# Patient Record
Sex: MALE | Race: WHITE | NOT HISPANIC OR LATINO | Employment: OTHER | URBAN - METROPOLITAN AREA
[De-identification: names, ages, dates, MRNs, and addresses within clinical notes are randomized per-mention and may not be internally consistent; named-entity substitution may affect disease eponyms.]

---

## 2017-01-06 ENCOUNTER — GENERIC CONVERSION - ENCOUNTER (OUTPATIENT)
Dept: OTHER | Facility: OTHER | Age: 72
End: 2017-01-06

## 2017-01-06 ENCOUNTER — APPOINTMENT (OUTPATIENT)
Dept: LAB | Facility: CLINIC | Age: 72
End: 2017-01-06
Payer: MEDICARE

## 2017-01-06 DIAGNOSIS — E11.9 TYPE 2 DIABETES MELLITUS WITHOUT COMPLICATIONS (HCC): ICD-10-CM

## 2017-01-06 DIAGNOSIS — E53.8 DEFICIENCY OF OTHER SPECIFIED B GROUP VITAMINS: ICD-10-CM

## 2017-01-06 DIAGNOSIS — I10 ESSENTIAL (PRIMARY) HYPERTENSION: ICD-10-CM

## 2017-01-06 LAB
ALBUMIN SERPL BCP-MCNC: 3.8 G/DL (ref 3.5–5)
ALP SERPL-CCNC: 78 U/L (ref 46–116)
ALT SERPL W P-5'-P-CCNC: 22 U/L (ref 12–78)
ANION GAP SERPL CALCULATED.3IONS-SCNC: 6 MMOL/L (ref 4–13)
AST SERPL W P-5'-P-CCNC: 13 U/L (ref 5–45)
BILIRUB SERPL-MCNC: 0.49 MG/DL (ref 0.2–1)
BUN SERPL-MCNC: 15 MG/DL (ref 5–25)
CALCIUM SERPL-MCNC: 9.2 MG/DL (ref 8.3–10.1)
CHLORIDE SERPL-SCNC: 100 MMOL/L (ref 100–108)
CHOLEST SERPL-MCNC: 152 MG/DL (ref 50–200)
CO2 SERPL-SCNC: 29 MMOL/L (ref 21–32)
CREAT SERPL-MCNC: 0.81 MG/DL (ref 0.6–1.3)
EST. AVERAGE GLUCOSE BLD GHB EST-MCNC: 266 MG/DL
GFR SERPL CREATININE-BSD FRML MDRD: >60 ML/MIN/1.73SQ M
GLUCOSE SERPL-MCNC: 305 MG/DL (ref 65–140)
HBA1C MFR BLD: 10.9 % (ref 4.2–6.3)
HDLC SERPL-MCNC: 47 MG/DL (ref 40–60)
LDLC SERPL CALC-MCNC: 88 MG/DL (ref 0–100)
POTASSIUM SERPL-SCNC: 4.3 MMOL/L (ref 3.5–5.3)
PROT SERPL-MCNC: 8.1 G/DL (ref 6.4–8.2)
SODIUM SERPL-SCNC: 135 MMOL/L (ref 136–145)
TRIGL SERPL-MCNC: 83 MG/DL
VIT B12 SERPL-MCNC: 289 PG/ML (ref 100–900)

## 2017-01-06 PROCEDURE — 80061 LIPID PANEL: CPT

## 2017-01-06 PROCEDURE — 83036 HEMOGLOBIN GLYCOSYLATED A1C: CPT

## 2017-01-06 PROCEDURE — 36415 COLL VENOUS BLD VENIPUNCTURE: CPT

## 2017-01-06 PROCEDURE — 80053 COMPREHEN METABOLIC PANEL: CPT

## 2017-01-06 PROCEDURE — 82607 VITAMIN B-12: CPT

## 2017-01-11 ENCOUNTER — ALLSCRIPTS OFFICE VISIT (OUTPATIENT)
Dept: OTHER | Facility: OTHER | Age: 72
End: 2017-01-11

## 2017-01-12 ENCOUNTER — GENERIC CONVERSION - ENCOUNTER (OUTPATIENT)
Dept: OTHER | Facility: OTHER | Age: 72
End: 2017-01-12

## 2017-04-12 DIAGNOSIS — E11.9 TYPE 2 DIABETES MELLITUS WITHOUT COMPLICATIONS (HCC): ICD-10-CM

## 2017-05-12 ENCOUNTER — APPOINTMENT (OUTPATIENT)
Dept: LAB | Facility: CLINIC | Age: 72
End: 2017-05-12
Payer: MEDICARE

## 2017-05-12 ENCOUNTER — TRANSCRIBE ORDERS (OUTPATIENT)
Dept: LAB | Facility: CLINIC | Age: 72
End: 2017-05-12

## 2017-05-12 DIAGNOSIS — E11.9 TYPE 2 DIABETES MELLITUS WITHOUT COMPLICATIONS (HCC): ICD-10-CM

## 2017-05-12 LAB
ALBUMIN SERPL BCP-MCNC: 3.7 G/DL (ref 3.5–5)
ALP SERPL-CCNC: 80 U/L (ref 46–116)
ALT SERPL W P-5'-P-CCNC: 22 U/L (ref 12–78)
ANION GAP SERPL CALCULATED.3IONS-SCNC: 7 MMOL/L (ref 4–13)
AST SERPL W P-5'-P-CCNC: 14 U/L (ref 5–45)
BILIRUB SERPL-MCNC: 0.37 MG/DL (ref 0.2–1)
BUN SERPL-MCNC: 14 MG/DL (ref 5–25)
CALCIUM SERPL-MCNC: 9.1 MG/DL (ref 8.3–10.1)
CHLORIDE SERPL-SCNC: 98 MMOL/L (ref 100–108)
CO2 SERPL-SCNC: 31 MMOL/L (ref 21–32)
CREAT SERPL-MCNC: 0.76 MG/DL (ref 0.6–1.3)
CREAT UR-MCNC: 39.2 MG/DL
EST. AVERAGE GLUCOSE BLD GHB EST-MCNC: 226 MG/DL
GFR SERPL CREATININE-BSD FRML MDRD: >60 ML/MIN/1.73SQ M
GLUCOSE P FAST SERPL-MCNC: 264 MG/DL (ref 65–99)
HBA1C MFR BLD: 9.5 % (ref 4.2–6.3)
MICROALBUMIN UR-MCNC: 32.4 MG/L (ref 0–20)
MICROALBUMIN/CREAT 24H UR: 83 MG/G CREATININE (ref 0–30)
POTASSIUM SERPL-SCNC: 3.9 MMOL/L (ref 3.5–5.3)
PROT SERPL-MCNC: 7.8 G/DL (ref 6.4–8.2)
SODIUM SERPL-SCNC: 136 MMOL/L (ref 136–145)

## 2017-05-12 PROCEDURE — 36415 COLL VENOUS BLD VENIPUNCTURE: CPT

## 2017-05-12 PROCEDURE — 80053 COMPREHEN METABOLIC PANEL: CPT

## 2017-05-12 PROCEDURE — 82043 UR ALBUMIN QUANTITATIVE: CPT

## 2017-05-12 PROCEDURE — 83036 HEMOGLOBIN GLYCOSYLATED A1C: CPT

## 2017-05-12 PROCEDURE — 82570 ASSAY OF URINE CREATININE: CPT

## 2017-05-15 ENCOUNTER — GENERIC CONVERSION - ENCOUNTER (OUTPATIENT)
Dept: OTHER | Facility: OTHER | Age: 72
End: 2017-05-15

## 2017-05-17 ENCOUNTER — ALLSCRIPTS OFFICE VISIT (OUTPATIENT)
Dept: OTHER | Facility: OTHER | Age: 72
End: 2017-05-17

## 2017-07-13 ENCOUNTER — ALLSCRIPTS OFFICE VISIT (OUTPATIENT)
Dept: OTHER | Facility: OTHER | Age: 72
End: 2017-07-13

## 2017-08-14 DIAGNOSIS — E11.9 TYPE 2 DIABETES MELLITUS WITHOUT COMPLICATIONS (HCC): ICD-10-CM

## 2017-10-12 ENCOUNTER — GENERIC CONVERSION - ENCOUNTER (OUTPATIENT)
Dept: OTHER | Facility: OTHER | Age: 72
End: 2017-10-12

## 2017-10-23 ENCOUNTER — GENERIC CONVERSION - ENCOUNTER (OUTPATIENT)
Dept: OTHER | Facility: OTHER | Age: 72
End: 2017-10-23

## 2017-11-02 ENCOUNTER — TRANSCRIBE ORDERS (OUTPATIENT)
Dept: LAB | Facility: CLINIC | Age: 72
End: 2017-11-02

## 2017-11-02 ENCOUNTER — GENERIC CONVERSION - ENCOUNTER (OUTPATIENT)
Dept: OTHER | Facility: OTHER | Age: 72
End: 2017-11-02

## 2017-11-02 ENCOUNTER — APPOINTMENT (OUTPATIENT)
Dept: LAB | Facility: CLINIC | Age: 72
End: 2017-11-02
Payer: MEDICARE

## 2017-11-02 DIAGNOSIS — E11.9 TYPE 2 DIABETES MELLITUS WITHOUT COMPLICATIONS (HCC): ICD-10-CM

## 2017-11-02 LAB
ALBUMIN SERPL BCP-MCNC: 3.6 G/DL (ref 3.5–5)
ALP SERPL-CCNC: 64 U/L (ref 46–116)
ALT SERPL W P-5'-P-CCNC: 19 U/L (ref 12–78)
ANION GAP SERPL CALCULATED.3IONS-SCNC: 8 MMOL/L (ref 4–13)
AST SERPL W P-5'-P-CCNC: 12 U/L (ref 5–45)
BILIRUB SERPL-MCNC: 0.32 MG/DL (ref 0.2–1)
BUN SERPL-MCNC: 16 MG/DL (ref 5–25)
CALCIUM SERPL-MCNC: 9.1 MG/DL (ref 8.3–10.1)
CHLORIDE SERPL-SCNC: 102 MMOL/L (ref 100–108)
CO2 SERPL-SCNC: 26 MMOL/L (ref 21–32)
CREAT SERPL-MCNC: 0.79 MG/DL (ref 0.6–1.3)
EST. AVERAGE GLUCOSE BLD GHB EST-MCNC: 255 MG/DL
GFR SERPL CREATININE-BSD FRML MDRD: 92 ML/MIN/1.73SQ M
GLUCOSE P FAST SERPL-MCNC: 282 MG/DL (ref 65–99)
HBA1C MFR BLD: 10.5 % (ref 4.2–6.3)
POTASSIUM SERPL-SCNC: 4 MMOL/L (ref 3.5–5.3)
PROT SERPL-MCNC: 7.4 G/DL (ref 6.4–8.2)
SODIUM SERPL-SCNC: 136 MMOL/L (ref 136–145)

## 2017-11-02 PROCEDURE — 80053 COMPREHEN METABOLIC PANEL: CPT

## 2017-11-02 PROCEDURE — 83036 HEMOGLOBIN GLYCOSYLATED A1C: CPT

## 2017-11-02 PROCEDURE — 36415 COLL VENOUS BLD VENIPUNCTURE: CPT

## 2017-11-06 ENCOUNTER — ALLSCRIPTS OFFICE VISIT (OUTPATIENT)
Dept: OTHER | Facility: OTHER | Age: 72
End: 2017-11-06

## 2017-11-06 DIAGNOSIS — R00.0 TACHYCARDIA: ICD-10-CM

## 2017-11-06 DIAGNOSIS — R00.2 PALPITATIONS: ICD-10-CM

## 2017-11-07 ENCOUNTER — APPOINTMENT (OUTPATIENT)
Dept: LAB | Facility: CLINIC | Age: 72
End: 2017-11-07
Payer: MEDICARE

## 2017-11-07 ENCOUNTER — GENERIC CONVERSION - ENCOUNTER (OUTPATIENT)
Dept: OTHER | Facility: OTHER | Age: 72
End: 2017-11-07

## 2017-11-07 DIAGNOSIS — R00.0 TACHYCARDIA: ICD-10-CM

## 2017-11-07 LAB
BASOPHILS # BLD AUTO: 0.03 THOUSANDS/ΜL (ref 0–0.1)
BASOPHILS NFR BLD AUTO: 0 % (ref 0–1)
EOSINOPHIL # BLD AUTO: 0.28 THOUSAND/ΜL (ref 0–0.61)
EOSINOPHIL NFR BLD AUTO: 3 % (ref 0–6)
ERYTHROCYTE [DISTWIDTH] IN BLOOD BY AUTOMATED COUNT: 14.5 % (ref 11.6–15.1)
HCT VFR BLD AUTO: 44.8 % (ref 36.5–49.3)
HGB BLD-MCNC: 14.9 G/DL (ref 12–17)
LYMPHOCYTES # BLD AUTO: 2.62 THOUSANDS/ΜL (ref 0.6–4.47)
LYMPHOCYTES NFR BLD AUTO: 26 % (ref 14–44)
MCH RBC QN AUTO: 30.1 PG (ref 26.8–34.3)
MCHC RBC AUTO-ENTMCNC: 33.3 G/DL (ref 31.4–37.4)
MCV RBC AUTO: 91 FL (ref 82–98)
MONOCYTES # BLD AUTO: 0.79 THOUSAND/ΜL (ref 0.17–1.22)
MONOCYTES NFR BLD AUTO: 8 % (ref 4–12)
NEUTROPHILS # BLD AUTO: 6.48 THOUSANDS/ΜL (ref 1.85–7.62)
NEUTS SEG NFR BLD AUTO: 63 % (ref 43–75)
NRBC BLD AUTO-RTO: 0 /100 WBCS
PLATELET # BLD AUTO: 271 THOUSANDS/UL (ref 149–390)
PMV BLD AUTO: 10.7 FL (ref 8.9–12.7)
RBC # BLD AUTO: 4.95 MILLION/UL (ref 3.88–5.62)
TSH SERPL DL<=0.05 MIU/L-ACNC: 0.49 UIU/ML (ref 0.36–3.74)
WBC # BLD AUTO: 10.26 THOUSAND/UL (ref 4.31–10.16)

## 2017-11-07 PROCEDURE — 84443 ASSAY THYROID STIM HORMONE: CPT

## 2017-11-07 PROCEDURE — 36415 COLL VENOUS BLD VENIPUNCTURE: CPT

## 2017-11-07 PROCEDURE — 85025 COMPLETE CBC W/AUTO DIFF WBC: CPT

## 2017-11-07 NOTE — PROGRESS NOTES
Assessment  1  Tachycardia (785 0) (R00 0)   2  Diabetes mellitus, type 2 (250 00) (E11 9)   3  Hypertension (401 9) (I10)   4  Depression (311) (F32 9)   5  Diabetic retinopathy (250 50,362 01) (E11 319)   ·   [address] [facility] [address] [place]  Diabetes mellitus, type 2, Diabetic retinopathy    · (1) HEMOGLOBIN A1C; Status:Active; Requested for:61Nkx4893; Flu vaccine need    · Stop: Fluzone High-Dose 0 5 ML Intramuscular Suspension Prefilled  Syringe  Hypertension    · (1) COMPREHENSIVE METABOLIC PANEL; Status:Active; Requested for:46Now7585; Tachycardia    · Metoprolol Succinate ER 50 MG Oral Tablet Extended Release 24 Hour; TAKE 1  TABLET DAILY   · (1) CBC/PLT/DIFF; Status:Active; Requested WYJ:09JGO5986;    · (1) TSH WITH FT4 REFLEX; Status:Active; Requested BEZ:57BLF8171;    · EKG/ECG- POC; Status:Complete - Retrospective By Protocol Authorization;   Done:  76DFF6170 02:05PM   · 1 - Dipti Posada MD, Angie Lemus  (Cardiology) Co-Management  *  Status: Active  Requested for:  63DJT6185  Care Summary provided  : Yes    Discussion/Summary    Patient presents today for follow-up for his chronic health issues  He was noted to be tachycardic with rates ranging from 120 up to about 150  He is asymptomatic  EKG did reveal tachycardia with no signs of atrial fibrillation or a flutter  I spoke to Cardiology and asked them to look at the EKG and they agreed  I am going to check a CBC as well as a TSH  He has no obvious reason for this tachycardia and I am going to expedite an evaluation with Cardiology in addition to adding metoprolol 50 mg daily to help with rate control currently  regards to his diabetes, he continues to refuse insulin  I spoke to him very frankly about the concerns I have regarding him developing ongoing complications with this persistently elevated A1c  He already has some diabetic retinopathy which has been stable, but can certainly progress in light of the high A1c   He is planning on being compliant with the Victoza as well as metformin changing his diet  He does agree to get blood work done within 3 months time and I will see him after that  Contact me immediately if he is having any shortness of breath, lightheadedness or chest discomfort  If he starts to have chest pain he should present to the emergency room  Chief Complaint  8M Follow up - HTN, DM, DepressionFlu shot      Advance Directives  Advance Directive [place]:   YES - Patient has an advance health care directive  The patient has a living will located  in patient's home--   Pt Instructed to bring in copy of living will at next appt  History of Present Illness  Patient presents today for follow-up for his chronic health issues  He was noted incidentally to have a heart rate in the 130s  He has noted that his heart was racing over the past 2 days  He has had no chest pain or shortness of breath  He notes he has maintained his normal activity level without any limitations or lightheadedness  He currently is not having any chest pain or palpitations  He has had no changes to his medical regimen  diabetic control is poor currently  He continues to refuse insulin  He notes he has been relatively noncompliant with Victoza but has been taking his metformin  He has noted some increased voiding but not increased thirst has been stable  Anxiety is under control  He continues on paroxetine  has improved with the Primidone from Dr Nathalie Mckay  Review of Systems    Constitutional: no fever,-- not feeling poorly,-- no recent weight gain,-- no chills,-- not feeling tired-- and-- no recent weight loss  ENT: no sore throat-- and-- no hoarseness  Cardiovascular: the heart rate was not slow-- and-- no chest pain  Respiratory: no shortness of breath,-- no cough,-- no wheezing-- and-- no shortness of breath during exertion  Gastrointestinal: no abdominal pain,-- no nausea,-- no constipation-- and-- no diarrhea  Genitourinary: no urinary hesitancy-- and-- no nocturia  Musculoskeletal: no arthralgias-- and-- no myalgias  Integumentary: itching,-- dry skin,-- skin wound-- and-- psoriasis, but-- no rashes  Neurological: no headache-- and-- no limb weakness  Psychiatric: no anxiety-- and-- no depression  Endocrine: no muscle weakness  Hematologic/Lymphatic: no tendency for easy bleeding  Active Problems  1  Benign familial tremor (333 1) (G25 0)   2  Depression (311) (F32 9)   3  Diabetes mellitus, type 2 (250 00) (E11 9)   4  Diabetic retinopathy (250 50,362 01) (E11 319)   5  Eczema (692 9) (L30 9)   6  Encounter for screening colonoscopy (V76 51) (Z12 11)   7  Exercise counseling (V65 41) (Z71 82)   8  Hypertension (401 9) (I10)   9  Male erectile dysfunction (607 84) (N52 9)   10  Medicare annual wellness visit, subsequent (V70 0) (Z00 00)   11  Need for vaccination with 13-polyvalent pneumococcal conjugate vaccine (V03 82) (Z23)   12  Psoriasis (696 1) (L40 9)   13  Sleep apnea (780 57) (G47 30)   14  Tinea cruris (110 3) (B35 6)   15  Vitamin B12 deficiency (266 2) (E53 8)   16  Vitamin D deficiency (268 9) (E55 9)    Past Medical History  1  History of Cellulitis (682 9) (L03 90)   2  History of hypercholesterolemia (V12 29) (Z86 39)   3  History of intermittent claudication (V12 50) (Z86 79)   4  History of Memory loss (780 93) (R41 3)   5  History of Mild cognitive impairment (331 83) (G31 84)   6  History of Vitamin D deficiency (268 9) (E55 9)    The active problems and past medical history were reviewed and updated today  Family History  Father    1  Family history of Father  At Age 79   2  Family history of Malignant Melanoma Of The Skin (V16 8)    The family history was reviewed and updated today  Social History   · Being A Social Drinker   · Former smoker (C32 24) (A19 106)    Current Meds   1  AmLODIPine Besylate 5 MG Oral Tablet; TAKE 1 TABLET DAILY;    Therapy: 71SCA8589 to (Evaluate:2017)  Requested for: 2016; Last Rx:19Apr2016 Ordered   2  Benazepril HCl - 20 MG Oral Tablet; TAKE 1 TABLET DAILY AS DIRECTED; Therapy: 97DXG1823 to (Evaluate:14Apr2017)  Requested for: 19Apr2016; Last   Rx:19Apr2016 Ordered   3  Calcipotriene 0 005 % External Ointment; APPLY AND GENTLY MASSAGE INTO   AFFECTED AREA(S) TWICE DAILY; Therapy: 24LXE3987 to Recorded   4  Clobetasol Propionate 0 05 % External Cream; APPLY SPARINGLY TO AFFECTED   AREA(S) TWICE DAILY; Therapy: 91ROD7401 to Recorded   5  Fish Oil 1200 MG Oral Capsule; take 1 capsule daily; Therapy: (Recorded:15Apr2016) to Recorded   6  MetFORMIN HCl - 1000 MG Oral Tablet; TAKE 1 TABLET TWICE DAILY; Therapy: 51SOA6242 to (Evaluate:12May2018)  Requested for: 69YMJ2033; Last   Rx:48Cwb2423 Ordered   7  Mometasone Furoate 0 1 % External Cream; APPLY SPARINGLY TO AFFECTED AREAS   TWICE DAILY  (AM AND PM); Therapy: 44WFG3699 to Recorded   8  Multi-Day Vitamins TABS; TAKE 1 TABLET DAILY; Therapy: (Recorded:15Apr2016) to Recorded   9  NovoFine 32G X 6 MM Miscellaneous; USE AS DIRECTED; Therapy: 38WZX2794 to (Evaluate:12May2018)  Requested for: 42YZN1837; Last   Rx:39Hoi0530 Ordered   10  PARoxetine HCl - 20 MG Oral Tablet; take 1 tablet by mouth once daily; Therapy: 94HTF8202 to (Evaluate:12Jun2016)  Requested for: 35PHG0200; Last    Rx:77Klj7509 Ordered   11  Primidone 50 MG Oral Tablet; Take 3 tabs in the am and 4 tabs in pm x 1 week, if    no improvement can increase to 4 tabs BID; Therapy: 01ADP5737 to (Evaluate:08Apr2017)  Requested for: 92JCS1971; Last    Rx:32Kac2244 Ordered   12  Victoza 18 MG/3ML Subcutaneous Solution Pen-injector; inject 1 8 milligram    subcutaneously daily; Therapy: 79QBC8667 to (DIQMGWRP:30GEY6937)  Requested for: 63MLY8493; Last    HW:29NUA2978 Ordered   13  Vitamin B12 TABS; TAKE 1 TABLET DAILY AS DIRECTED; Therapy: (Recorded:67Wuo3118) to Recorded   14  Vitamin D (Ergocalciferol) 85981 UNIT Oral Capsule;     Therapy: 30YIG6375 to (Last Rx:76Khd3525)  Requested for: 40MTU6416 Ordered    Allergies  1  Actos TABS   2  Sulfa Drugs    Vitals  Vital Signs    Recorded: 11EOL2159 01:56PM   Heart Rate 136   Respiration 16   Systolic 716   Diastolic 76   Height 5 ft 8 in   Weight 227 lb    BMI Calculated 34 52   BSA Calculated 2 16   O2 Saturation 96, RA     Physical Exam    Constitutional   General appearance: Abnormal  -- obese  Eyes   Conjunctiva and lids: No swelling, erythema, or discharge  Ears, Nose, Mouth, and Throat   Nasal mucosa, septum, and turbinates: Normal without edema or erythema  Oropharynx: Normal with no erythema, edema, exudate or lesions  Pulmonary   Respiratory effort: No increased work of breathing or signs of respiratory distress  Auscultation of lungs: Clear to auscultation, equal breath sounds bilaterally, no wheezes, no rales, no rhonci  Cardiovascular   Auscultation of heart: Normal rate and rhythm, normal S1 and S2, without murmurs  Examination of extremities for edema and/or varicosities: Normal     Carotid pulses: Normal     Abdomen   Abdomen: Abnormal  -- obese  Lymphatic   Palpation of lymph nodes in neck: No lymphadenopathy  Musculoskeletal   Gait and station: Normal     Skin   Examination of the skin for lesions: Abnormal     Neurologic   Cranial nerves: Cranial nerves 2-12 intact  Psychiatric   Orientation to person, place and time: Normal     Mood and affect: Normal          Results/Data  EKG/ECG- POC 42DWR7295 02:05PM Aleksey Hawley     Test Name Result Flag Reference   EKG/ECG 11/6/2017       PHQ-9 Adult Depression Screening 24EZX6913 02:02PM Ita Oliver     Test Name Result Flag Reference   PHQ-9 Adult Depression Score 0     Over the last two weeks, how often have you been bothered by any of the following problems?   Little interest or pleasure in doing things: Not at all - 0  Feeling down, depressed, or hopeless: Not at all - 0  Trouble falling or staying asleep, or sleeping too much: Not at all - 0  Feeling tired or having little energy: Not at all - 0  Poor appetite or over eating: Not at all - 0  Feeling bad about yourself - or that you are a failure or have let yourself or your family down: Not at all - 0  Trouble concentrating on things, such as reading the newspaper or watching television: Not at all - 0  Moving or speaking so slowly that other people could have noticed  Or the opposite -  being so fidgety or restless that you have been moving around a lot more than usual: Not at all - 0  Thoughts that you would be better off dead, or of hurting yourself in some way: Not at all - 0   PHQ-9 Adult Depression Screening Negative     PHQ-9 Difficulty Level Not difficult at all     PHQ-9 Severity No Depression         Health Management  Diabetes mellitus, type 2   *VB - Eye Exam; every 1 year; Last 93IFF6953; Next Due: 03HJK2567; Active  *VB - Foot Exam; every 1 year; Last 43OKS5643; Next Due: 14WBH0893;  Active    Future Appointments    Date/Time Provider Specialty Site   07/19/2018 12:30 PM Nehemiah Wade MD Neurology [facility]     Signatures   Electronically signed by : TAISHA Lama ; Nov 6 2017  2:39PM EST                       (Author)

## 2017-11-08 ENCOUNTER — ALLSCRIPTS OFFICE VISIT (OUTPATIENT)
Dept: OTHER | Facility: OTHER | Age: 72
End: 2017-11-08

## 2017-11-10 NOTE — CONSULTS
Assessment  1  Tachycardia (785 0) (R00 0)   2  Palpitations (785 1) (R00 2)   3  Hypertension (401 9) (I10)    Plan  Hypertension    · EKG/ECG- POC; Status:Complete;   Done: 28WZU8291 02:55PM   Perform: In Office; Last Updated Jacobo Quevedo; 11/8/2017 2:55:37 PM;Ordered;Ordered By:Sarkis Page;  Palpitations, Tachycardia    · ECHO COMPLETE WITH CONTRAST IF INDICATED; Status:Hold For - Scheduling;Requested for:08Nov2017;    Perform:[facility]; LFB:97JCJ6770; Ordered; Tachycardia; Ordered By:Sarkis Page;   · HOLTER MONITOR - 48 HOUR; Status:Hold For - Scheduling; Requestedfor:08Nov2017;    Perform:[facility]; EVK:64GBZ0339; Ordered; Tachycardia; Ordered By:Sarkis Page;    Discussion/Summary    It is my impression that the patient had a narrow complex tachycardia when he saw his primary care physician 2 days ago  His heart rate was about 130 beats per minute  He did have active palpitations at that time  Somewhere in the next 24 hours he appeared to have disappearance of the symptoms  Today he appears to be clearly in sinus rhythm  I reviewed his strips with my electrophysiology associate and it is possible this may represent paroxysmal atrial tachycardia  We will continue modest dose beta-blockers going forward  I will bring him back for a 48 hour Holter monitor in a few weeks time  We will do an echocardiogram to rule out structural heart disease  It is unlikely there is any structural heart disease based on his exam  His TSH and CBC were normal and there appears to be no provocation for sinus tachycardia  He will continue his usual antihypertensive regimen which includes amlodipine, benazepril and now metoprolol  His blood pressure was quite good today on this regimen  I will follow up with him by phone regarding the results of his echocardiogram and Holter monitor  Chief Complaint  Patient here for NP evaluation for palpitations and tachycardia  Sonal Vegas  also has HTN/DM      History of Present Illness  Cardiology HPI Free Text Note Form Marton Halsted: The patient presented to his family physician for a routine visit on 11/06/2017  He had noticed that his heart had been racing for 1 day  He was noticed to be in a narrow complex tachycardia on his EKG at 130 beats per minute  He felt that the palpitations have gone away within 24 hours  He checked his blood pressure and heart rate at a station at his pharmacy and his heart rate was 89 beats per minute the next day  He was put on metoprolol and started taking this today  He denies chest pain, shortness of breath, edema or lightheadedness  Review of Systems     Cardiac: witnessed apnea episodes, but-- No complaints of chest pain, no palpitations, no fainiting ,-- no syncope/fainting-- and-- no AM fatigue  Skin: No complaints of nonhealing sores or skin rash  Genitourinary: No complaints of recurrent urinary tract infections, frequent urination at night, difficult urination, blood in urine, kidney stones, loss of bladder control, no kidney or prostate problems, no erectile dysfunction  Psychological: No complaints of feeling depressed, anxiety, panic attacks, or difficulty concentrating  General: No complaints of trouble sleeping, lack of energy, fatigue, appetite changes, weight changes, fever, frequent infections, or night sweats  Respiratory: No complaints of shortness of breath, cough with sputum, or wheezing  HEENT: No complaints of serious problems, hearing problems, nose problems, throat problems, or snoring  Gastrointestinal: No complaints of liver problems, nausea, vomiting, heartburn, constipation, bloody stools, diarrhea, problems swallowing, adbominal pain, or rectal bleeding  Hematologic: No complaints of bleeding disorders, anemia, blood clots, or excessive brusing    Neurological: no numbness,-- no tingling,-- no weakness,-- no seizures,-- no headaches,-- no dizziness,-- no diplopia-- and-- no daytime sleepiness  Musculoskeletal: No complaints of arthritis, back pain, or painfull swelling  ROS reviewed  Active Problems  1  Benign familial tremor (333 1) (G25 0)   2  Depression (311) (F32 9)   3  Diabetes mellitus, type 2 (250 00) (E11 9)   4  Diabetic retinopathy (250 50,362 01) (E11 319)   5  Eczema (692 9) (L30 9)   6  Encounter for screening colonoscopy (V76 51) (Z12 11)   7  Exercise counseling (V65 41) (Z71 82)   8  Flu vaccine need (V04 81) (Z23)   9  Hypertension (401 9) (I10)   10  Male erectile dysfunction (607 84) (N52 9)   11  Medicare annual wellness visit, subsequent (V70 0) (Z00 00)   12  Need for vaccination with 13-polyvalent pneumococcal conjugate vaccine (V03 82) (Z23)   13  Psoriasis (696 1) (L40 9)   14  Sleep apnea (780 57) (G47 30)   15  Tachycardia (785 0) (R00 0)   16  Tinea cruris (110 3) (B35 6)   17  Vitamin B12 deficiency (266 2) (E53 8)   18  Vitamin D deficiency (268 9) (E55 9)    Past Medical History   · History of Cellulitis (682 9) (L03 90)   · History of hypercholesterolemia (V12 29) (Z86 39)   · History of intermittent claudication (V12 50) (Z86 79)   · History of Memory loss (780 93) (R41 3)   · History of Mild cognitive impairment (331 83) (G31 84)   · History of Vitamin D deficiency (268 9) (E55 9)    The active problems and past medical history were reviewed and updated today  Surgical History   · History of Tonsillectomy With Adenoidectomy    The surgical history was reviewed and updated today  Family History  Father    · Family history of Father  At Age 79   · Family history of Malignant Melanoma Of The Skin (V16 8)  Family History Reviewed: The family history was reviewed and updated today  Social History     · Being A Social Drinker   · RARE   · Former smoker (Q77 97) (B71 102)   · QUIT   The social history was reviewed and updated today  The social history was reviewed and is unchanged  Current Meds   1   AmLODIPine Besylate 5 MG Oral Tablet; TAKE 1 TABLET DAILY; Therapy: 89FYQ2483 to (Evaluate:14Apr2017)  Requested for: 19Apr2016; Last Rx:19Apr2016 Ordered   2  Benazepril HCl - 20 MG Oral Tablet; TAKE 1 TABLET DAILY AS DIRECTED; Therapy: 12LER3373 to (Evaluate:14Apr2017)  Requested for: 19Apr2016; Last Rx:19Apr2016 Ordered   3  Calcipotriene 0 005 % External Ointment; APPLY AND GENTLY MASSAGE INTO AFFECTED AREA(S) TWICE DAILY; Therapy: 42NCP6302 to Recorded   4  Clobetasol Propionate 0 05 % External Cream; APPLY SPARINGLY TO AFFECTED AREA(S) TWICE DAILY; Therapy: 50IJA9154 to Recorded   5  Fish Oil 1200 MG Oral Capsule; take 1 capsule daily; Therapy: (Recorded:15Apr2016) to Recorded   6  MetFORMIN HCl - 1000 MG Oral Tablet; TAKE 1 TABLET TWICE DAILY; Therapy: 62PCO9069 to (Evaluate:12May2018)  Requested for: 16OEN3400; Last Rx:17May2017 Ordered   7  Metoprolol Succinate ER 50 MG Oral Tablet Extended Release 24 Hour; TAKE 1 TABLET DAILY; Therapy: 69URC4229 to (Lee Howell)  Requested for: 31GNH5045; Last Rx:06Nov2017 Ordered   8  Mometasone Furoate 0 1 % External Cream; APPLY SPARINGLY TO AFFECTED AREAS TWICE DAILY  (AM AND PM); Therapy: 58TXR6731 to Recorded   9  Multi-Day Vitamins TABS; TAKE 1 TABLET DAILY; Therapy: (Recorded:15Apr2016) to Recorded   10  NovoFine 32G X 6 MM Miscellaneous; USE AS DIRECTED; Therapy: 13XTZ7300 to (Evaluate:12May2018)  Requested for: 94UQA8964; Last  Rx:66Ijv9014 Ordered   11  PARoxetine HCl - 20 MG Oral Tablet; take 1 tablet by mouth once daily; Therapy: 28CIL0590 to (Evaluate:12Jun2016)  Requested for: 79WFZ0990; Last  Rx:52Peu9711 Ordered   12  Primidone 50 MG Oral Tablet; Take 3 tabs in the am and 4 tabs in pm x 1 week, if  no improvement can increase to 4 tabs BID; Therapy: 68BOL5019 to (Evaluate:08Apr2017)  Requested for: 06JSG6298; Last  Rx:16Mxi1752 Ordered   13  Victoza 18 MG/3ML Subcutaneous Solution Pen-injector; inject 1 8 milligram  subcutaneously daily;   Therapy: 06ZWU7877 to (Evaluate:21Jun2018)  Requested for: 68ZWT2359; Last  WP:42HUS0139 Ordered   14  Vitamin B12 TABS; TAKE 1 TABLET DAILY AS DIRECTED; Therapy: (Recorded:93Vzf8841) to Recorded   15  Vitamin D (Ergocalciferol) 14444 UNIT Oral Capsule; Therapy: 45RQJ7825 to (Last Rx:30Nov2011)  Requested for: 62USJ6587 Ordered    The medication list was reviewed and updated today  Allergies  1  Actos TABS   2  Sulfa Drugs    Vitals  Signs     Heart Rate: 86  Pulse Quality: Normal, L Radial  Respiration Quality: Normal  Respiration: 16  Systolic: 634  Diastolic: 68  Height: 5 ft 8 in  Weight: 229 lb   BMI Calculated: 34 82  BSA Calculated: 2 16    Physical Exam   Constitutional  General appearance: Abnormal  -- obese elderly WM in NAD  Eyes  Conjunctiva and Sclera examination: Conjunctiva pink, sclera anicteric  Ears, Nose, Mouth, and Throat - Oropharynx: Clear, nares are clear, mucous membranes are moist   Neck  Neck and thyroid: Normal, supple, trachea midline, no thyromegaly  Pulmonary  Respiratory effort: No increased work of breathing or signs of respiratory distress  Auscultation of lungs: Clear to auscultation, no rales, no rhonchi, no wheezing, good air movement  Cardiovascular  Auscultation of heart: Normal rate and rhythm, normal S1 and S2, no murmurs  Carotid pulses: Normal, 2+ bilaterally  Pedal pulses: Normal, 2+ bilaterally  Examination of extremities for edema and/or varicosities: Abnormal  -- 1+ LE edema bilaterally  Abdomen  Abdomen: Non-tender and no distention  Liver and spleen: No hepatomegaly or splenomegaly  Future Appointments    Date/Time Provider Specialty Site   07/19/2018 12:30 PM Charito Hager MD Neurology [facility]  [place]   02/23/2018 10:30 TAISHA Dougherty  [address] [facility]   05/16/2018 01:00 PM TAISHA Frost  Family Medicine [place] [address]     End of Encounter Meds    1  Primidone 50 MG Oral Tablet;  Take 3 tabs in the am and 4 tabs in pm x 1 week, if no improvement can increase to 4 tabs BID; Therapy: 71EKY8602 to (Evaluate:08Apr2017)  Requested for: 64DQW1631; Last Rx:09Nlx9130 Ordered    2  PARoxetine HCl - 20 MG Oral Tablet; take 1 tablet by mouth once daily; Therapy: 03SSO1910 to (Evaluate:12Jun2016)  Requested for: 75FBT4127; Last Rx:36Syo1626 Ordered    3  MetFORMIN HCl - 1000 MG Oral Tablet; TAKE 1 TABLET TWICE DAILY; Therapy: 60EBU0189 to (Evaluate:12May2018)  Requested for: 53VNO1114; Last Rx:17May2017 Ordered   4  NovoFine 32G X 6 MM Miscellaneous; USE AS DIRECTED; Therapy: 37HQP4675 to (Evaluate:12May2018)  Requested for: 59JLV0341; Last Rx:17May2017 Ordered   5  Victoza 18 MG/3ML Subcutaneous Solution Pen-injector; inject 1 8 milligram subcutaneously daily; Therapy: 54XXZ4512 to (MIDZDIEJ:88XVG4490)  Requested for: 47OIY3934; Last PD:10KGP6817 Ordered    6  AmLODIPine Besylate 5 MG Oral Tablet; TAKE 1 TABLET DAILY; Therapy: 34WNO9764 to (Evaluate:14Apr2017)  Requested for: 19Apr2016; Last Rx:19Apr2016 Ordered   7  Benazepril HCl - 20 MG Oral Tablet; TAKE 1 TABLET DAILY AS DIRECTED; Therapy: 91GAY3995 to (Evaluate:14Apr2017)  Requested for: 19Apr2016; Last Rx:19Apr2016 Ordered    8  Calcipotriene 0 005 % External Ointment; APPLY AND GENTLY MASSAGE INTO AFFECTED AREA(S) TWICE DAILY; Therapy: 73NQA1888 to Recorded   9  Clobetasol Propionate 0 05 % External Cream; APPLY SPARINGLY TO AFFECTED AREA(S) TWICE DAILY; Therapy: 90BPD7717 to Recorded   10  Mometasone Furoate 0 1 % External Cream; APPLY SPARINGLY TO AFFECTED AREAS  TWICE DAILY  (AM AND PM); Therapy: 50URB2330 to Recorded    11  Metoprolol Succinate ER 50 MG Oral Tablet Extended Release 24 Hour; TAKE 1 TABLET  DAILY; Therapy: 63LJE8803 to (Natalia Hicks)  Requested for: 69FVZ7630; Last  Rx:06Nov2017 Ordered    12  Fish Oil 1200 MG Oral Capsule; take 1 capsule daily; Therapy: (Recorded:15Apr2016) to Recorded   13  Multi-Day Vitamins TABS; TAKE 1 TABLET DAILY;   Therapy: (Recorded:15Apr2016) to Recorded   14  Vitamin B12 TABS; TAKE 1 TABLET DAILY AS DIRECTED; Therapy: (Recorded:15Apr2016) to Recorded   15  Vitamin D (Ergocalciferol) 41352 UNIT Oral Capsule;   Therapy: 38QRH9005 to (Last Rx:30Nov2011)  Requested for: 93MHV3616 Ordered    Signatures   Electronically signed by : TAISHA Red ; Nov 8 2017  3:54PM EST                       (Author)

## 2017-11-30 ENCOUNTER — GENERIC CONVERSION - ENCOUNTER (OUTPATIENT)
Dept: OTHER | Facility: OTHER | Age: 72
End: 2017-11-30

## 2017-11-30 ENCOUNTER — HOSPITAL ENCOUNTER (OUTPATIENT)
Dept: NON INVASIVE DIAGNOSTICS | Facility: CLINIC | Age: 72
Discharge: HOME | End: 2017-11-30
Payer: MEDICARE

## 2017-11-30 DIAGNOSIS — R00.0 TACHYCARDIA: ICD-10-CM

## 2017-11-30 DIAGNOSIS — R00.2 PALPITATIONS: ICD-10-CM

## 2017-11-30 PROCEDURE — 93226 XTRNL ECG REC<48 HR SCAN A/R: CPT

## 2017-11-30 PROCEDURE — 93225 XTRNL ECG REC<48 HRS REC: CPT

## 2017-11-30 PROCEDURE — 93306 TTE W/DOPPLER COMPLETE: CPT

## 2017-12-01 ENCOUNTER — GENERIC CONVERSION - ENCOUNTER (OUTPATIENT)
Dept: OTHER | Facility: OTHER | Age: 72
End: 2017-12-01

## 2017-12-07 DIAGNOSIS — I10 ESSENTIAL (PRIMARY) HYPERTENSION: ICD-10-CM

## 2017-12-07 DIAGNOSIS — F32.9 MAJOR DEPRESSIVE DISORDER, SINGLE EPISODE: ICD-10-CM

## 2017-12-07 DIAGNOSIS — R00.0 TACHYCARDIA: ICD-10-CM

## 2017-12-07 DIAGNOSIS — E11.9 TYPE 2 DIABETES MELLITUS WITHOUT COMPLICATIONS (HCC): ICD-10-CM

## 2017-12-15 ENCOUNTER — GENERIC CONVERSION - ENCOUNTER (OUTPATIENT)
Dept: OTHER | Facility: OTHER | Age: 72
End: 2017-12-15

## 2018-01-09 NOTE — RESULT NOTES
Verified Results  (1) TSH WITH FT4 REFLEX [id number] 08:57AM Alex Chase Order Number: [id number]     Test Name Result Flag Reference   TSH 0 490 uIU/mL  0 358-3 740   Patients undergoing fluorescein dye angiography may retain small amounts of fluorescein in the body for 48-72 hours post procedure  Samples containing fluorescein can produce falsely depressed TSH values  If the patient had this procedure,a specimen should be resubmitted post fluorescein clearance  (1) CBC/PLT/DIFF [id number] 08:57AM Alex Chase Order Number: [id number]     Test Name Result Flag Reference   WBC COUNT 10 26 Thousand/uL H 4 31-10 16   RBC COUNT 4 95 Million/uL  3 88-5 62   HEMOGLOBIN 14 9 g/dL  12 0-17 0   HEMATOCRIT 44 8 %  36 5-49 3   MCV 91 fL  82-98   MCH 30 1 pg  26 8-34 3   MCHC 33 3 g/dL  31 4-37 4   RDW 14 5 %  11 6-15 1   MPV 10 7 fL  8 9-12 7   PLATELET COUNT 239 Thousands/uL  149-390   nRBC AUTOMATED 0 /100 WBCs     NEUTROPHILS RELATIVE PERCENT 63 %  43-75   LYMPHOCYTES RELATIVE PERCENT 26 %  14-44   MONOCYTES RELATIVE PERCENT 8 %  4-12   EOSINOPHILS RELATIVE PERCENT 3 %  0-6   BASOPHILS RELATIVE PERCENT 0 %  0-1   NEUTROPHILS ABSOLUTE COUNT 6 48 Thousands/? ??L  1 85-7 62   LYMPHOCYTES ABSOLUTE COUNT 2 62 Thousands/? ??L  0 60-4 47   MONOCYTES ABSOLUTE COUNT 0 79 Thousand/? ??L  0 17-1 22   EOSINOPHILS ABSOLUTE COUNT 0 28 Thousand/? ??L  0 00-0 61   BASOPHILS ABSOLUTE COUNT 0 03 Thousands/? ??L  0 00-0 10

## 2018-01-10 NOTE — MISCELLANEOUS
Provider Comments  Provider Comments:   Patient no-showed for appointment  Watson DONALDSON mailed first no-show reminder letter        Signatures   Electronically signed by : Swathi Welsh MD; Jun 9 2016  7:41AM EST                       (Author)

## 2018-01-11 NOTE — RESULT NOTES
Verified Results  (1) HEMOGLOBIN A1C 11Oct2016 07:16AM [facility] Claudeen Jude Order Number: [id number]     Test Name Result Flag Reference   HEMOGLOBIN A1C 10 5 % H 4 2-6 3   EST  AVG  GLUCOSE 255 mg/dl       (1) VITAMIN B12 11Oct2016 07:16AM [facility] Claudeen Jude Order Number: [id number]     Test Name Result Flag Reference   VITAMIN B12 287 pg/mL  100-900     (1) COMPREHENSIVE METABOLIC PANEL [id number] 38:99[facility] Order Number: [id number]     Test Name Result Flag Reference   GLUCOSE,RANDM 306 mg/dL H    If the patient is fasting, the ADA then defines impaired fasting glucose as > 100 mg/dL and diabetes as > or equal to 123 mg/dL  SODIUM 136 mmol/L  136-145   POTASSIUM 4 0 mmol/L  3 5-5 3   CHLORIDE 100 mmol/L  100-108   CARBON DIOXIDE 30 mmol/L  21-32   ANION GAP (CALC) 6 mmol/L  4-13   BLOOD UREA NITROGEN 14 mg/dL  5-25   CREATININE 0 78 mg/dL  0 60-1 30   Standardized to IDMS reference method   CALCIUM 8 7 mg/dL  8 3-10 1   BILI, TOTAL 0 23 mg/dL  0 20-1 00   ALK PHOSPHATAS 79 U/L     ALT (SGPT) 17 U/L  12-78   AST(SGOT) 8 U/L  5-45   ALBUMIN 3 6 g/dL  3 5-5 0   TOTAL PROTEIN 7 7 g/dL  6 4-8 2   eGFR Non-African American      >60 0 ml/min/1 73sq [facility] Disease Education Program recommendations are as follows:  GFR calculation is accurate only with a steady state creatinine  Chronic Kidney disease less than 60 ml/min/1 73 sq  meters  Kidney failure less than 15 ml/min/1 73 sq  meters       (1) VITAMIN D 25-HYDROXY 11Oct2016 07:16AM [facility] Order Number: [id number]     Test Name Result Flag Reference   VIT D 25-HYDROX 25 9 ng/mL L 30 0-100 0   This assay is a certified procedure of the CDC Vitamin D Standardization Certification Program (VDSCP)     Deficiency <20ng/ml   Insufficiency 20-30ng/ml   Sufficient  ng/ml     *Patients undergoing fluorescein dye angiography may retain small amounts of fluorescein in the body for 48-72 hours post procedure  Samples containing fluorescein can produce falsely elevated Vitamin D values  If the patient had this procedure, a specimen should be resubmitted post fluorescein clearance  (1) PSA (SCREEN) (Dx V76 44 Screen for Prostate Cancer) [id number] 07:16AM Alex Peñaloza Order Number: [id number]     Test Name Result Flag Reference   PROSTATE SPECIFIC ANTIGEN 0 4 ng/mL  0 0-4 0   American Urological Association Guidelines define biochemical recurrence of prostate cancer as a detectable or rising PSA value post-radical prostatectomy that is greater than or equal to 0 2 ng/mL with a second confirmatory level of greater than or equal to 0 2 ng/mL

## 2018-01-13 VITALS
SYSTOLIC BLOOD PRESSURE: 130 MMHG | BODY MASS INDEX: 34.4 KG/M2 | HEIGHT: 68 IN | HEART RATE: 136 BPM | OXYGEN SATURATION: 96 % | DIASTOLIC BLOOD PRESSURE: 76 MMHG | WEIGHT: 227 LBS | RESPIRATION RATE: 16 BRPM

## 2018-01-13 VITALS
DIASTOLIC BLOOD PRESSURE: 68 MMHG | HEART RATE: 86 BPM | HEIGHT: 68 IN | BODY MASS INDEX: 34.71 KG/M2 | SYSTOLIC BLOOD PRESSURE: 124 MMHG | RESPIRATION RATE: 16 BRPM | WEIGHT: 229 LBS

## 2018-01-13 VITALS
WEIGHT: 223 LBS | HEART RATE: 90 BPM | RESPIRATION RATE: 16 BRPM | SYSTOLIC BLOOD PRESSURE: 130 MMHG | HEIGHT: 68 IN | DIASTOLIC BLOOD PRESSURE: 60 MMHG | BODY MASS INDEX: 33.8 KG/M2

## 2018-01-13 VITALS
RESPIRATION RATE: 12 BRPM | DIASTOLIC BLOOD PRESSURE: 62 MMHG | HEIGHT: 68 IN | OXYGEN SATURATION: 98 % | SYSTOLIC BLOOD PRESSURE: 120 MMHG | WEIGHT: 225 LBS | HEART RATE: 86 BPM | BODY MASS INDEX: 34.1 KG/M2

## 2018-01-14 VITALS
SYSTOLIC BLOOD PRESSURE: 130 MMHG | BODY MASS INDEX: 34.25 KG/M2 | HEIGHT: 68 IN | DIASTOLIC BLOOD PRESSURE: 72 MMHG | WEIGHT: 226 LBS | RESPIRATION RATE: 16 BRPM | HEART RATE: 80 BPM

## 2018-01-15 NOTE — RESULT NOTES
Verified Results  (1) HEMOGLOBIN A1C [id number] 10:20AM Alex Mojica Order Number: [id number]     Test Name Result Flag Reference   HEMOGLOBIN A1C 10 9 % H 4 2-6 3   EST  AVG  GLUCOSE 266 mg/dl       (1) COMPREHENSIVE METABOLIC PANEL [id number] 29:99IF [place] Order Number: [id number]     Test Name Result Flag Reference   GLUCOSE,RANDM 305 mg/dL H    If the patient is fasting, the ADA then defines impaired fasting glucose as > 100 mg/dL and diabetes as > or equal to 123 mg/dL  SODIUM 135 mmol/L L 136-145   POTASSIUM 4 3 mmol/L  3 5-5 3   CHLORIDE 100 mmol/L  100-108   CARBON DIOXIDE 29 mmol/L  21-32   ANION GAP (CALC) 6 mmol/L  4-13   BLOOD UREA NITROGEN 15 mg/dL  5-25   CREATININE 0 81 mg/dL  0 60-1 30   Standardized to IDMS reference method   CALCIUM 9 2 mg/dL  8 3-10 1   BILI, TOTAL 0 49 mg/dL  0 20-1 00   ALK PHOSPHATAS 78 U/L     ALT (SGPT) 22 U/L  12-78   AST(SGOT) 13 U/L  5-45   ALBUMIN 3 8 g/dL  3 5-5 0   TOTAL PROTEIN 8 1 g/dL  6 4-8 2   eGFR Non-African American      >60 0 ml/min/1 73sq m   - Patient Instructions: This is a fasting blood test  Water,black tea or black  coffee only after 9:00pm the night before test Drink 2 glasses of water the morning of test   National Kidney Disease Education Program recommendations are as follows:  GFR calculation is accurate only with a steady state creatinine  Chronic Kidney disease less than 60 ml/min/1 73 sq  meters  Kidney failure less than 15 ml/min/1 73 sq  meters  (1) VITAMIN B12 [id number] 10:20AM McGrachell Mojica Order Number: [id number]     Test Name Result Flag Reference   VITAMIN B12 289 pg/mL  100-900   - Patient Instructions:  This is a fasting blood test  Water,black tea or black  coffee only after 9:00pm the night before test Drink 2 glasses of water the morning of test      (1) LIPID PANEL, FASTING [id number] 10:20AM [facility] Order Number: [id number] Test Name Result Flag Reference   CHOLESTEROL 152 mg/dL     HDL,DIRECT 47 mg/dL  40-60   Specimen collection should occur prior to Metamizole administration due to the potential for falsely depressed results  LDL CHOLESTEROL CALCULATED 88 mg/dL  0-100   - Patient Instructions: This is a fasting blood test  Water,black tea or black  coffee only after 9:00pm the night before test   Drink 2 glasses of water the morning of test     - Patient Instructions: This is a fasting blood test  Water,black tea or black  coffee only after 9:00pm the night before test Drink 2 glasses of water the morning of test   Triglyceride:         Normal              <150 mg/dl       Borderline High    150-199 mg/dl       High               200-499 mg/dl       Very High          >499 mg/dl  Cholesterol:         Desirable        <200 mg/dl      Borderline High  200-239 mg/dl      High             >239 mg/dl  HDL Cholesterol:        High    >59 mg/dL      Low     <41 mg/dL  LDL CALCULATED:    This screening LDL is a calculated result  It does not have the accuracy of the Direct Measured LDL in the monitoring of patients with hyperlipidemia and/or statin therapy  Direct Measure LDL (IKW041) must be ordered separately in these patients  TRIGLYCERIDES 83 mg/dL  <=150   Specimen collection should occur prior to N-Acetylcysteine or Metamizole administration due to the potential for falsely depressed results

## 2018-01-16 NOTE — RESULT NOTES
Verified Results  (1) HEMOGLOBIN A1C [id number] 08:28AM Libertad Dear Order Number: [id number]     Test Name Result Flag Reference   HEMOGLOBIN A1C 10 5 % H 4 2-6 3   EST  AVG  GLUCOSE 255 mg/dl       (1) COMPREHENSIVE METABOLIC PANEL [id number] 13:05AA Libertad Dear Order Number: [id number]     Test Name Result Flag Reference   SODIUM 136 mmol/L  136-145   POTASSIUM 4 0 mmol/L  3 5-5 3   CHLORIDE 102 mmol/L  100-108   CARBON DIOXIDE 26 mmol/L  21-32   ANION GAP (CALC) 8 mmol/L  4-13   BLOOD UREA NITROGEN 16 mg/dL  5-25   CREATININE 0 79 mg/dL  0 60-1 30   Standardized to IDMS reference method   CALCIUM 9 1 mg/dL  8 3-10 1   BILI, TOTAL 0 32 mg/dL  0 20-1 00   ALK PHOSPHATAS 64 U/L     ALT (SGPT) 19 U/L  12-78   Specimen collection should occur prior to Sulfasalazine and/or Sulfapyridine administration due to the potential for falsely depressed results  AST(SGOT) 12 U/L  5-45   Specimen collection should occur prior to Sulfasalazine administration due to the potential for falsely depressed results  ALBUMIN 3 6 g/dL  3 5-5 0   TOTAL PROTEIN 7 4 g/dL  6 4-8 2   eGFR 92 ml/min/1 73sq m     National Kidney Disease Education Program recommendations are as follows:  GFR calculation is accurate only with a steady state creatinine  Chronic Kidney disease less than 60 ml/min/1 73 sq  meters  Kidney failure less than 15 ml/min/1 73 sq  meters  GLUCOSE FASTING 282 mg/dL H 65-99   Specimen collection should occur prior to Sulfasalazine administration due to the potential for falsely depressed results  Specimen collection should occur prior to Sulfapyridine administration due to the potential for falsely elevated results

## 2018-01-16 NOTE — RESULT NOTES
Verified Results  (1) HEMOGLOBIN A1C [id number] 07:11AM Micah Ortizast   TW Order Number: [id number]     Test Name Result Flag Reference   HEMOGLOBIN A1C 9 5 % H 4 2-6 3   EST  AVG  GLUCOSE 226 mg/dl       (1) COMPREHENSIVE METABOLIC PANEL [id number] 86:92DP Micah Overcast   TW Order Number: [id number]     Test Name Result Flag Reference   SODIUM 136 mmol/L  136-145   POTASSIUM 3 9 mmol/L  3 5-5 3   CHLORIDE 98 mmol/L L 100-108   CARBON DIOXIDE 31 mmol/L  21-32   ANION GAP (CALC) 7 mmol/L  4-13   BLOOD UREA NITROGEN 14 mg/dL  5-25   CREATININE 0 76 mg/dL  0 60-1 30   Standardized to IDMS reference method   CALCIUM 9 1 mg/dL  8 3-10 1   BILI, TOTAL 0 37 mg/dL  0 20-1 00   ALK PHOSPHATAS 80 U/L     ALT (SGPT) 22 U/L  12-78   AST(SGOT) 14 U/L  5-45   ALBUMIN 3 7 g/dL  3 5-5 0   TOTAL PROTEIN 7 8 g/dL  6 4-8 2   eGFR Non-African American      >60 0 ml/min/1 73sq m   [facility] Disease Education Program recommendations are as follows:  GFR calculation is accurate only with a steady state creatinine  Chronic Kidney disease less than 60 ml/min/1 73 sq  meters  Kidney failure less than 15 ml/min/1 73 sq  meters     GLUCOSE FASTING 264 mg/dL H 65-99     (1) MICROALBUMIN CREATININE RATIO, RANDOM URINE [id number] 07:11AM Humberto Marinelli Order Number: [id number]     Test Name Result Flag Reference   MICROALBUMIN/ CREAT R 83 mg/g creatinine H 0-30   MICROALBUMIN,URINE 32 4 mg/L H 0 0-20 0   CREATININE URINE 39 2 mg/dL

## 2018-01-23 NOTE — RESULT NOTES
Verified Results  ECHO COMPLETE WITH CONTRAST IF INDICATED [id number] 07:43AM Willam Kumar Order Number: [id number]    - Patient Instructions: To schedule this appointment, please contact Central Scheduling at [phone]  Test Name Result Flag Reference   ECHO COMPLETE WITH CONTRAST IF INDICATED (Report)     Livan Sharma 37  [place], [address]   [phone]     Transthoracic Echocardiogram   2D, M-mode, Doppler, and Color Doppler     Study date: 2017     Patient: Cielo Hunter   MR number: [id number]   Account number: [de-identified]   : 1945   Age: 81 years   Gender: Male   Status: Outpatient   Location: [address] [place] [address] [facility] [place]   Height: 68 in   Weight: 229 lb   BP: 124/ 68 mmHg     Indications: cardiac arrhythmia  Palpitations     Diagnoses: I49 9 - Cardiac arrhythmia, unspecified     Sonographer: ADELA Lester   Primary Physician: Shiraz Chery MD   Referring Physician: Qamar Cowan MD   Group: [facility]   Interpreting Physician: Cindy Temple MD     SUMMARY     LEFT VENTRICLE:   Systolic function was normal  Ejection fraction was estimated to be 60 %  Although no diagnostic regional wall motion abnormality was identified, this possibility cannot be completely excluded on the basis of this study  There was mild concentric hypertrophy  Doppler parameters were consistent with abnormal left ventricular relaxation (grade 1 diastolic dysfunction)  AORTA:   The root exhibited upper limit of normal size (4 cm at the Sinsuses of Valsalva - 3 6 cm above the Sinsuses)     HISTORY: PRIOR HISTORY: Risk factors: hypertension, diabetes, hypercholesterolemia, and a former history of cigarette use (quitting more than one month ago)  PROCEDURE: The study was performed in the [address] [place] [address]  This was a routine study  The transthoracic approach was used   The study included complete 2D imaging, M-mode, complete spectral Doppler, and color Doppler  The   heart rate was 87 bpm, at the start of the study  LEFT VENTRICLE: Size was normal  Systolic function was normal  Ejection fraction was estimated to be 60 %  Although no diagnostic regional wall motion abnormality was identified, this possibility cannot be completely excluded on the basis   of this study  There was mild concentric hypertrophy  DOPPLER: Doppler parameters were consistent with abnormal left ventricular relaxation (grade 1 diastolic dysfunction)  RIGHT VENTRICLE: The size was normal  Systolic function was normal      LEFT ATRIUM: Size was normal      RIGHT ATRIUM: Size was normal      MITRAL VALVE: Valve structure was normal  DOPPLER: There was no evidence for stenosis  There was no regurgitation  AORTIC VALVE: The valve was trileaflet  Leaflets exhibited mild calcification  DOPPLER: There was no evidence for stenosis  There was no regurgitation  TRICUSPID VALVE: The valve structure was normal  DOPPLER: There was no regurgitation  The tricuspid jet envelope definition was inadequate for estimation of RV systolic pressure  There are no indirect findings (abnormal RV volume or   geometry, altered pulmonary flow velocity profile, or leftward septal displacement) which would suggest moderate or severe pulmonary hypertension  PULMONIC VALVE: Not well visualized  PERICARDIUM: There was no pericardial effusion  AORTA: The root exhibited upper limit of normal size (4 cm at the Sinsuses of Valsalva - 3 6 cm above the Sinsuses)     SYSTEMIC VEINS: IVC: The inferior vena cava was normal in size   Respirophasic changes were normal      SYSTEM MEASUREMENT TABLES     2D   %FS: 29 1 %   Ao Diam: 4 cm   EDV(Teich): 125 6 ml   EF(Teich): 55 5 %   ESV(Teich): 55 9 ml   IVSd: 1 1 cm   LA Area: 17 5 cm2   LA Diam: 3 5 cm   LVEDV MOD A4C: 109 1 ml   LVEF MOD A4C: 50 7 %   LVESV MOD A4C: 53 8 ml   LVIDd: 5 1 cm   LVIDs: 3 6 cm LVLd A4C: 8 4 cm   LVLs A4C: 7 4 cm   LVPWd: 1 cm   RA Area: 14 7 cm2   RVIDd: 3 4 cm   SV MOD A4C: 55 3 ml   SV(Teich): 69 7 ml     MM   TAPSE: 2 5 cm     PW   AVC: 354 1 ms   E': 0 1 m/s   E/E': 9 7   MV A Hermann: 0 8 m/s   MV Dec Jeff [place]: 4 3 m/s2   MV DecT: 147 8 ms   MV E Hermann: 0 6 m/s   MV E/A Ratio: 0 8   MV PHT: 42 9 ms   MVA By PHT: 5 1 cm2     [facility] [place] [facility] Accredited Echocardiography Laboratory     Prepared and electronically signed by     Kylee Polanco MD   Signed [id number] 12:18:57

## 2018-01-23 NOTE — RESULT NOTES
Verified Results  HOLTER MONITOR - Diane Gutiérrez 115 [id number] [address] Order Number: [id number]    - Patient Instructions: To schedule this appointment, please contact Central Scheduling at [phone]  Test Name Result Flag Reference   HOLTER MONITOR - 48 HOUR (Report)     PT NAME: Denis Luque   : 1945 AGE: 81 y o  GENDER: male   MRN: [id number]  PROCEDURE: Holter monitor - 48 hour         INDICATIONS: Palpitations       FINDINGS:     Holter monitoring revealed predominant sinus rhythm with an average heart rate of 97 BPM, a minimum heart rate of 62 BPM, and a maximum heart rate of 145 BPM      There were about 1186 ventricular ectopic beats, mostly occurring as single PVC's with no evidence of ventricular tachycardia  There was 4 beats of ventricular bigeminy  There was a 4 beat run of nonsustained ventricular tachycardia  There were about 25 supraventricular ectopic beats, mostly occurring as single PAC's and late beats with no evidence of atrial fibrillation or atrial flutter  For approximately 7-8 hours in the afternoon to evening on the  day, the heart rate was increased in the 130s to 140s range  It appears that the heart rate abruptly increased and then abruptly decreased which suggests a supraventricular tachycardia  The morphology is most consistent with sinus tachycardia or atrial tachycardia  There was no evidence of significant bradyarrhythmia or advanced heart block  The longest R-R interval was 1 4 seconds  The patient's symptom diary reported no symptoms         Plan  Tachycardia    · 1 - Deidra Escalante  (Cardiology) Co-Management  *  Status: Active  Requested for:  [id number]  Care Summary provided  : Yes

## 2018-01-31 RX ORDER — ERGOCALCIFEROL 1.25 MG/1
2000 CAPSULE ORAL
COMMUNITY
Start: 2011-11-30 | End: 2018-08-22 | Stop reason: ALTCHOICE

## 2018-01-31 RX ORDER — BENAZEPRIL HYDROCHLORIDE 20 MG/1
1 TABLET ORAL DAILY
COMMUNITY
Start: 2014-01-29 | End: 2019-09-23 | Stop reason: ALTCHOICE

## 2018-01-31 RX ORDER — AMOXICILLIN 500 MG
1 CAPSULE ORAL DAILY
COMMUNITY
End: 2019-09-23 | Stop reason: ALTCHOICE

## 2018-01-31 RX ORDER — PAROXETINE HYDROCHLORIDE 20 MG/1
1 TABLET, FILM COATED ORAL DAILY
COMMUNITY
Start: 2011-03-15 | End: 2018-08-22 | Stop reason: ALTCHOICE

## 2018-01-31 RX ORDER — CALCIPOTRIENE 50 UG/G
OINTMENT TOPICAL 2 TIMES DAILY
COMMUNITY
Start: 2015-06-05 | End: 2018-08-22 | Stop reason: ALTCHOICE

## 2018-01-31 RX ORDER — UBIDECARENONE 75 MG
1 CAPSULE ORAL DAILY
COMMUNITY
End: 2019-09-23 | Stop reason: ALTCHOICE

## 2018-01-31 RX ORDER — CLOBETASOL PROPIONATE 0.5 MG/G
CREAM TOPICAL 2 TIMES DAILY
COMMUNITY
Start: 2015-06-05 | End: 2018-08-22 | Stop reason: ALTCHOICE

## 2018-01-31 RX ORDER — LIRAGLUTIDE 6 MG/ML
INJECTION SUBCUTANEOUS
Refills: 0 | COMMUNITY
Start: 2017-11-01 | End: 2018-08-22 | Stop reason: SINTOL

## 2018-01-31 RX ORDER — AMLODIPINE BESYLATE 5 MG/1
1 TABLET ORAL DAILY
COMMUNITY
Start: 2014-01-29 | End: 2018-01-31 | Stop reason: SDUPTHER

## 2018-01-31 RX ORDER — METOPROLOL SUCCINATE 50 MG/1
1 TABLET, EXTENDED RELEASE ORAL DAILY
COMMUNITY
Start: 2017-11-06 | End: 2018-02-02 | Stop reason: SDUPTHER

## 2018-02-02 ENCOUNTER — OFFICE VISIT (OUTPATIENT)
Dept: CARDIOLOGY CLINIC | Facility: CLINIC | Age: 73
End: 2018-02-02
Payer: MEDICARE

## 2018-02-02 VITALS
RESPIRATION RATE: 16 BRPM | DIASTOLIC BLOOD PRESSURE: 76 MMHG | HEART RATE: 81 BPM | SYSTOLIC BLOOD PRESSURE: 138 MMHG | BODY MASS INDEX: 33.18 KG/M2 | HEIGHT: 69 IN | WEIGHT: 224 LBS

## 2018-02-02 DIAGNOSIS — R00.2 PALPITATIONS: Primary | ICD-10-CM

## 2018-02-02 PROCEDURE — 99205 OFFICE O/P NEW HI 60 MIN: CPT | Performed by: INTERNAL MEDICINE

## 2018-02-02 PROCEDURE — 93000 ELECTROCARDIOGRAM COMPLETE: CPT | Performed by: INTERNAL MEDICINE

## 2018-02-02 RX ORDER — METOPROLOL SUCCINATE 50 MG/1
100 TABLET, EXTENDED RELEASE ORAL DAILY
Qty: 90 TABLET | Refills: 3 | Status: SHIPPED | OUTPATIENT
Start: 2018-02-02 | End: 2018-10-22 | Stop reason: SDUPTHER

## 2018-02-02 NOTE — PROGRESS NOTES
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 has three children who are all healthy and and five grandchildren    1  Palpitations  POCT ECG    metoprolol succinate (TOPROL-XL) 50 mg 24 hr tablet   atrial tachycardia  There is no problem list on file for this patient  No past medical history on file  Social History     Social History    Marital status: /Civil Union     Spouse name: N/A    Number of children: N/A    Years of education: N/A     Occupational History    Not on file  Social History Main Topics    Smoking status: Not on file    Smokeless tobacco: Not on file    Alcohol use Not on file    Drug use: Unknown    Sexual activity: Not on file     Other Topics Concern    Not on file     Social History Narrative    No narrative on file      No family history on file    No family history of early heart disease    Current Outpatient Prescriptions:     benazepril (LOTENSIN) 20 mg tablet, Take 1 tablet by mouth daily, Disp: , Rfl:     calcipotriene (DOVONOX) 0 005 % ointment, Apply topically 2 (two) times a day, Disp: , Rfl:     clobetasol (TEMOVATE) 0 05 % cream, Apply topically 2 (two) times a day, Disp: , Rfl:     cyanocobalamin (VITAMIN B-12) 100 mcg tablet, Take 1 tablet by mouth daily, Disp: , Rfl:     ergocalciferol (VITAMIN D2) 50,000 units, Take by mouth, Disp: , Rfl:    fluocinonide (LIDEX) 0 05 % cream, , Disp: , Rfl: 0    metFORMIN (GLUCOPHAGE) 1000 MG tablet, Take 1 tablet by mouth 2 (two) times a day, Disp: , Rfl:     metoprolol succinate (TOPROL-XL) 50 mg 24 hr tablet, Take 2 tablets (100 mg total) by mouth daily, Disp: 90 tablet, Rfl: 3    Multiple Vitamin (MULTI-DAY VITAMINS PO), Take 1 tablet by mouth daily, Disp: , Rfl:     Omega-3 Fatty Acids (FISH OIL) 1200 MG CAPS, Take 1 capsule by mouth daily, Disp: , Rfl:     PARoxetine (PAXIL) 20 mg tablet, Take 1 tablet by mouth daily, Disp: , Rfl:     VICTOZA 18 MG/3ML SOPN, , Disp: , Rfl: 0  Allergies   Allergen Reactions    Pioglitazone     Sulfa Antibiotics      Vitals:    02/02/18 1028   BP: 138/76   BP Location: Right arm   Patient Position: Sitting   Cuff Size: Standard   Pulse: 81   Resp: 16   Weight: 102 kg (224 lb)   Height: 5' 9" (1 753 m)       Labs:  Appointment on 11/07/2017   Component Date Value    TSH 3RD GENERATON 11/07/2017 0 490     WBC 11/07/2017 10 26*    RBC 11/07/2017 4 95     Hemoglobin 11/07/2017 14 9     Hematocrit 11/07/2017 44 8     MCV 11/07/2017 91     MCH 11/07/2017 30 1     MCHC 11/07/2017 33 3     RDW 11/07/2017 14 5     MPV 11/07/2017 10 7     Platelets 40/60/4711 271     nRBC 11/07/2017 0     Neutrophils Relative 11/07/2017 63     Lymphocytes Relative 11/07/2017 26     Monocytes Relative 11/07/2017 8     Eosinophils Relative 11/07/2017 3     Basophils Relative 11/07/2017 0     Neutrophils Absolute 11/07/2017 6 48     Lymphocytes Absolute 11/07/2017 2 62     Monocytes Absolute 11/07/2017 0 79     Eosinophils Absolute 11/07/2017 0 28     Basophils Absolute 11/07/2017 0 03    Appointment on 11/02/2017   Component Date Value    Hemoglobin A1C 11/02/2017 10 5*    EAG 11/02/2017 255     Sodium 11/02/2017 136     Potassium 11/02/2017 4 0     Chloride 11/02/2017 102     CO2 11/02/2017 26     Anion Gap 11/02/2017 8     BUN 11/02/2017 16     Creatinine 11/02/2017 0 79  Glucose, Fasting 11/02/2017 282*    Calcium 11/02/2017 9 1     AST 11/02/2017 12     ALT 11/02/2017 19     Alkaline Phosphatase 11/02/2017 64     Total Protein 11/02/2017 7 4     Albumin 11/02/2017 3 6     Total Bilirubin 11/02/2017 0 32     eGFR 11/02/2017 92      Imaging: No results found  Review of Systems: no chest pain with exertion no shortness of breath no palpitations no lightheadedness he does have a tremor  All other 12 point review of systems negative for complaints today  Review of Systems    Physical Exam:   GEN: NAD, Alert and oriented, well appearing  HEENT:Head, neck, ears, oral pharynx: Mucus membranes moist, oral pharynx clear, nares clear  External ears normal  EYES: Pupils equal, sclera anicteric  NECK: No JVD  CARDIOVASCULAR: RRR, No murmur, rub, gallops S1,S2  LUNGS: Clear To auscultation bilaterally no rales rhonchi or wheezes  ABDOMEN: Soft, nondistended  EXTREMITIES/VASCULAR: No edema    PSYCH: Normal Affect  NEURO: Grossly intact, moving all extremiteis equal, face symetric  HEME: No bleeding, bruising, petechia  SKIN: No significant rashes 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      Physical Exam    Discussion/Summary: he has an atrial tachycardia I reviewed his  48 hour Holter monitor and he clearly had episodes of atrial tachycardia I explained to the patient and his wife who accompanies him that this can cause a problem as it can create a tachycardia induced cardiomyopathy fortunately I did not see enough of the tachycardia on at 48 hour Holter monitor to cause a weakened heart never the less my recommendation is to increase his Toprol-XL from 50 mg daily to 100 mg daily I will follow up with him in six months and I would like him to undergo a  24 hour Holter monitor prior to his six month follow-up    His hypertension is well controlled

## 2018-02-05 DIAGNOSIS — E11.9 TYPE 2 DIABETES MELLITUS WITHOUT COMPLICATIONS (HCC): ICD-10-CM

## 2018-02-05 DIAGNOSIS — E11.319 TYPE 2 DIABETES MELLITUS WITH RETINOPATHY WITHOUT MACULAR EDEMA (HCC): ICD-10-CM

## 2018-02-05 DIAGNOSIS — I10 ESSENTIAL (PRIMARY) HYPERTENSION: ICD-10-CM

## 2018-04-10 DIAGNOSIS — G25.0 TREMOR, ESSENTIAL: Primary | ICD-10-CM

## 2018-04-10 RX ORDER — PRIMIDONE 50 MG/1
150 TABLET ORAL 2 TIMES DAILY
Qty: 720 TABLET | Refills: 0 | Status: SHIPPED | OUTPATIENT
Start: 2018-04-10 | End: 2018-07-19 | Stop reason: SDUPTHER

## 2018-07-19 ENCOUNTER — OFFICE VISIT (OUTPATIENT)
Dept: NEUROLOGY | Facility: CLINIC | Age: 73
End: 2018-07-19
Payer: MEDICARE

## 2018-07-19 VITALS
HEIGHT: 69 IN | HEART RATE: 78 BPM | SYSTOLIC BLOOD PRESSURE: 126 MMHG | BODY MASS INDEX: 34.04 KG/M2 | DIASTOLIC BLOOD PRESSURE: 59 MMHG | WEIGHT: 229.8 LBS

## 2018-07-19 DIAGNOSIS — G25.0 TREMOR, ESSENTIAL: Primary | ICD-10-CM

## 2018-07-19 PROCEDURE — 99214 OFFICE O/P EST MOD 30 MIN: CPT | Performed by: NURSE PRACTITIONER

## 2018-07-19 RX ORDER — PRIMIDONE 50 MG/1
150 TABLET ORAL 2 TIMES DAILY
Qty: 540 TABLET | Refills: 3 | Status: SHIPPED | OUTPATIENT
Start: 2018-07-19 | End: 2019-09-20 | Stop reason: SDUPTHER

## 2018-07-19 RX ORDER — AMLODIPINE BESYLATE 5 MG/1
5 TABLET ORAL DAILY
COMMUNITY
End: 2019-09-23 | Stop reason: ALTCHOICE

## 2018-07-19 RX ORDER — MELATONIN
2000 2 TIMES DAILY
COMMUNITY
End: 2019-09-23 | Stop reason: ALTCHOICE

## 2018-07-19 NOTE — PROGRESS NOTES
Patient ID: Sadaf Ureña is a 81 y o  male  Assessment/Plan:    Tremor, essential  Stable exam, mild intentional tremor on FTN, slight no-no head tremor noted only when activated  No evidence of Parkinson's or parkinsonism on exam  He continues to feel that he is functioning well at home  As such will have him continue on primidone 50mg 3 tabs BID  He will call with any new or worsening symptoms  Subjective:    Mr Sadaf Ureña is a 81year old right-handed man with essential tremor who presents for follow up  To review, action tremor started around 2009 in the right hand and spread to his left hand  At his last visit his tremor was noted to have progressed but he had found ways to adapt to it  He was not interested in increasing his medications and was also not interested in surgical options  He remained on primidone 50mg 3 tabs bid  Today mr Marek Carson presents for follow-up accompanied by his wife  He states that things are stable  He is currently on primidone 50mg 3 atbs BID  He does feel that this medication has helped  He only notices the tremors in his hands when he holds something  He does occasionally notice some head tremor, described as a no-no  No tremors in his voice or legs  He does not feel that it interferes with eating or drinking as he has learned to adapt  His handwriting is bad and he no longer writes checks  He remains independent in all ADLs, but does find buttons difficult  He denies dizziness or dysphagia  He sleeps well  He denies any changes in his walking and denies any recent falls  The following portions of the patient's history were reviewed and updated as appropriate: past family history, past social history and past surgical history        Current Outpatient Prescriptions:     amLODIPine (NORVASC) 5 mg tablet, Take 5 mg by mouth daily, Disp: , Rfl:     benazepril (LOTENSIN) 20 mg tablet, Take 1 tablet by mouth daily, Disp: , Rfl:     cholecalciferol (VITAMIN D3) 1,000 units tablet, Take 2,000 Units by mouth 2 (two) times a day, Disp: , Rfl:     cyanocobalamin (VITAMIN B-12) 100 mcg tablet, Take 1 tablet by mouth daily, Disp: , Rfl:     metFORMIN (GLUCOPHAGE) 1000 MG tablet, Take 1 tablet by mouth 2 (two) times a day, Disp: , Rfl:     metoprolol succinate (TOPROL-XL) 50 mg 24 hr tablet, Take 2 tablets (100 mg total) by mouth daily, Disp: 90 tablet, Rfl: 3    Multiple Vitamin (MULTI-DAY VITAMINS PO), Take 1 tablet by mouth daily, Disp: , Rfl:     Omega-3 Fatty Acids (FISH OIL) 1200 MG CAPS, Take 1 capsule by mouth daily, Disp: , Rfl:     primidone (MYSOLINE) 50 mg tablet, Take 3 tablets (150 mg total) by mouth 2 (two) times a day, Disp: 540 tablet, Rfl: 3    VICTOZA 18 MG/3ML SOPN, , Disp: , Rfl: 0    calcipotriene (DOVONOX) 0 005 % ointment, Apply topically 2 (two) times a day, Disp: , Rfl:     clobetasol (TEMOVATE) 0 05 % cream, Apply topically 2 (two) times a day, Disp: , Rfl:     ergocalciferol (VITAMIN D2) 50,000 units, Take by mouth, Disp: , Rfl:     fluocinonide (LIDEX) 0 05 % cream, , Disp: , Rfl: 0    PARoxetine (PAXIL) 20 mg tablet, Take 1 tablet by mouth daily, Disp: , Rfl:      Objective:    Blood pressure 126/59, pulse 78, height 5' 9" (1 753 m), weight 104 kg (229 lb 12 8 oz)  Physical Exam   Constitutional: He appears well-developed and well-nourished  HENT:   Head: Normocephalic  Eyes: EOM are normal  Pupils are equal, round, and reactive to light  Neurological: Gait normal    Psychiatric: He has a normal mood and affect  His speech is normal and behavior is normal    Vitals reviewed  Neurological Exam    Mental Status  The patient is alert  His speech is normal  He has normal attention span and concentration  He follows multi-step commands  He has a normal fund of knowledge      Cranial Nerves    CN II: The patient's visual acuity and visual fields are normal   CN III, IV, VI: The patient's pupils are equally round and reactive to light and ocular movements are normal   CN V: The patient has normal facial sensation  CN VII:  The patient has symmetric facial movement  CN VIII:  The patient's hearing is normal   CN IX, X: The patient has symmetric palate movement and normal gag reflex  CN XI: The patient's shoulder shrug strength is normal   CN XII: The patient's tongue is midline without atrophy or fasciculations  Motor    Normal speech and facial expression  No rest tremor  Very slight postural tremor in left hand only  Rare, mild no-no head tremor noted when distracted  Moderate intentional tremor on FTN  No bradykinesia, rigidity, dystonia, or dyskinesia    Handwriting is legible, very mildly tremulous, and normal in size   pen with all fingers to write  Spirals mildly tremulous, worse on left  Gait and Coordination  The patient has normal gait and station  ROS:    Review of Systems   Constitutional: Negative  HENT: Negative  Eyes: Negative  Respiratory: Negative  Cardiovascular: Negative  Gastrointestinal: Negative  Endocrine: Negative  Genitourinary: Negative  Musculoskeletal: Negative  Skin: Negative  Allergic/Immunologic: Negative  Neurological: Positive for tremors  Hematological: Negative  Psychiatric/Behavioral: Negative

## 2018-07-19 NOTE — ASSESSMENT & PLAN NOTE
Stable exam, mild intentional tremor on FTN, slight no-no head tremor noted only when activated  No evidence of Parkinson's or parkinsonism on exam  He continues to feel that he is functioning well at home  As such will have him continue on primidone 50mg 3 tabs BID  He will call with any new or worsening symptoms

## 2018-07-19 NOTE — PATIENT INSTRUCTIONS
1  Continue on primidone 50mg 3 tabs (150mg) twice daily  2  Call with any new or worsening symptoms

## 2018-08-22 ENCOUNTER — OFFICE VISIT (OUTPATIENT)
Dept: FAMILY MEDICINE CLINIC | Facility: CLINIC | Age: 73
End: 2018-08-22
Payer: MEDICARE

## 2018-08-22 VITALS
SYSTOLIC BLOOD PRESSURE: 120 MMHG | OXYGEN SATURATION: 95 % | HEART RATE: 84 BPM | BODY MASS INDEX: 34.1 KG/M2 | HEIGHT: 68 IN | WEIGHT: 225 LBS | DIASTOLIC BLOOD PRESSURE: 70 MMHG | RESPIRATION RATE: 18 BRPM

## 2018-08-22 DIAGNOSIS — E55.9 VITAMIN D DEFICIENCY: ICD-10-CM

## 2018-08-22 DIAGNOSIS — Z12.5 PROSTATE CANCER SCREENING: ICD-10-CM

## 2018-08-22 DIAGNOSIS — L30.9 ECZEMA, UNSPECIFIED TYPE: ICD-10-CM

## 2018-08-22 DIAGNOSIS — E11.69 TYPE 2 DIABETES MELLITUS WITH OTHER SPECIFIED COMPLICATION, WITHOUT LONG-TERM CURRENT USE OF INSULIN (HCC): ICD-10-CM

## 2018-08-22 DIAGNOSIS — I10 ESSENTIAL HYPERTENSION: ICD-10-CM

## 2018-08-22 DIAGNOSIS — E11.319 TYPE 2 DIABETES MELLITUS WITH RETINOPATHY OF BOTH EYES, WITHOUT LONG-TERM CURRENT USE OF INSULIN, MACULAR EDEMA PRESENCE UNSPECIFIED, UNSPECIFIED RETINOPATHY SEVERITY (HCC): ICD-10-CM

## 2018-08-22 DIAGNOSIS — F32.9 REACTIVE DEPRESSION: ICD-10-CM

## 2018-08-22 DIAGNOSIS — G25.0 BENIGN FAMILIAL TREMOR: ICD-10-CM

## 2018-08-22 DIAGNOSIS — E53.8 VITAMIN B12 DEFICIENCY: ICD-10-CM

## 2018-08-22 DIAGNOSIS — Z00.00 MEDICARE ANNUAL WELLNESS VISIT, SUBSEQUENT: Primary | ICD-10-CM

## 2018-08-22 DIAGNOSIS — L40.9 PSORIASIS: ICD-10-CM

## 2018-08-22 PROBLEM — R00.0 TACHYCARDIA: Status: ACTIVE | Noted: 2017-11-06

## 2018-08-22 PROBLEM — R00.0 TACHYCARDIA: Status: RESOLVED | Noted: 2017-11-06 | Resolved: 2018-08-22

## 2018-08-22 PROCEDURE — G0439 PPPS, SUBSEQ VISIT: HCPCS | Performed by: FAMILY MEDICINE

## 2018-08-22 PROCEDURE — 99214 OFFICE O/P EST MOD 30 MIN: CPT | Performed by: FAMILY MEDICINE

## 2018-08-22 NOTE — PROGRESS NOTES
Assessment/Plan:    Depression  Depressed mood has resolved  He is no longer on paroxetine has been quite stable over the past year  Type 2 diabetes mellitus with ophthalmic complication, without long-term current use of insulin ([facility] )  Lab Results   Component Value Date    HGBA1C 10 5 (H) 11/02/2017     He has had some poor control of his diabetes  He is now taking a supplement called glucoburn as he was having some increased rash across his abdomen on Victoza  Continue with high-dose metformin  Repeat labs within the next 2-3 months to see how his diabetic control is  I would encourage continued exercise and monitoring of his diet    Diabetic retinopathy ([facility] )  Lab Results   Component Value Date    HGBA1C 10 5 (H) 11/02/2017       No results for input(s): POCGLU in the last 72 hours  Blood Sugar Average: Last 72 hrs:      Sleep apnea  On CPAP    Essential hypertension  Well controlled  No changes  Benign familial tremor  Continue with Neurology follow-up  This seems to be improved on Mysoline  Psoriasis  His psoriasis is quite significant  I offered re-evaluation with Dermatology but he would like to hold off for the time being  He does have multiple creams at home including Lidex and calcipotriene to utilize as needed  Vitamin B12 deficiency  Check B12    Vitamin D deficiency  Check vitamin-D       Diagnoses and all orders for this visit:    Medicare annual wellness visit, subsequent    Type 2 diabetes mellitus with other specified complication, without long-term current use of insulin ([facility] )  -     Cancel: Microalbumin / creatinine urine ratio  -     HEMOGLOBIN A1C W/ EAG ESTIMATION; Future  -     TSH, 3rd generation with Free T4 reflex; Future  -     Microalbumin / creatinine urine ratio;  Future    Type 2 diabetes mellitus with retinopathy of both eyes, without long-term current use of insulin, macular edema presence unspecified, unspecified retinopathy severity ([facility] )  -     Comprehensive metabolic panel; Future  -     Lipid Panel with Direct LDL reflex; Future  -     metFORMIN (GLUCOPHAGE) 1000 MG tablet; Take 1 tablet (1,000 mg total) by mouth 2 (two) times a day    Essential hypertension  -     Comprehensive metabolic panel; Future  -     CBC and differential; Future    Benign familial tremor    Psoriasis  -     TSH, 3rd generation with Free T4 reflex; Future    Vitamin D deficiency  -     Vitamin D 25 hydroxy; Future    Vitamin B12 deficiency  -     Vitamin B12; Future    Reactive depression    Eczema, unspecified type  -     TSH, 3rd generation with Free T4 reflex; Future    Prostate cancer screening  -     PSA, Total Screen; Future          Subjective:      Patient ID: Lalito Forrest is a 81 y o  male  HPI patient presents today for follow-up for his chronic health issues  His type 2 diabetes has been poorly controlled recently  He was having some side effects from Victoza so he stopped that and is now taking an over-the-counter supplement  He feels much better on this supplement and feels his sugars are probably improving  He just started the supplement within the past month  He denies excessive polyuria or polydipsia  He has had no vision changes  His history of hypertension and denies chest pain, shortness of breath, palpitations or lightheadedness  He has been quite active without any cardiovascular limitations  He has a history of vitamin-D as well as B12 deficiencies  This has not been checked recently  He denies any significant myalgias and notes he is actually feeling relatively well  The following portions of the patient's history were reviewed and updated as appropriate: allergies, current medications, past family history, past medical history, past social history, past surgical history and problem list     Review of Systems   Constitutional: Negative for appetite change, chills, fatigue, fever and unexpected weight change  HENT: Negative for trouble swallowing  Eyes: Negative for visual disturbance  Respiratory: Negative for cough, chest tightness, shortness of breath and wheezing  Cardiovascular: Negative for chest pain  Gastrointestinal: Negative for abdominal distention, abdominal pain, blood in stool, constipation and diarrhea  Endocrine: Negative for polyuria  Genitourinary: Negative for difficulty urinating and flank pain  Musculoskeletal: Negative for arthralgias and myalgias  Skin: Negative for rash  Neurological: Negative for dizziness and light-headedness  Hematological: Negative for adenopathy  Does not bruise/bleed easily  Psychiatric/Behavioral: Negative for sleep disturbance  Objective:      /70   Pulse 84   Resp 18   Ht 5' 7 5" (1 715 m)   Wt 102 kg (225 lb)   SpO2 95%   BMI 34 72 kg/m²          Physical Exam   Constitutional: He is oriented to person, place, and time  He appears well-developed and well-nourished  No distress  HENT:   Head: Normocephalic and atraumatic  Right Ear: External ear normal    Left Ear: External ear normal    Mouth/Throat: Oropharynx is clear and moist  No oropharyngeal exudate  Eyes: EOM are normal  Pupils are equal, round, and reactive to light  Right eye exhibits no discharge  Left eye exhibits no discharge  No scleral icterus  Neck: Normal carotid pulses present  Carotid bruit is not present  No tracheal deviation, no edema and no erythema present  No thyromegaly present  Cardiovascular: Normal rate, regular rhythm and normal heart sounds  Exam reveals no gallop  No murmur heard  Pulmonary/Chest: Effort normal  No stridor  No tachypnea  No respiratory distress  He has no wheezes  He has no rales  Abdominal: Soft  Bowel sounds are normal  He exhibits no distension and no mass  There is no hepatosplenomegaly  There is no tenderness  There is no rebound, no guarding and no CVA tenderness  Musculoskeletal: Normal range of motion   He exhibits no edema, tenderness or deformity  Lymphadenopathy:     He has no cervical adenopathy  Neurological: He is alert and oriented to person, place, and time  He displays normal reflexes  No cranial nerve deficit  He exhibits normal muscle tone  Coordination normal    Skin: Skin is warm  Rash noted  He is not diaphoretic  There is erythema  There is pallor  He has significant diffuse psoriasis   Psychiatric: His speech is normal and behavior is normal  Judgment and thought content normal  His mood appears not anxious  Cognition and memory are normal  He does not exhibit a depressed mood

## 2018-08-22 NOTE — ASSESSMENT & PLAN NOTE
Depressed mood has resolved  He is no longer on paroxetine has been quite stable over the past year

## 2018-08-22 NOTE — PROGRESS NOTES
Assessment and Plan:    Problem List Items Addressed This Visit     None      Visit Diagnoses     Medicare annual wellness visit, subsequent    -  Primary    Type 2 diabetes mellitus with other specified complication, without long-term current use of insulin [facility])            Health Maintenance Due   Topic Date Due    Medicare Annual Wellness Visit (AWV)  1945    DXA SCAN  1945    CRC Screening: Colonoscopy  1945    HEMOGLOBIN A1C  05/02/2018    URINE MICROALBUMIN  05/12/2018    Diabetic Foot Exam  05/17/2018     Patient presents today for a Medicare wellness visit  Overall, he is doing quite well  He has no current issues with cognitive decline or depression  He rates his overall health is very good  He is remaining physically active and having no cardiovascular limitations  He has declined all vaccines in adulthood and continues to do so  He has declined any cancer screening including prostate and colon cancer screening  We discussed the benefits of colon cancer prostate cancer screening multiple times  HPI:  Nessa Rincon is a 81 y o  male here for his Subsequent Wellness Visit      Patient Active Problem List   Diagnosis    Tremor, essential     Past Medical History:   Diagnosis Date    Hypercholesterolemia     Intermittent claudication ([facility] )     Memory loss     last assessed 07/16/14    Mild cognitive impairment     last assessed 06/22/16    Vitamin D deficiency      Past Surgical History:   Procedure Laterality Date    TONSILLECTOMY AND ADENOIDECTOMY       Family History   Problem Relation Age of Onset    Melanoma Father         skin     History   Smoking Status    Former Smoker    Quit date: 07/2007   Smokeless Tobacco    Never Used     History   Alcohol Use    Yes     Comment: social; rare      History   Drug use: Unknown       Current Outpatient Prescriptions   Medication Sig Dispense Refill    amLODIPine (NORVASC) 5 mg tablet Take 5 mg by mouth daily      benazepril (LOTENSIN) 20 mg tablet Take 1 tablet by mouth daily      cholecalciferol (VITAMIN D3) 1,000 units tablet Take 2,000 Units by mouth 2 (two) times a day      cyanocobalamin (VITAMIN B-12) 100 mcg tablet Take 1 tablet by mouth daily      metFORMIN (GLUCOPHAGE) 1000 MG tablet Take 1 tablet by mouth 2 (two) times a day      metoprolol succinate (TOPROL-XL) 50 mg 24 hr tablet Take 2 tablets (100 mg total) by mouth daily 90 tablet 3    Multiple Vitamin (MULTI-DAY VITAMINS PO) Take 1 tablet by mouth daily      Omega-3 Fatty Acids (FISH OIL) 1200 MG CAPS Take 1 capsule by mouth daily      primidone (MYSOLINE) 50 mg tablet Take 3 tablets (150 mg total) by mouth 2 (two) times a day 540 tablet 3    VICTOZA 18 MG/3ML SOPN   0    PARoxetine (PAXIL) 20 mg tablet Take 1 tablet by mouth daily       No current facility-administered medications for this visit  Allergies   Allergen Reactions    Pioglitazone     Sulfa Antibiotics      There is no immunization history for the selected administration types on file for this patient  Patient Care Team:  Lila Simmons MD as PCP - General    Medicare Screening Tests and Risk Assessments:      Health Risk Assessment:  Patient rates overall health as good  Patient feels that their physical health rating is Slightly better  Eyesight was rated as Same  Hearing was rated as Same  Patient feels that their emotional and mental health rating is Same  Pain experienced by patient in the last 7 days has been None  Emotional/Mental Health:  Patient has been feeling nervous/anxious  PHQ-9 Depression Screening:    Frequency of the following problems over the past two weeks:      1  Little interest or pleasure in doing things: 0 - not at all      2  Feeling down, depressed, or hopeless: 0 - not at all  PHQ-2 Score: 0          Broken Bones/Falls:     Fall Risk Assessment:    In the past year, patient has experienced: No history of falling in past year          Bladder/Bowel:  Patient has not leaked urine accidently in the last six months  Patient reports no loss of bowel control  Immunizations:  Patient has not had a flu vaccination within the last year  Patient has not received a pneumonia shot  Patient has not received a shingles shot  Patient has not received tetanus/diphtheria shot  Home Safety:  Patient does not have trouble with stairs inside or outside of their home  Patient currently reports that there are no safety hazards present in home, working smoke alarms, no working carbon monoxide detectors  Preventative Screenings:   no prostate cancer screen performed, no colon cancer screen completed, no cholesterol screen completed, no glaucoma eye exam completed    Nutrition:  Current diet: Regular and Limited junk food with servings of the following:    Medications:  Patient is currently taking over-the-counter supplements  Patient is able to manage medications  Lifestyle Choices:  Patient reports no tobacco use  Patient has smoked or used tobacco in the past   Patient has not stopped tobacco use  Patient reports alcohol use  Patient drives a vehicle  Patient wears seat belt  Activities of Daily Living:  Can get out of bed by his or her self, able to dress self, able to make own meals, able to do own shopping, able to bathe self, can do own laundry/housekeeping, can manage own money, pay bills and track expenses    Previous Hospitalizations:  No hospitalization or ED visit in past 12 months        Advanced Directives:  Patient has decided on a power of   Patient has spoken to designated power of   Patient has completed advanced directive  Patient's shoes and socks removed  Right Foot/Ankle   Right Foot Inspection  Skin Exam: skin normal, skin intact and dry skin no warmth, no callus, no erythema, no maceration, no abnormal color, no pre-ulcer, no ulcer and no callus Sensory   Vibration: intact  Proprioception: intact   Monofilament testing: intact  Vascular    The right DP pulse is 1+  The right PT pulse is 1+  Left Foot/Ankle  Left Foot Inspection  Skin Exam: skin normal, skin intact and dry skinno warmth, no erythema, no maceration, normal color, no pre-ulcer, no ulcer and no callus                                         Sensory   Vibration: intact  Proprioception: intact  Monofilament: diminished  Vascular    The left DP pulse is 1+  The left PT pulse is 1+  Assign Risk Category:  No deformity present; Loss of protective sensation;  No weak pulses       Risk: 1

## 2018-08-22 NOTE — ASSESSMENT & PLAN NOTE
Lab Results   Component Value Date    HGBA1C 10 5 (H) 11/02/2017       No results for input(s): POCGLU in the last 72 hours      Blood Sugar Average: Last 72 hrs:

## 2018-08-22 NOTE — ASSESSMENT & PLAN NOTE
His psoriasis is quite significant  I offered re-evaluation with Dermatology but he would like to hold off for the time being  He does have multiple creams at home including Lidex and calcipotriene to utilize as needed

## 2018-08-22 NOTE — ASSESSMENT & PLAN NOTE
Lab Results   Component Value Date    HGBA1C 10 5 (H) 11/02/2017     He has had some poor control of his diabetes  He is now taking a supplement called glucoburn as he was having some increased rash across his abdomen on Victoza  Continue with high-dose metformin  Repeat labs within the next 2-3 months to see how his diabetic control is    I would encourage continued exercise and monitoring of his diet

## 2018-09-05 LAB
LEFT EYE DIABETIC RETINOPATHY: NORMAL
RIGHT EYE DIABETIC RETINOPATHY: NORMAL

## 2018-10-22 DIAGNOSIS — R00.2 PALPITATIONS: ICD-10-CM

## 2018-10-22 RX ORDER — METOPROLOL SUCCINATE 50 MG/1
100 TABLET, EXTENDED RELEASE ORAL DAILY
Qty: 180 TABLET | Refills: 1 | Status: SHIPPED | OUTPATIENT
Start: 2018-10-22 | End: 2019-06-27 | Stop reason: SDUPTHER

## 2019-02-04 ENCOUNTER — TELEPHONE (OUTPATIENT)
Dept: NEUROLOGY | Facility: CLINIC | Age: 74
End: 2019-02-04

## 2019-03-11 ENCOUNTER — APPOINTMENT (OUTPATIENT)
Dept: LAB | Facility: CLINIC | Age: 74
End: 2019-03-11
Payer: MEDICARE

## 2019-03-11 DIAGNOSIS — L40.9 PSORIASIS: ICD-10-CM

## 2019-03-11 DIAGNOSIS — E11.69 TYPE 2 DIABETES MELLITUS WITH OTHER SPECIFIED COMPLICATION, WITHOUT LONG-TERM CURRENT USE OF INSULIN (HCC): ICD-10-CM

## 2019-03-11 DIAGNOSIS — E11.319 TYPE 2 DIABETES MELLITUS WITH RETINOPATHY OF BOTH EYES, WITHOUT LONG-TERM CURRENT USE OF INSULIN, MACULAR EDEMA PRESENCE UNSPECIFIED, UNSPECIFIED RETINOPATHY SEVERITY (HCC): ICD-10-CM

## 2019-03-11 DIAGNOSIS — I10 ESSENTIAL HYPERTENSION: ICD-10-CM

## 2019-03-11 DIAGNOSIS — E55.9 VITAMIN D DEFICIENCY: ICD-10-CM

## 2019-03-11 DIAGNOSIS — Z12.5 PROSTATE CANCER SCREENING: ICD-10-CM

## 2019-03-11 DIAGNOSIS — L30.9 ECZEMA, UNSPECIFIED TYPE: ICD-10-CM

## 2019-03-11 DIAGNOSIS — E53.8 VITAMIN B12 DEFICIENCY: ICD-10-CM

## 2019-03-11 LAB
25(OH)D3 SERPL-MCNC: 30.4 NG/ML (ref 30–100)
ALBUMIN SERPL BCP-MCNC: 3.5 G/DL (ref 3.5–5)
ALP SERPL-CCNC: 65 U/L (ref 46–116)
ALT SERPL W P-5'-P-CCNC: 17 U/L (ref 12–78)
ANION GAP SERPL CALCULATED.3IONS-SCNC: 6 MMOL/L (ref 4–13)
AST SERPL W P-5'-P-CCNC: 12 U/L (ref 5–45)
BASOPHILS # BLD AUTO: 0.08 THOUSANDS/ΜL (ref 0–0.1)
BASOPHILS NFR BLD AUTO: 1 % (ref 0–1)
BILIRUB SERPL-MCNC: 0.31 MG/DL (ref 0.2–1)
BUN SERPL-MCNC: 15 MG/DL (ref 5–25)
CALCIUM SERPL-MCNC: 8.3 MG/DL (ref 8.3–10.1)
CHLORIDE SERPL-SCNC: 102 MMOL/L (ref 100–108)
CHOLEST SERPL-MCNC: 127 MG/DL (ref 50–200)
CO2 SERPL-SCNC: 28 MMOL/L (ref 21–32)
CREAT SERPL-MCNC: 0.76 MG/DL (ref 0.6–1.3)
CREAT UR-MCNC: 70.1 MG/DL
EOSINOPHIL # BLD AUTO: 0.58 THOUSAND/ΜL (ref 0–0.61)
EOSINOPHIL NFR BLD AUTO: 6 % (ref 0–6)
ERYTHROCYTE [DISTWIDTH] IN BLOOD BY AUTOMATED COUNT: 14.9 % (ref 11.6–15.1)
EST. AVERAGE GLUCOSE BLD GHB EST-MCNC: 237 MG/DL
GFR SERPL CREATININE-BSD FRML MDRD: 92 ML/MIN/1.73SQ M
GLUCOSE P FAST SERPL-MCNC: 235 MG/DL (ref 65–99)
HBA1C MFR BLD: 9.9 % (ref 4.2–6.3)
HCT VFR BLD AUTO: 43.2 % (ref 36.5–49.3)
HDLC SERPL-MCNC: 35 MG/DL (ref 40–60)
HGB BLD-MCNC: 13.8 G/DL (ref 12–17)
IMM GRANULOCYTES # BLD AUTO: 0.09 THOUSAND/UL (ref 0–0.2)
IMM GRANULOCYTES NFR BLD AUTO: 1 % (ref 0–2)
LDLC SERPL CALC-MCNC: 75 MG/DL (ref 0–100)
LYMPHOCYTES # BLD AUTO: 2.11 THOUSANDS/ΜL (ref 0.6–4.47)
LYMPHOCYTES NFR BLD AUTO: 20 % (ref 14–44)
MCH RBC QN AUTO: 29.9 PG (ref 26.8–34.3)
MCHC RBC AUTO-ENTMCNC: 31.9 G/DL (ref 31.4–37.4)
MCV RBC AUTO: 94 FL (ref 82–98)
MICROALBUMIN UR-MCNC: 71.6 MG/L (ref 0–20)
MICROALBUMIN/CREAT 24H UR: 102 MG/G CREATININE (ref 0–30)
MONOCYTES # BLD AUTO: 1.02 THOUSAND/ΜL (ref 0.17–1.22)
MONOCYTES NFR BLD AUTO: 10 % (ref 4–12)
NEUTROPHILS # BLD AUTO: 6.62 THOUSANDS/ΜL (ref 1.85–7.62)
NEUTS SEG NFR BLD AUTO: 62 % (ref 43–75)
NRBC BLD AUTO-RTO: 0 /100 WBCS
PLATELET # BLD AUTO: 225 THOUSANDS/UL (ref 149–390)
PMV BLD AUTO: 11 FL (ref 8.9–12.7)
POTASSIUM SERPL-SCNC: 4 MMOL/L (ref 3.5–5.3)
PROT SERPL-MCNC: 7.2 G/DL (ref 6.4–8.2)
PSA SERPL-MCNC: 0.4 NG/ML (ref 0–4)
RBC # BLD AUTO: 4.61 MILLION/UL (ref 3.88–5.62)
SODIUM SERPL-SCNC: 136 MMOL/L (ref 136–145)
TRIGL SERPL-MCNC: 86 MG/DL
TSH SERPL DL<=0.05 MIU/L-ACNC: 1 UIU/ML (ref 0.36–3.74)
VIT B12 SERPL-MCNC: 87 PG/ML (ref 100–900)
WBC # BLD AUTO: 10.5 THOUSAND/UL (ref 4.31–10.16)

## 2019-03-11 PROCEDURE — 80061 LIPID PANEL: CPT

## 2019-03-11 PROCEDURE — 83036 HEMOGLOBIN GLYCOSYLATED A1C: CPT

## 2019-03-11 PROCEDURE — 84443 ASSAY THYROID STIM HORMONE: CPT

## 2019-03-11 PROCEDURE — 80053 COMPREHEN METABOLIC PANEL: CPT

## 2019-03-11 PROCEDURE — 85025 COMPLETE CBC W/AUTO DIFF WBC: CPT

## 2019-03-11 PROCEDURE — 82306 VITAMIN D 25 HYDROXY: CPT

## 2019-03-11 PROCEDURE — G0103 PSA SCREENING: HCPCS

## 2019-03-11 PROCEDURE — 82570 ASSAY OF URINE CREATININE: CPT

## 2019-03-11 PROCEDURE — 82043 UR ALBUMIN QUANTITATIVE: CPT

## 2019-03-11 PROCEDURE — 36415 COLL VENOUS BLD VENIPUNCTURE: CPT

## 2019-03-11 PROCEDURE — 82607 VITAMIN B-12: CPT

## 2019-03-13 ENCOUNTER — OFFICE VISIT (OUTPATIENT)
Dept: FAMILY MEDICINE CLINIC | Facility: CLINIC | Age: 74
End: 2019-03-13
Payer: MEDICARE

## 2019-03-13 VITALS
WEIGHT: 231.5 LBS | OXYGEN SATURATION: 97 % | SYSTOLIC BLOOD PRESSURE: 122 MMHG | HEIGHT: 68 IN | DIASTOLIC BLOOD PRESSURE: 62 MMHG | HEART RATE: 68 BPM | BODY MASS INDEX: 35.09 KG/M2

## 2019-03-13 DIAGNOSIS — I10 ESSENTIAL HYPERTENSION: ICD-10-CM

## 2019-03-13 DIAGNOSIS — F32.9 REACTIVE DEPRESSION: ICD-10-CM

## 2019-03-13 DIAGNOSIS — L40.9 PSORIASIS: ICD-10-CM

## 2019-03-13 DIAGNOSIS — E11.319 DIABETIC RETINOPATHY OF BOTH EYES ASSOCIATED WITH TYPE 2 DIABETES MELLITUS, MACULAR EDEMA PRESENCE UNSPECIFIED, UNSPECIFIED RETINOPATHY SEVERITY (HCC): ICD-10-CM

## 2019-03-13 DIAGNOSIS — G47.33 OBSTRUCTIVE SLEEP APNEA SYNDROME: ICD-10-CM

## 2019-03-13 DIAGNOSIS — E11.319 TYPE 2 DIABETES MELLITUS WITH RETINOPATHY OF BOTH EYES, WITHOUT LONG-TERM CURRENT USE OF INSULIN, MACULAR EDEMA PRESENCE UNSPECIFIED, UNSPECIFIED RETINOPATHY SEVERITY (HCC): Primary | ICD-10-CM

## 2019-03-13 DIAGNOSIS — Z11.59 NEED FOR HEPATITIS C SCREENING TEST: ICD-10-CM

## 2019-03-13 DIAGNOSIS — E55.9 VITAMIN D DEFICIENCY: ICD-10-CM

## 2019-03-13 DIAGNOSIS — E53.8 VITAMIN B12 DEFICIENCY: ICD-10-CM

## 2019-03-13 DIAGNOSIS — G25.0 BENIGN FAMILIAL TREMOR: ICD-10-CM

## 2019-03-13 PROCEDURE — 99214 OFFICE O/P EST MOD 30 MIN: CPT | Performed by: FAMILY MEDICINE

## 2019-03-13 NOTE — ASSESSMENT & PLAN NOTE
Lab Results   Component Value Date    HGBA1C 9 9 (H) 03/11/2019     Diabetic control has improved but is still suboptimal   He agrees to have some blood work done prior to follow-up in 4 months time  He declines additional medication at this time but will work aggressively on dietary intervention  He is also taking a glucoburn supplement over-the-counter

## 2019-03-13 NOTE — PATIENT INSTRUCTIONS

## 2019-03-13 NOTE — PROGRESS NOTES
Assessment/Plan:       Problem List Items Addressed This Visit        Endocrine    Type 2 diabetes mellitus with ophthalmic complication, without long-term current use of insulin ([facility] [address] ) - Primary     Lab Results   Component Value Date    HGBA1C 9 9 (H) 03/11/2019     Diabetic control has improved but is still suboptimal   He agrees to have some blood work done prior to follow-up in 4 months time  He declines additional medication at this time but will work aggressively on dietary intervention  He is also taking a glucoburn supplement over-the-counter  Relevant Orders    Hemoglobin A1C    Comprehensive metabolic panel    Diabetic retinopathy ([facility] )       Respiratory    Sleep apnea     Continue on CPAP            Cardiovascular and Mediastinum    Essential hypertension     Continue with amlodipine  Nervous and Auditory    Benign familial tremor     Tremors about the same  Continue to monitor  He is on a beta-blocker as well as primidone  Musculoskeletal and Integument    Psoriasis       Other    Depression     Mood is stable despite the loss of his wife in December  Vitamin B12 deficiency    Vitamin D deficiency      Other Visit Diagnoses     Need for hepatitis C screening test        Relevant Orders    Hepatitis C antibody            Subjective:      Patient ID: Loida Chung is a 72 y o  male  HPI patient presents for follow-up for his chronic health issues  He has type 2 diabetes  A1c is come down but still remains above 9  He denies vision changes, polyuria or polydipsia  His wife passed away in December  He has been a bit depressed about this but feels he is moving forward with the help of his son  The patient presents today for a hypertension follow-up  Patient remains compliant with medications and denies any chest pain, shortness of breath, palpitations, lightheadedness or diaphoresis  His benign essential tremor remains relatively stable    He is seeing Neurology for this  He has history of vitamin-D deficiency and denies any current problems with myalgias  The following portions of the patient's history were reviewed and updated as appropriate: allergies, current medications, past family history, past medical history, past social history, past surgical history and problem list     Review of Systems   Constitutional: Negative for appetite change, chills, fatigue, fever and unexpected weight change  HENT: Negative for trouble swallowing  Eyes: Negative for visual disturbance  Respiratory: Negative for cough, chest tightness, shortness of breath and wheezing  Cardiovascular: Negative for chest pain  Gastrointestinal: Negative for abdominal distention, abdominal pain, blood in stool, constipation and diarrhea  Endocrine: Negative for polyuria  Genitourinary: Negative for difficulty urinating and flank pain  Musculoskeletal: Negative for arthralgias and myalgias  Skin: Negative for rash  Neurological: Negative for dizziness and light-headedness  Hematological: Negative for adenopathy  Does not bruise/bleed easily  Psychiatric/Behavioral: Negative for sleep disturbance  Objective:      /62 (BP Location: Left arm, Patient Position: Sitting, Cuff Size: Standard)   Pulse 68   Ht 5' 7 5" (1 715 m)   Wt 105 kg (231 lb 8 oz)   SpO2 97%   BMI 35 72 kg/m²          Physical Exam   Constitutional: He is oriented to person, place, and time  He appears well-developed and well-nourished  No distress  HENT:   Head: Normocephalic  Eyes: Pupils are equal, round, and reactive to light  Right eye exhibits no discharge  Left eye exhibits no discharge  Neck: No tracheal deviation present  No thyromegaly present  Cardiovascular: Normal rate, regular rhythm and normal heart sounds  No murmur heard  Pulmonary/Chest: Effort normal  No respiratory distress  He has no wheezes  He has no rales  Abdominal: Soft   He exhibits no distension  There is no tenderness  Musculoskeletal: Normal range of motion  He exhibits no edema  Lymphadenopathy:     He has no cervical adenopathy  Neurological: He is alert and oriented to person, place, and time  No cranial nerve deficit  Skin: Skin is warm  He is not diaphoretic  No erythema  Psychiatric: He has a normal mood and affect  Judgment and thought content normal          Anniece Skiff, MD  BMI Counseling: Body mass index is 35 72 kg/m²  Discussed the patient's BMI with him  The BMI is above average  BMI counseling and education was provided to the patient  Nutrition recommendations include reducing portion sizes, decreasing overall calorie intake and 3-5 servings of fruits/vegetables daily  Exercise recommendations include moderate aerobic physical activity for 150 minutes/week

## 2019-05-15 DIAGNOSIS — Z71.89 COMPLEX CARE COORDINATION: Primary | ICD-10-CM

## 2019-05-23 ENCOUNTER — PATIENT OUTREACH (OUTPATIENT)
Dept: FAMILY MEDICINE CLINIC | Facility: CLINIC | Age: 74
End: 2019-05-23

## 2019-05-28 ENCOUNTER — TELEPHONE (OUTPATIENT)
Dept: FAMILY MEDICINE CLINIC | Facility: CLINIC | Age: 74
End: 2019-05-28

## 2019-05-30 DIAGNOSIS — G47.33 OBSTRUCTIVE SLEEP APNEA SYNDROME: Primary | ICD-10-CM

## 2019-05-31 ENCOUNTER — TELEPHONE (OUTPATIENT)
Dept: FAMILY MEDICINE CLINIC | Facility: CLINIC | Age: 74
End: 2019-05-31

## 2019-05-31 ENCOUNTER — PATIENT OUTREACH (OUTPATIENT)
Dept: FAMILY MEDICINE CLINIC | Facility: CLINIC | Age: 74
End: 2019-05-31

## 2019-06-05 DIAGNOSIS — G47.33 OBSTRUCTIVE SLEEP APNEA SYNDROME: Primary | ICD-10-CM

## 2019-06-07 LAB
LEFT EYE DIABETIC RETINOPATHY: NORMAL
RIGHT EYE DIABETIC RETINOPATHY: NORMAL

## 2019-06-14 ENCOUNTER — HOSPITAL ENCOUNTER (OUTPATIENT)
Dept: SLEEP CENTER | Facility: CLINIC | Age: 74
Discharge: HOME | End: 2019-06-14
Payer: MEDICARE

## 2019-06-14 DIAGNOSIS — G47.33 OSA (OBSTRUCTIVE SLEEP APNEA): Primary | ICD-10-CM

## 2019-06-14 DIAGNOSIS — G47.33 OSA (OBSTRUCTIVE SLEEP APNEA): ICD-10-CM

## 2019-06-14 PROCEDURE — G0399 HOME SLEEP TEST/TYPE 3 PORTA: HCPCS

## 2019-06-14 PROCEDURE — G0399 HOME SLEEP TEST/TYPE 3 PORTA: HCPCS | Performed by: PSYCHIATRY & NEUROLOGY

## 2019-06-19 DIAGNOSIS — G47.33 OSA (OBSTRUCTIVE SLEEP APNEA): Primary | ICD-10-CM

## 2019-06-20 ENCOUNTER — TELEPHONE (OUTPATIENT)
Dept: SLEEP CENTER | Facility: CLINIC | Age: 74
End: 2019-06-20

## 2019-06-20 ENCOUNTER — OFFICE VISIT (OUTPATIENT)
Dept: SLEEP CENTER | Facility: CLINIC | Age: 74
End: 2019-06-20
Payer: MEDICARE

## 2019-06-20 VITALS
HEART RATE: 70 BPM | WEIGHT: 225 LBS | BODY MASS INDEX: 34.1 KG/M2 | DIASTOLIC BLOOD PRESSURE: 74 MMHG | HEIGHT: 68 IN | SYSTOLIC BLOOD PRESSURE: 142 MMHG

## 2019-06-20 DIAGNOSIS — I10 ESSENTIAL HYPERTENSION: ICD-10-CM

## 2019-06-20 DIAGNOSIS — E11.3293 TYPE 2 DIABETES MELLITUS WITH MILD NONPROLIFERATIVE RETINOPATHY OF BOTH EYES, WITHOUT LONG-TERM CURRENT USE OF INSULIN, MACULAR EDEMA PRESENCE UNSPECIFIED (HCC): ICD-10-CM

## 2019-06-20 DIAGNOSIS — G47.33 OSA (OBSTRUCTIVE SLEEP APNEA): Primary | ICD-10-CM

## 2019-06-20 DIAGNOSIS — G25.0 BENIGN FAMILIAL TREMOR: ICD-10-CM

## 2019-06-20 PROCEDURE — 99212 OFFICE O/P EST SF 10 MIN: CPT | Performed by: PSYCHIATRY & NEUROLOGY

## 2019-06-27 DIAGNOSIS — R00.2 PALPITATIONS: ICD-10-CM

## 2019-06-27 RX ORDER — METOPROLOL SUCCINATE 50 MG/1
TABLET, EXTENDED RELEASE ORAL
Qty: 180 TABLET | Refills: 1 | Status: SHIPPED | OUTPATIENT
Start: 2019-06-27 | End: 2019-12-17 | Stop reason: SDUPTHER

## 2019-07-17 ENCOUNTER — OFFICE VISIT (OUTPATIENT)
Dept: NEUROLOGY | Facility: CLINIC | Age: 74
End: 2019-07-17
Payer: MEDICARE

## 2019-07-17 VITALS
WEIGHT: 223.4 LBS | HEIGHT: 68 IN | BODY MASS INDEX: 33.86 KG/M2 | SYSTOLIC BLOOD PRESSURE: 130 MMHG | DIASTOLIC BLOOD PRESSURE: 80 MMHG

## 2019-07-17 DIAGNOSIS — G25.0 BENIGN FAMILIAL TREMOR: Primary | ICD-10-CM

## 2019-07-17 PROCEDURE — 99213 OFFICE O/P EST LOW 20 MIN: CPT | Performed by: PSYCHIATRY & NEUROLOGY

## 2019-07-17 NOTE — PATIENT INSTRUCTIONS
We discussed the option of increasing primidone but you have opted to keep on this dose given you have adapted to the tremor well  We will continue primidone 50mg 3 tabs twice daily  Call prior to follow up should any issue resolve

## 2019-07-17 NOTE — PROGRESS NOTES
Patient ID: Jose Alfredo Patton is a 72 y o  male  Assessment/Plan:    Benign familial tremor  Progression of tremors more so on the left which is most bothersome when holding objects  He has adapted to his tremor and does not wish to consider any adjustments in medication  He manages well  We discussed other options such as a weighted glove and stabilizing utensils, but he is not interested as he feels he is doing well  We will continue primidone 150mg bid  Diagnoses and all orders for this visit:    Benign familial tremor           Subjective:     Ms Jose Alfredo Patton is right-handed with essential tremor who present for follow up  To review, action tremor started around 2009 in the right hand and spread to his left hand  He was last seen by me in 2017  He was seen by PLACIDO Ahmadi July 2018  Review of her notes reveal, there was no clear progression and no changes were made to medication dose  He remains on primidone 50mg 3 tabs bid  He has a mild action tremor in both hands but it is worse on the left  Tremor is slightly worse on the left  He notices it more when holding objects  He will use two hands to hold a cup  He uses his right hand for most activities  Handwriting is tremulous  He therefore types on the computer  His daughter writes out checks on occasion which he can sigh  He has no difficulty with dressing or buttoning  He denies any tremor of the head  Legs or voice  He denies any changes in gait  He denies any changes in cognition  He is managing the finances online with no issues  He is never late for payments  He keeps track of the [employer]  He denies any daytime sedation since he started using his new CPAP machine       The following portions of the patient's history were reviewed and updated as appropriate: allergies, current medications, past family history, past medical history, past social history, past surgical history and problem list          Objective:    Blood pressure 130/80, height 5' 7 5" (1 715 m), weight 101 kg (223 lb 6 4 oz)  Physical Exam   Neurological: He has normal strength  Neurological Exam  Mental Status   Oriented to person, place, time and situation  Recent and remote memory are intact  Language is fluent with no aphasia  Attention and concentration are normal     Cranial Nerves  CN III, IV, VI: Extraocular movements intact bilaterally  CN V: Facial sensation is normal   CN VII: Full and symmetric facial movement  CN VIII: Hearing is normal   CN IX, X: Palate elevates symmetrically  CN XI: Shoulder shrug strength is normal   CN XII: Tongue midline without atrophy or fasciculations  Motor   Normal muscle tone  Strength is 5/5 throughout all four extremities  Sensory  Light touch is normal in upper and lower extremities  Coordination  Right: Finger-to-nose abnormality: Rapid alternating movement normal   Left: Finger-to-nose abnormality: Rapid alternating movement normal   Moderate tremor on FTN worse on left  Handwriting moderately tremulous  Spirals with mild tremor on right with him gripping pen in whole hand, moderate on left  No head, leg or vocal tremor       Gait  Casual gait is normal including stance, stride, and arm swing  Able to rise from chair without using arms  ROS:    Review of Systems   Constitutional: Negative  Negative for appetite change and fever  HENT: Negative for hearing loss, tinnitus, trouble swallowing and voice change  Snoring     Eyes: Negative  Negative for photophobia and pain  Respiratory: Negative  Negative for shortness of breath  Cardiovascular: Negative  Negative for palpitations  Gastrointestinal: Negative  Negative for nausea and vomiting  Endocrine: Negative  Negative for cold intolerance and heat intolerance  Genitourinary: Negative  Negative for dysuria, frequency and urgency  Musculoskeletal: Negative  Negative for myalgias and neck pain  Skin: Negative    Negative for rash  Allergic/Immunologic: Negative  Neurological: Positive for tremors  Negative for dizziness, seizures, syncope, facial asymmetry, speech difficulty, weakness, light-headedness, numbness and headaches  Hematological: Negative  Does not bruise/bleed easily  Psychiatric/Behavioral: Negative  Negative for confusion, hallucinations and sleep disturbance  All other systems reviewed and are negative  Review of system was personally reviewed

## 2019-07-17 NOTE — ASSESSMENT & PLAN NOTE
Progression of tremors more so on the left which is most bothersome when holding objects  He has adapted to his tremor and does not wish to consider any adjustments in medication  He manages well  We discussed other options such as a weighted glove and stabilizing utensils, but he is not interested as he feels he is doing well  We will continue primidone 150mg bid

## 2019-09-13 DIAGNOSIS — E11.319 TYPE 2 DIABETES MELLITUS WITH RETINOPATHY OF BOTH EYES, WITHOUT LONG-TERM CURRENT USE OF INSULIN, MACULAR EDEMA PRESENCE UNSPECIFIED, UNSPECIFIED RETINOPATHY SEVERITY (HCC): ICD-10-CM

## 2019-09-17 ENCOUNTER — OFFICE VISIT (OUTPATIENT)
Dept: SLEEP CENTER | Facility: CLINIC | Age: 74
End: 2019-09-17
Payer: MEDICARE

## 2019-09-17 VITALS
DIASTOLIC BLOOD PRESSURE: 72 MMHG | WEIGHT: 219.8 LBS | HEIGHT: 67 IN | SYSTOLIC BLOOD PRESSURE: 132 MMHG | BODY MASS INDEX: 34.5 KG/M2 | HEART RATE: 84 BPM

## 2019-09-17 DIAGNOSIS — G47.33 OBSTRUCTIVE SLEEP APNEA TREATED WITH CONTINUOUS POSITIVE AIRWAY PRESSURE (CPAP): Primary | ICD-10-CM

## 2019-09-17 DIAGNOSIS — Z99.89 OBSTRUCTIVE SLEEP APNEA TREATED WITH CONTINUOUS POSITIVE AIRWAY PRESSURE (CPAP): Primary | ICD-10-CM

## 2019-09-17 PROCEDURE — 99214 OFFICE O/P EST MOD 30 MIN: CPT | Performed by: NURSE PRACTITIONER

## 2019-09-17 NOTE — PROGRESS NOTES
Progress Note - [facility] [address] 72 y o  male   :1945, MRN: [id number]  2019          Follow Up Evaluation / Problem:     Severe Obstructive Sleep Apnea  Obesity      Thank you for the opportunity of participating in the evaluation and care of this patient in the Sleep Clinic at [facility]  HPI: Gail Sanchez is a 72y o  year old male  The patient presents for follow up of severe obstructive sleep apnea  He was diagnosed with sleep apnea over 15 years ago and used CPAP since diagnosis  He completed a home sleep study in order to qualify for new CPAP equipment under his medicare insurance when his prior machine stopped working  He has used the new equipment since  and is here to review compliance and effectiveness of treatment       Review of Systems      Genitourinary none   Cardiology none   Gastrointestinal none   Neurology none   Constitutional none   Integumentary none   Psychiatry none   Musculoskeletal none   Pulmonary none   ENT none   Endocrine none   Hematological none       Current Outpatient Medications:     metFORMIN (GLUCOPHAGE) 1000 MG tablet, Take 1 tablet (1,000 mg total) by mouth 2 (two) times a day, Disp: 180 tablet, Rfl: 3    metoprolol succinate (TOPROL-XL) 50 mg 24 hr tablet, take 2 tablets by mouth once daily, Disp: 180 tablet, Rfl: 1    primidone (MYSOLINE) 50 mg tablet, Take 3 tablets (150 mg total) by mouth 2 (two) times a day, Disp: 540 tablet, Rfl: 3    amLODIPine (NORVASC) 5 mg tablet, Take 5 mg by mouth daily, Disp: , Rfl:     benazepril (LOTENSIN) 20 mg tablet, Take 1 tablet by mouth daily, Disp: , Rfl:     cholecalciferol (VITAMIN D3) 1,000 units tablet, Take 2,000 Units by mouth 2 (two) times a day, Disp: , Rfl:     cyanocobalamin (VITAMIN B-12) 100 mcg tablet, Take 1 tablet by mouth daily, Disp: , Rfl:     Multiple Vitamin (MULTI-DAY VITAMINS PO), Take 1 tablet by mouth daily, Disp: , Rfl:     NON FORMULARY, GlucoBurn, Disp: , Rfl:     Omega-3 Fatty Acids (FISH OIL) 1200 MG CAPS, Take 1 capsule by mouth daily, Disp: , Rfl:     [place] Sleepiness Scale  Sitting and reading: Moderate chance of dozing  Watching TV: High chance of dozing  Sitting, inactive in a public place (e g  a theatre or a meeting): Would never doze  As a passenger in a car for an hour without a break: Would never doze  Lying down to rest in the afternoon when circumstances permit: Slight chance of dozing  Sitting and talking to someone: Would never doze  Sitting quietly after a lunch without alcohol: Would never doze  In a car, while stopped for a few minutes in traffic: Would never doze  Total score: 6              Vitals:    09/17/19 1000   BP: 132/72   Pulse: 84   Weight: 99 7 kg (219 lb 12 8 oz)   Height: 5' 7" (1 702 m)       Body mass index is 34 43 kg/m²  Neck Circumference: 19 5       [place]WORTH SLEEPINESS SCORE  Total score: 6      Past History Since Last Sleep Center Visit:   He denies any changes to his health since his last visit  He reports sleeping very well when using the CPAP equipment  He awakens feeling alert and ready to start his day  He initially was fit with a full face mask, but did not feel comfortable using it  He is currently using a nasal mask, which he likes and notices a good fit on the machine readings in the morning  He reports that his recent AHI readings have been in the single digits  He does not use the water chamber on the machine and finds the air and pressure comfortable  He admits that at times, he falls asleep watching TV downstairs on his sofa, decreasing his night time usage  The patient reports that he cleans the equipment appropriately and changes supplies on a regular basis      The review of systems and following portions of the patient's history were reviewed and updated as appropriate: allergies, current medications, past family history, past medical history, past social history, past surgical history, and problem list         OBJECTIVE    PAP Pressure: Nasal AutoPAP using a lower limit of 4 cm and an upper limit of 20 cm of water pressure  DME Provider: [facility]    Physical Exam:     General Appearance:   Alert, cooperative, no distress, appears stated age, obese     Head:   Normocephalic, without obvious abnormality, atraumatic     Eyes:   PERRL, conjunctiva/corneas clear, EOM's intact          Nose:  Nares normal, septum midline, no drainage or sinus tenderness           Throat:  Lips, teeth and gums normal; tongue normal size and  shape and midline mucosa moist with low-lying soft palatal tissue noted, uvula thick and barely visible, tonsils not visualized, Mallampati class 4       Neck:  Supple, symmetrical, trachea midline, no adenopathy; Thyroid: No enlargement, tenderness or nodules; no carotid bruit or JVD     Lungs:      Expiratory wheeze noted bilaterally, respirations unlabored     Heart:   Regular rate and rhythm, S1 and S2 normal, no murmur, rub or gallop       Extremities:  Extremities normal, atraumatic, no cyanosis or edema       Skin:  Skin color, texture, turgor normal, no rashes or lesions       Neurologic:  Tremor noted in hands, left >right  Normal strength, sensation throughout       ASSESSMENT / PLAN    1  Obstructive sleep apnea treated with continuous positive airway pressure (CPAP)  PAP DME Resupply/Reorder    PAP DME Pressure Change           Counseling / Coordination of Care  Total clinic time spent today 30 minutes  Greater than 50% of total time was spent with the patient and / or family counseling and / or coordination of care       A description of the counseling / coordination of care:     Impressions, Diagnostic results, Prognosis, Instructions for management, Risks and benefits of treatment, Patient and family education, Risk factor reductions and Importance of compliance with treatment    Today I reviewed the patient's compliance data  he has been able to use the equipment 98 7% of all days recorded  Average usage was 4 or more hours 79 5% of all days recorded  The estimated AHI is 16 3 abnormal breathing events per hour  Response to treatment has been very good  A pressure change has been ordered to change pressure range to 7cm to 20cm  Supplies have been ordered for the next year  We discussed the use of the Dream  bandar  He plans to install the bandar to review data he currently views on his machine every morning  He will continue using this equipment at the settings noted above for the next 12 months  At that timehe will then return for a routine follow-up evaluation  I have asked the patient to contact the Sleep [address] [place] [address] if he encounters any difficulties prior to that time  The following instructions have been given to the patient today:    Patient Instructions   1  Continue use of CPAP equipment nightly  2  Continue to clean your equipment, as discussed  3  Contact the Sleep [facility] [place] [address] with any questions or concerns prior to your next visit, as needed  4  Schedule visit for follow-up in 1 year    Consider downloading the Dream  bandar to view your usage, mask fit and AHI, with a goal of AHI less than 5-10  You can find it on the bandar store, called [employer] [address]  The bandar is free and uses Bluetooth to connect  You need your apple ID and the serial number from the CPAP to start the bandar  Nursing Support:  When: Monday through Friday 7A-5PM except holidays  Where: Our direct line is [phone]  If you are having a true emergency please call 911  In the event that the line is busy or it is after hours please leave a voice message and we will return your call  Please speak clearly, leaving your full name, birth date, best number to reach you and the reason for your call  Medication refills:  We will need the name of the medication, the dosage, the ordering provider, whether you get a 30 or 90 day refill, and the pharmacy name and address  Medications will be ordered by the provider only  Nurses cannot call in prescriptions  Please allow 7 days for medication refills  Physician requested updates: If your provider requested that you call with an update after starting medication, please be ready to provide us the medication and dosage, what time you take your medication, the time you attempt to fall asleep, time you fall asleep, when you wake up, and what time you get out of bed  Sleep Study Results: We will contact you with sleep study results and/or next steps after the physician has reviewed your testing        Kermit Arroyo, [facility]

## 2019-09-17 NOTE — PATIENT INSTRUCTIONS
1   Continue use of CPAP equipment nightly  2  Continue to clean your equipment, as discussed  3  Contact the Sleep [address] [place] [address] with any questions or concerns prior to your next visit, as needed  4  Schedule visit for follow-up in 1 year    Consider downloading the Dream  bandar to view your usage, mask fit and AHI, with a goal of AHI less than 5-10  You can find it on the bandar store, called [employer] [address]  The bandar is free and uses Bluetooth to connect  You need your apple ID and the serial number from the CPAP to start the bandar  Nursing Support:  When: Monday through Friday 7A-5PM except holidays  Where: Our direct line is [phone]  If you are having a true emergency please call 911  In the event that the line is busy or it is after hours please leave a voice message and we will return your call  Please speak clearly, leaving your full name, birth date, best number to reach you and the reason for your call  Medication refills: We will need the name of the medication, the dosage, the ordering provider, whether you get a 30 or 90 day refill, and the pharmacy name and address  Medications will be ordered by the provider only  Nurses cannot call in prescriptions  Please allow 7 days for medication refills  Physician requested updates: If your provider requested that you call with an update after starting medication, please be ready to provide us the medication and dosage, what time you take your medication, the time you attempt to fall asleep, time you fall asleep, when you wake up, and what time you get out of bed  Sleep Study Results: We will contact you with sleep study results and/or next steps after the physician has reviewed your testing

## 2019-09-20 DIAGNOSIS — G25.0 TREMOR, ESSENTIAL: ICD-10-CM

## 2019-09-23 ENCOUNTER — OFFICE VISIT (OUTPATIENT)
Dept: FAMILY MEDICINE CLINIC | Facility: CLINIC | Age: 74
End: 2019-09-23
Payer: MEDICARE

## 2019-09-23 VITALS
RESPIRATION RATE: 18 BRPM | DIASTOLIC BLOOD PRESSURE: 80 MMHG | BODY MASS INDEX: 34.53 KG/M2 | OXYGEN SATURATION: 97 % | SYSTOLIC BLOOD PRESSURE: 150 MMHG | WEIGHT: 220 LBS | HEART RATE: 92 BPM | HEIGHT: 67 IN

## 2019-09-23 DIAGNOSIS — I10 ESSENTIAL HYPERTENSION: ICD-10-CM

## 2019-09-23 DIAGNOSIS — E53.8 VITAMIN B12 DEFICIENCY: ICD-10-CM

## 2019-09-23 DIAGNOSIS — E55.9 VITAMIN D DEFICIENCY: ICD-10-CM

## 2019-09-23 DIAGNOSIS — E11.319 TYPE 2 DIABETES MELLITUS WITH RETINOPATHY OF BOTH EYES, WITHOUT LONG-TERM CURRENT USE OF INSULIN, MACULAR EDEMA PRESENCE UNSPECIFIED, UNSPECIFIED RETINOPATHY SEVERITY (HCC): ICD-10-CM

## 2019-09-23 DIAGNOSIS — E66.9 OBESITY, CLASS I, BMI 30-34.9: ICD-10-CM

## 2019-09-23 DIAGNOSIS — Z00.00 MEDICARE ANNUAL WELLNESS VISIT, SUBSEQUENT: Primary | ICD-10-CM

## 2019-09-23 DIAGNOSIS — G47.33 OBSTRUCTIVE SLEEP APNEA SYNDROME: ICD-10-CM

## 2019-09-23 PROBLEM — E11.39 TYPE 2 DIABETES MELLITUS WITH OPHTHALMIC COMPLICATION, WITHOUT LONG-TERM CURRENT USE OF INSULIN (HCC): Status: ACTIVE | Noted: 2019-09-23

## 2019-09-23 PROCEDURE — 99214 OFFICE O/P EST MOD 30 MIN: CPT | Performed by: FAMILY MEDICINE

## 2019-09-23 PROCEDURE — G0439 PPPS, SUBSEQ VISIT: HCPCS | Performed by: FAMILY MEDICINE

## 2019-09-23 RX ORDER — PRIMIDONE 50 MG/1
150 TABLET ORAL 2 TIMES DAILY
Qty: 540 TABLET | Refills: 3 | Status: SHIPPED | OUTPATIENT
Start: 2019-09-23 | End: 2020-06-23

## 2019-09-23 RX ORDER — BENAZEPRIL HYDROCHLORIDE 10 MG/1
10 TABLET ORAL DAILY
Qty: 90 TABLET | Refills: 3 | Status: SHIPPED | OUTPATIENT
Start: 2019-09-23 | End: 2020-06-23 | Stop reason: SDUPTHER

## 2019-09-23 NOTE — ASSESSMENT & PLAN NOTE
Lab Results   Component Value Date    HGBA1C 9 9 (H) 03/11/2019      diabetic control has been relatively poor but he did improve on his last A1c  Unfortunately, they are unable to get blood work done on him a few days ago  I am giving him a new slip to get blood work done for an A1c as well as CMP and urine microalbumin  He declines a fingerstick A1c today

## 2019-09-23 NOTE — PROGRESS NOTES
Assessment and Plan:     Problem List Items Addressed This Visit        Endocrine    Background diabetic retinopathy ([address] )     Lab Results   Component Value Date    HGBA1C 9 9 (H) 03/11/2019    With Ophthalmology           Relevant Medications    benazepril (LOTENSIN) 10 mg tablet       Respiratory    Sleep apnea     Continue on CPAP            Cardiovascular and Mediastinum    Essential hypertension     Initiate benazepril 10 mg daily  Check blood work in 2-3 weeks  Relevant Medications    benazepril (LOTENSIN) 10 mg tablet    Other Relevant Orders    Comprehensive metabolic panel    Hemoglobin A1C       Other    Vitamin B12 deficiency    Relevant Orders    Vitamin B12    Vitamin D deficiency    Relevant Orders    Vitamin D 25 hydroxy      Other Visit Diagnoses     Medicare annual wellness visit, subsequent    -  Primary    Obesity, Class I, BMI 30-34 9              Tobacco Cessation Counseling: Tobacco cessation counseling was provided  The patient is sincerely urged to quit consumption of tobacco  He is not ready to quit tobacco  Medication options discussed  Patient refused medication  Preventive health issues were discussed with patient, and age appropriate screening tests were ordered as noted in patient's After Visit Summary  Personalized health advice and appropriate referrals for health education or preventive services given if needed, as noted in patient's After Visit Summary       History of Present Illness:     Patient presents for Medicare Annual Wellness visit    Patient Care Team:  Mohit Bose MD as PCP - General     Problem List:     Patient Active Problem List   Diagnosis    Benign familial tremor    [facility] retin[facility] )    Diabetic retinopathy [facility] )    Eczema    Essential hypertension    Impotence of organic origin    Psoriasis    Sleep apnea    Vitamin B12 deficiency    Vitamin D deficiency    Type 2 diabetes mellitus with ophthalmic complication, without long-term current use of insulin (HCC)      Past Medical and Surgical History:     Past Medical History:   Diagnosis Date    Hypercholesterolemia     Intermittent claudication ([address] )     Memory loss     last assessed 14    Mild cognitive impairment     last assessed 16    Vitamin D deficiency      Past Surgical History:   Procedure Laterality Date    TONSILLECTOMY AND ADENOIDECTOMY        Family History:     Family History   Problem Relation Age of Onset    Melanoma Father         skin      Social History:     Social History     Socioeconomic History    Marital status: /Civil Union     Spouse name: None    Number of children: None    Years of education: None    Highest education level: None   Occupational History    None   Social Needs    Financial resource strain: None    Food insecurity:     Worry: None     Inability: None    Transportation needs:     Medical: None     Non-medical: None   Tobacco Use    Smoking status: Current Some Day Smoker     Types: Cigars     Last attempt to quit: 2007     Years since quittin 2    Smokeless tobacco: Former User   Substance and Sexual Activity    Alcohol use: Not Currently     Comment: social; rare    Drug use: Never    Sexual activity: None   Lifestyle    Physical activity:     Days per week: None     Minutes per session: None    Stress: None   Relationships    Social connections:     Talks on phone: None     Gets together: None     Attends Jewish service: None     Active member of club or organization: None     Attends meetings of clubs or organizations: None     Relationship status: None    Intimate partner violence:     Fear of current or ex partner: None     Emotionally abused: None     Physically abused: None     Forced sexual activity: None   Other Topics Concern    None   Social History Narrative    None       Medications and Allergies:     Current Outpatient Medications   Medication Sig Dispense Refill    metFORMIN (GLUCOPHAGE) 1000 MG tablet Take 1 tablet (1,000 mg total) by mouth 2 (two) times a day 180 tablet 3    metoprolol succinate (TOPROL-XL) 50 mg 24 hr tablet take 2 tablets by mouth once daily 180 tablet 1    NON FORMULARY GlucoBurn      primidone (MYSOLINE) 50 mg tablet Take 3 tablets (150 mg total) by mouth 2 (two) times a day 540 tablet 3    benazepril (LOTENSIN) 10 mg tablet Take 1 tablet (10 mg total) by mouth daily 90 tablet 3     No current facility-administered medications for this visit  Allergies   Allergen Reactions    Cefadroxil Headache    Pioglitazone     Sulfa Antibiotics     Victoza [Liraglutide] Rash      Immunizations: There is no immunization history for the selected administration types on file for this patient  Health Maintenance:         Topic Date Due    Hepatitis C Screening  1945    CRC Screening: Colonoscopy  1945         Topic Date Due    HEPATITIS B VACCINES (1 of 3 - Risk 3-dose series) 11/26/1966    Pneumococcal Vaccine: 65+ Years (1 of 2 - PCV13) 11/26/2012      Medicare Health Risk Assessment:     /80   Pulse 92   Resp 18   Ht 5' 7" (1 702 m)   Wt 99 8 kg (220 lb)   SpO2 97%   BMI 34 46 kg/m²      Uzma Melendez is here for his Subsequent Wellness visit  Health Risk Assessment:   Patient rates overall health as good  Patient feels that their physical health rating is slightly better  Eyesight was rated as same  Hearing was rated as same  Patient feels that their emotional and mental health rating is same  Pain experienced in the last 7 days has been none  Depression Screening:   PHQ-2 Score: 0  PHQ-9 Score: 0      Fall Risk Screening: In the past year, patient has experienced: no history of falling in past year      Home Safety:  Patient does not have trouble with stairs inside or outside of their home  Patient has working smoke alarms and has no working carbon monoxide detector   Home safety hazards include: none      Nutrition:   Current diet is Regular and Diabetic  Medications:   Patient is currently taking over-the-counter supplements  OTC medications include: see medication list      Activities of Daily Living (ADLs)/Instrumental Activities of Daily Living (IADLs):   Walk and transfer into and out of bed and chair?: Yes  Dress and groom yourself?: Yes    Bathe or shower yourself?: Yes    Feed yourself? Yes  Do your laundry/housekeeping?: Yes  Manage your money, pay your bills and track your expenses?: Yes  Make your own meals?: Yes    Do your own shopping?: Yes    Previous Hospitalizations:   Any hospitalizations or ED visits within the last 12 months?: No      Advance Care Planning:   Living will: Yes    Durable POA for healthcare: Yes    Advanced directive: Yes      Cognitive Screening:   Provider or family/friend/caregiver concerned regarding cognition?: No    PREVENTIVE SCREENINGS      Cardiovascular Screening:    General: Screening Current      Diabetes Screening:     General: Screening Not Indicated and History Diabetes      Prostate Cancer Screening:    General: Screening Current      Abdominal Aortic Aneurysm (AAA) Screening:    Risk factors include: age between 73-69 yo and tobacco use        Lung Cancer Screening:     General: Screening Not Indicated      Hepatitis C Screening:    General: Screening Current      Preventive Screening Comments: The patient declines vaccines  He also declines colon cancer screening  We will continue to discuss this at future visits  He remains quite active  He denies any problems with depression or cognitive decline  He did lose his wife back in December but 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

## 2019-09-23 NOTE — PROGRESS NOTES
Assessment/Plan:       Problem List Items Addressed This Visit        Endocrine    Background diabetic retinopathy ([facility] )     Lab Results   Component Value Date    HGBA1C 9 9 (H) 03/11/2019    With Ophthalmology           Relevant Medications    benazepril (LOTENSIN) 10 mg tablet       Respiratory    Sleep apnea     Continue on CPAP            Cardiovascular and Mediastinum    Essential hypertension     Initiate benazepril 10 mg daily  Check blood work in 2-3 weeks  Relevant Medications    benazepril (LOTENSIN) 10 mg tablet    Other Relevant Orders    Comprehensive metabolic panel    Hemoglobin A1C       Other    Vitamin B12 deficiency    Relevant Orders    Vitamin B12    Vitamin D deficiency    Relevant Orders    Vitamin D 25 hydroxy      Other Visit Diagnoses     Medicare annual wellness visit, subsequent    -  Primary    Obesity, Class I, BMI 30-34 9                Subjective:      Patient ID: Srinath Iraheta is a 72 y o  male  HPI   Patient presents today for follow-up for type 2 diabetes with retinopathy  He has been compliant with his metformin  Last A1c was a bit elevated  He has been losing some weight  Unfortunately, they were unable to get blood work from him the other day  He declines A1c today  His vision remains quite stable  He is having no polyuria or polydipsia  His history of hypertension  Blood pressure is a bit elevated today  He does remain compliant with metoprolol  He has been quite active and denies any chest pain or shortness of breath  He has a history of an essential tremor and remains on Mysoline at the direction of Neurology  He feels the tremor has been stable    The following portions of the patient's history were reviewed and updated as appropriate: allergies, current medications, past family history, past medical history, past social history, past surgical history and problem list       Current Outpatient Medications:     metFORMIN (GLUCOPHAGE) 1000 MG tablet, Take 1 tablet (1,000 mg total) by mouth 2 (two) times a day, Disp: 180 tablet, Rfl: 3    metoprolol succinate (TOPROL-XL) 50 mg 24 hr tablet, take 2 tablets by mouth once daily, Disp: 180 tablet, Rfl: 1    NON FORMULARY, GlucoBurn, Disp: , Rfl:     primidone (MYSOLINE) 50 mg tablet, Take 3 tablets (150 mg total) by mouth 2 (two) times a day, Disp: 540 tablet, Rfl: 3    benazepril (LOTENSIN) 10 mg tablet, Take 1 tablet (10 mg total) by mouth daily, Disp: 90 tablet, Rfl: 3     Review of Systems   Constitutional: Negative for appetite change, chills, fatigue, fever and unexpected weight change  HENT: Negative for trouble swallowing  Eyes: Negative for visual disturbance  Respiratory: Negative for cough, chest tightness, shortness of breath and wheezing  Cardiovascular: Negative for chest pain  Gastrointestinal: Negative for abdominal distention, abdominal pain, blood in stool, constipation and diarrhea  Endocrine: Negative for polyuria  Genitourinary: Negative for difficulty urinating and flank pain  Musculoskeletal: Negative for arthralgias and myalgias  Skin: Negative for rash  Neurological: Negative for dizziness and light-headedness  Hematological: Negative for adenopathy  Does not bruise/bleed easily  Psychiatric/Behavioral: Negative for sleep disturbance  Objective:      /80   Pulse 92   Resp 18   Ht 5' 7" (1 702 m)   Wt 99 8 kg (220 lb)   SpO2 97%   BMI 34 46 kg/m²          Physical Exam   Constitutional: He is oriented to person, place, and time  He appears well-developed and well-nourished  No distress  HENT:   Head: Normocephalic  Eyes: Pupils are equal, round, and reactive to light  Right eye exhibits no discharge  Left eye exhibits no discharge  Neck: No tracheal deviation present  No thyromegaly present  Cardiovascular: Normal rate, regular rhythm and normal heart sounds  Pulses are weak pulses  No murmur heard    Pulses:       Dorsalis pedis pulses are 1+ on the right side, and 1+ on the left side  Posterior tibial pulses are 1+ on the right side, and 1+ on the left side  Pulmonary/Chest: Effort normal  No respiratory distress  He has no wheezes  He has no rales  Abdominal: Soft  He exhibits no distension  There is no tenderness  Musculoskeletal: Normal range of motion  He exhibits no edema  Feet:    Feet:   Right Foot:   Skin Integrity: Positive for dry skin  Left Foot:   Skin Integrity: Positive for dry skin  Lymphadenopathy:     He has no cervical adenopathy  Neurological: He is alert and oriented to person, place, and time  No cranial nerve deficit  Skin: Skin is warm  He is not diaphoretic  No erythema  Psychiatric: He has a normal mood and affect  Judgment and thought content normal          Eyal Leo MD  Diabetic Foot Exam    Patient's shoes and socks removed  Right Foot/Ankle   Right Foot Inspection  Skin Exam: dry skin skin not intact                          Toe Exam: ROM and strength within normal limits  Sensory   Vibration: intact  Proprioception: intact   Monofilament testing: intact  Vascular    The right DP pulse is 1+  The right PT pulse is 1+  Left Foot/Ankle  Left Foot Inspection  Skin Exam: dry skinskin not intact                         Toe Exam: ROM and strength within normal limits                   Sensory       Monofilament: diminished  Vascular    The left DP pulse is 1+  The left PT pulse is 1+  Assign Risk Category:  No deformity present; Loss of protective sensation; Weak pulses       Risk: 1    BMI Counseling: Body mass index is 34 46 kg/m²  The BMI is above normal  Nutrition recommendations include reducing portion sizes, decreasing overall calorie intake and 3-5 servings of fruits/vegetables daily  Exercise recommendations include moderate aerobic physical activity for 150 minutes/week

## 2019-10-16 ENCOUNTER — TELEPHONE (OUTPATIENT)
Dept: SLEEP CENTER | Facility: CLINIC | Age: 74
End: 2019-10-16

## 2019-12-13 ENCOUNTER — TELEPHONE (OUTPATIENT)
Dept: CARDIOLOGY CLINIC | Facility: CLINIC | Age: 74
End: 2019-12-13

## 2019-12-13 NOTE — TELEPHONE ENCOUNTER
Fax received from [address] [place] [address] to refill Metoprolol Succ ER 50mg, 2 Tab by mouth once daily  90 day supply    Patient has not seen Dr Janusz Greenwood since 2/2/2018    Phone call to patient to sched overdue appt in order to continue to refill scripts  Appt sched with Dr Janusz Greenwood, next available, 12/17/19 in the [place] office      Patient states he has enough medication to hold him until the office visit 12/17/19

## 2019-12-17 ENCOUNTER — OFFICE VISIT (OUTPATIENT)
Dept: CARDIOLOGY CLINIC | Facility: CLINIC | Age: 74
End: 2019-12-17
Payer: MEDICARE

## 2019-12-17 VITALS
SYSTOLIC BLOOD PRESSURE: 120 MMHG | WEIGHT: 220 LBS | HEIGHT: 67 IN | HEART RATE: 74 BPM | DIASTOLIC BLOOD PRESSURE: 70 MMHG | BODY MASS INDEX: 34.53 KG/M2

## 2019-12-17 DIAGNOSIS — R00.2 PALPITATIONS: ICD-10-CM

## 2019-12-17 DIAGNOSIS — I10 ESSENTIAL HYPERTENSION: Primary | ICD-10-CM

## 2019-12-17 PROCEDURE — 99213 OFFICE O/P EST LOW 20 MIN: CPT | Performed by: INTERNAL MEDICINE

## 2019-12-17 RX ORDER — METOPROLOL SUCCINATE 50 MG/1
100 TABLET, EXTENDED RELEASE ORAL DAILY
Qty: 180 TABLET | Refills: 1 | Status: CANCELLED | OUTPATIENT
Start: 2019-12-17

## 2019-12-17 RX ORDER — METOPROLOL SUCCINATE 50 MG/1
50 TABLET, EXTENDED RELEASE ORAL DAILY
Qty: 90 TABLET | Refills: 3 | Status: SHIPPED | OUTPATIENT
Start: 2019-12-17 | End: 2020-06-23 | Stop reason: SDUPTHER

## 2019-12-17 RX ORDER — METOPROLOL SUCCINATE 50 MG/1
50 TABLET, EXTENDED RELEASE ORAL DAILY
Qty: 90 TABLET | Refills: 3 | Status: SHIPPED | OUTPATIENT
Start: 2019-12-17 | End: 2019-12-17 | Stop reason: SDUPTHER

## 2019-12-17 NOTE — PROGRESS NOTES
EPS Progress Note - Ezequiel Larsen 69 y o  male MRN: [id number]           ASSESSMENT:  1  Essential hypertension     2  Palpitations  metoprolol succinate (TOPROL-XL) 50 mg 24 hr tablet    DISCONTINUED: metoprolol succinate (TOPROL-XL) 50 mg 24 hr tablet           PLAN:  1  He is completely asymptomatic he has a history of atrial tachycardia he has been taking Toprol XL 50 mg daily which is continue current dose this point time I am reluctant to increase it as he feels well    2  Hypertension well controlled      HPI:   Ezequiel Larsen is a 69 y o  male  Was seen for an atrial tachycardia it was recommended that he increase his Toprol-XL from 50 mg daily to 100 mg daily  This did not occur he follows up today for refill of his Toprol he states he has no chest pain no shortness of breath no palpitations no lightheadedness or dizziness he has been taking the Toprol XL 50 mg once daily        ROS:   Negative for palpitations, shortness of breath, chest discomfort, presyncope or syncope, lower extremity swelling  All other 12 point ROS negative     Objective:     Vitals: Blood pressure 120/70, pulse 74, height 5' 7" (1 702 m), weight 99 8 kg (220 lb)  , Body mass index is 34 46 kg/m²  ,        Physical Exam:    GEN: Ezequiel Larsen appears well, alert and oriented x 3, pleasant and cooperative   HEENT: pupils equal, round, and reactive to light; extraocular muscles intact  NECK: supple, no carotid bruits   HEART: regular rhythm, normal S1 and S2, no murmurs, clicks, gallops or rubs   LUNGS: clear to auscultation bilaterally; no wheezes, rales, or rhonchi   ABDOMEN: normal bowel sounds, soft, no tenderness, no distention  EXTREMITIES: peripheral pulses normal; no clubbing, cyanosis, or edema  NEURO: no focal findings   SKIN: normal without suspicious lesions on exposed skin    Medications:      Current Outpatient Medications:     benazepril (LOTENSIN) 10 mg tablet, Take 1 tablet (10 mg total) by mouth daily, Disp: 90 tablet, Rfl: 3    metFORMIN (GLUCOPHAGE) 1000 MG tablet, Take 1 tablet (1,000 mg total) by mouth 2 (two) times a day, Disp: 180 tablet, Rfl: 3    metoprolol succinate (TOPROL-XL) 50 mg 24 hr tablet, Take 1 tablet (50 mg total) by mouth daily, Disp: 90 tablet, Rfl: 3    NON FORMULARY, GlucoBurn, Disp: , Rfl:     primidone (MYSOLINE) 50 mg tablet, Take 3 tablets (150 mg total) by mouth 2 (two) times a day, Disp: 540 tablet, Rfl: 3     Family History   Problem Relation Age of Onset    Melanoma Father         skin     Social History     Socioeconomic History    Marital status: /Civil Union     Spouse name: Not on file    Number of children: Not on file    Years of education: Not on file    Highest education level: Not on file   Occupational History    Not on file   Social Needs    Financial resource strain: Not on file    Food insecurity:     Worry: Not on file     Inability: Not on file    Transportation needs:     Medical: Not on file     Non-medical: Not on file   Tobacco Use    Smoking status: Current Every Day Smoker     Types: Cigars     Last attempt to quit: 2007     Years since quittin 4    Smokeless tobacco: Former User   Substance and Sexual Activity    Alcohol use: Not Currently     Comment: social; rare    Drug use: Never    Sexual activity: Not on file   Lifestyle    Physical activity:     Days per week: Not on file     Minutes per session: Not on file    Stress: Not on file   Relationships    Social connections:     Talks on phone: Not on file     Gets together: Not on file     Attends Sabianism service: Not on file     Active member of club or organization: Not on file     Attends meetings of clubs or organizations: Not on file     Relationship status: Not on file    Intimate partner violence:     Fear of current or ex partner: Not on file     Emotionally abused: Not on file     Physically abused: Not on file     Forced sexual activity: Not on file   Other Topics Concern    Not on file   Social History Narrative    Not on file     Social History     Tobacco Use   Smoking Status Current Every Day Smoker    Types: Cigars    Last attempt to quit: 2007    Years since quittin 4   Smokeless Tobacco Former User     Social History     Substance and Sexual Activity   Alcohol Use Not Currently    Comment: social; rare       Labs & Results:  Below is the patient's most recent value for Albumin, ALT, AST, BUN, Calcium, Chloride, Cholesterol, CO2, Creatinine, GFR, Glucose, HDL, Hematocrit, Hemoglobin, Hemoglobin A1C, LDL, Magnesium, Phosphorus, Platelets, Potassium, PSA, Sodium, Triglycerides, and WBC  Lab Results   Component Value Date    ALT 17 2019    AST 12 2019    BUN 15 2019    CALCIUM 8 3 2019     2019    CHOL 153 2014    CO2 28 2019    CREATININE 0 76 2019    HDL 35 (L) 2019    HCT 43 2 2019    HGB 13 8 2019    HGBA1C 9 9 (H) 2019     2019    K 4 0 2019    PSA 0 4 2019     2015    TRIG 86 2019    WBC 10 50 (H) 2019     Note: for a comprehensive list of the patient's lab results, access the Results Review activity  Cardiac testing:   Results for orders placed during the hospital encounter of 17   Echo complete with contrast if indicated    Narrative [address]  [place], [address]  [phone]    Transthoracic Echocardiogram  2D, M-mode, Doppler, and Color Doppler    Study date:  2017    Patient: Bea Bucio  MR number: [id number]  Account number: [de-identified]  : 1945  Age: 81 years  Gender: Male  Status: Outpatient  Location: [facility]  Height: 68 in  Weight: 229 lb  BP: 124/ 68 mmHg    Indications: cardiac arrhythmia   Palpitations    Diagnoses: I49 9 - Cardiac arrhythmia, unspecified    Sonographer:  Christie Boxer, RCS  Primary Physician:  Ev Klein Tom Marcelino MD  Referring Physician:  Jatin Crain MD  Group:  [facility]  Interpreting Physician:  Zayra Purcell MD    SUMMARY    LEFT VENTRICLE:  Systolic function was normal  Ejection fraction was estimated to be 60 %  Although no diagnostic regional wall motion abnormality was identified, this possibility cannot be completely excluded on the basis of this study  There was mild concentric hypertrophy  Doppler parameters were consistent with abnormal left ventricular relaxation (grade 1 diastolic dysfunction)  AORTA:  The root exhibited upper limit of normal size (4 cm at the Sinsuses of Valsalva - 3 6 cm above the Sinsuses)    HISTORY: PRIOR HISTORY: Risk factors: hypertension, diabetes, hypercholesterolemia, and a former history of cigarette use (quitting more than one month ago)  PROCEDURE: The study was performed in the [address] [place] [address]  This was a routine study  The transthoracic approach was used  The study included complete 2D imaging, M-mode, complete spectral Doppler, and color Doppler  The  heart rate was 87 bpm, at the start of the study  LEFT VENTRICLE: Size was normal  Systolic function was normal  Ejection fraction was estimated to be 60 %  Although no diagnostic regional wall motion abnormality was identified, this possibility cannot be completely excluded on the basis  of this study  There was mild concentric hypertrophy  DOPPLER: Doppler parameters were consistent with abnormal left ventricular relaxation (grade 1 diastolic dysfunction)  RIGHT VENTRICLE: The size was normal  Systolic function was normal     LEFT ATRIUM: Size was normal     RIGHT ATRIUM: Size was normal     MITRAL VALVE: Valve structure was normal  DOPPLER: There was no evidence for stenosis  There was no regurgitation  AORTIC VALVE: The valve was trileaflet  Leaflets exhibited mild calcification  DOPPLER: There was no evidence for stenosis   There was no regurgitation  TRICUSPID VALVE: The valve structure was normal  DOPPLER: There was no regurgitation  The tricuspid jet envelope definition was inadequate for estimation of RV systolic pressure  There are no indirect findings (abnormal RV volume or  geometry, altered pulmonary flow velocity profile, or leftward septal displacement) which would suggest moderate or severe pulmonary hypertension  PULMONIC VALVE: Not well visualized  PERICARDIUM: There was no pericardial effusion  AORTA: The root exhibited upper limit of normal size (4 cm at the Sinsuses of Valsalva - 3 6 cm above the Sinsuses)    SYSTEMIC VEINS: IVC: The inferior vena cava was normal in size  Respirophasic changes were normal     SYSTEM MEASUREMENT TABLES    2D  %FS: 29 1 %  Ao Diam: 4 cm  EDV(Teich): 125 6 ml  EF(Teich): 55 5 %  ESV(Teich): 55 9 ml  IVSd: 1 1 cm  LA Area: 17 5 cm2  LA Diam: 3 5 cm  LVEDV MOD A4C: 109 1 ml  LVEF MOD A4C: 50 7 %  LVESV MOD A4C: 53 8 ml  LVIDd: 5 1 cm  LVIDs: 3 6 cm  LVLd A4C: 8 4 cm  LVLs A4C: 7 4 cm  LVPWd: 1 cm  RA Area: 14 7 cm2  RVIDd: 3 4 cm  SV MOD A4C: 55 3 ml  SV(Teich): 69 7 ml    MM  TAPSE: 2 5 cm    PW  AVC: 354 1 ms  E': 0 1 m/s  E/E': 9 7  MV A Hermann: 0 8 m/s  MV Dec Morrow: 4 3 m/s2  MV DecT: 147 8 ms  MV E Hermann: 0 6 m/s  MV E/A Ratio: 0 8  MV PHT: 42 9 ms  MVA By PHT: 5 1 cm2    [employer] Accredited Echocardiography [facility]    Prepared and electronically signed by    Zayra Purcell MD  Signed [id number] 12:18:57       No results found for this or any previous visit  No results found for this or any previous visit  No results found for this or any previous visit

## 2020-03-05 DIAGNOSIS — Z71.89 COMPLEX CARE COORDINATION: Primary | ICD-10-CM

## 2020-03-06 ENCOUNTER — PATIENT OUTREACH (OUTPATIENT)
Dept: FAMILY MEDICINE CLINIC | Facility: CLINIC | Age: 75
End: 2020-03-06

## 2020-03-18 ENCOUNTER — PATIENT OUTREACH (OUTPATIENT)
Dept: FAMILY MEDICINE CLINIC | Facility: CLINIC | Age: 75
End: 2020-03-18

## 2020-03-18 NOTE — PROGRESS NOTES
Bassem Araiza returned my call  He is currently taking metformin and glucoburn  He does not perform glucometer checks  Last A1C 9 9  He does go for routine eye exams but does not follow with podiatrist   He does not exercise routinely  Nutritional intake adequate, he states he is following a diabetic diet  He eats regularly during the day and is trying to eat healthy  Offered to send him diabetic diet information but he declined  He states he is managing well and declined further outreach

## 2020-04-14 ENCOUNTER — TELEPHONE (OUTPATIENT)
Dept: FAMILY MEDICINE CLINIC | Facility: CLINIC | Age: 75
End: 2020-04-14

## 2020-06-23 ENCOUNTER — APPOINTMENT (OUTPATIENT)
Dept: LAB | Facility: CLINIC | Age: 75
End: 2020-06-23
Payer: MEDICARE

## 2020-06-23 ENCOUNTER — OFFICE VISIT (OUTPATIENT)
Dept: FAMILY MEDICINE CLINIC | Facility: CLINIC | Age: 75
End: 2020-06-23
Payer: MEDICARE

## 2020-06-23 VITALS
HEART RATE: 92 BPM | BODY MASS INDEX: 30.66 KG/M2 | HEIGHT: 67 IN | WEIGHT: 195.38 LBS | TEMPERATURE: 98.1 F | DIASTOLIC BLOOD PRESSURE: 90 MMHG | OXYGEN SATURATION: 97 % | SYSTOLIC BLOOD PRESSURE: 176 MMHG

## 2020-06-23 DIAGNOSIS — E11.319 TYPE 2 DIABETES MELLITUS WITH RETINOPATHY OF BOTH EYES, WITHOUT LONG-TERM CURRENT USE OF INSULIN, MACULAR EDEMA PRESENCE UNSPECIFIED, UNSPECIFIED RETINOPATHY SEVERITY (HCC): Primary | ICD-10-CM

## 2020-06-23 DIAGNOSIS — I10 ESSENTIAL HYPERTENSION: ICD-10-CM

## 2020-06-23 DIAGNOSIS — F33.1 MODERATE EPISODE OF RECURRENT MAJOR DEPRESSIVE DISORDER (HCC): ICD-10-CM

## 2020-06-23 DIAGNOSIS — E55.9 VITAMIN D DEFICIENCY: ICD-10-CM

## 2020-06-23 DIAGNOSIS — E11.319 TYPE 2 DIABETES MELLITUS WITH RETINOPATHY OF BOTH EYES, WITHOUT LONG-TERM CURRENT USE OF INSULIN, MACULAR EDEMA PRESENCE UNSPECIFIED, UNSPECIFIED RETINOPATHY SEVERITY (HCC): ICD-10-CM

## 2020-06-23 DIAGNOSIS — G47.33 OBSTRUCTIVE SLEEP APNEA SYNDROME: ICD-10-CM

## 2020-06-23 DIAGNOSIS — E53.8 VITAMIN B12 DEFICIENCY: ICD-10-CM

## 2020-06-23 DIAGNOSIS — R00.2 PALPITATIONS: ICD-10-CM

## 2020-06-23 DIAGNOSIS — E11.319 DIABETIC RETINOPATHY OF BOTH EYES ASSOCIATED WITH TYPE 2 DIABETES MELLITUS, MACULAR EDEMA PRESENCE UNSPECIFIED, UNSPECIFIED RETINOPATHY SEVERITY (HCC): ICD-10-CM

## 2020-06-23 DIAGNOSIS — G25.0 TREMOR, ESSENTIAL: ICD-10-CM

## 2020-06-23 DIAGNOSIS — G25.0 BENIGN FAMILIAL TREMOR: ICD-10-CM

## 2020-06-23 DIAGNOSIS — E55.9 VITAMIN D DEFICIENCY: Primary | ICD-10-CM

## 2020-06-23 LAB
25(OH)D3 SERPL-MCNC: 18.8 NG/ML (ref 30–100)
ALBUMIN SERPL BCP-MCNC: 3.6 G/DL (ref 3.5–5)
ALP SERPL-CCNC: 98 U/L (ref 46–116)
ALT SERPL W P-5'-P-CCNC: 23 U/L (ref 12–78)
ANION GAP SERPL CALCULATED.3IONS-SCNC: 5 MMOL/L (ref 4–13)
AST SERPL W P-5'-P-CCNC: 11 U/L (ref 5–45)
BILIRUB SERPL-MCNC: 0.49 MG/DL (ref 0.2–1)
BUN SERPL-MCNC: 13 MG/DL (ref 5–25)
CALCIUM SERPL-MCNC: 9.5 MG/DL (ref 8.3–10.1)
CHLORIDE SERPL-SCNC: 101 MMOL/L (ref 100–108)
CO2 SERPL-SCNC: 28 MMOL/L (ref 21–32)
CREAT SERPL-MCNC: 0.8 MG/DL (ref 0.6–1.3)
CREAT UR-MCNC: 47.1 MG/DL
GFR SERPL CREATININE-BSD FRML MDRD: 89 ML/MIN/1.73SQ M
GLUCOSE P FAST SERPL-MCNC: 337 MG/DL (ref 65–99)
MICROALBUMIN UR-MCNC: 63.6 MG/L (ref 0–20)
MICROALBUMIN/CREAT 24H UR: 135 MG/G CREATININE (ref 0–30)
POTASSIUM SERPL-SCNC: 4 MMOL/L (ref 3.5–5.3)
PROT SERPL-MCNC: 8 G/DL (ref 6.4–8.2)
SL AMB POCT HEMOGLOBIN AIC: 10.2 (ref ?–6.5)
SODIUM SERPL-SCNC: 134 MMOL/L (ref 136–145)
VIT B12 SERPL-MCNC: 82 PG/ML (ref 100–900)

## 2020-06-23 PROCEDURE — 3060F POS MICROALBUMINURIA REV: CPT | Performed by: PHYSICIAN ASSISTANT

## 2020-06-23 PROCEDURE — 82043 UR ALBUMIN QUANTITATIVE: CPT | Performed by: FAMILY MEDICINE

## 2020-06-23 PROCEDURE — 82570 ASSAY OF URINE CREATININE: CPT | Performed by: FAMILY MEDICINE

## 2020-06-23 PROCEDURE — 36415 COLL VENOUS BLD VENIPUNCTURE: CPT

## 2020-06-23 PROCEDURE — 80053 COMPREHEN METABOLIC PANEL: CPT

## 2020-06-23 PROCEDURE — 3008F BODY MASS INDEX DOCD: CPT | Performed by: PHYSICIAN ASSISTANT

## 2020-06-23 PROCEDURE — 3077F SYST BP >= 140 MM HG: CPT | Performed by: PHYSICIAN ASSISTANT

## 2020-06-23 PROCEDURE — 4004F PT TOBACCO SCREEN RCVD TLK: CPT | Performed by: PHYSICIAN ASSISTANT

## 2020-06-23 PROCEDURE — 1160F RVW MEDS BY RX/DR IN RCRD: CPT | Performed by: PHYSICIAN ASSISTANT

## 2020-06-23 PROCEDURE — 82607 VITAMIN B-12: CPT

## 2020-06-23 PROCEDURE — 3080F DIAST BP >= 90 MM HG: CPT | Performed by: PHYSICIAN ASSISTANT

## 2020-06-23 PROCEDURE — 83036 HEMOGLOBIN GLYCOSYLATED A1C: CPT | Performed by: PHYSICIAN ASSISTANT

## 2020-06-23 PROCEDURE — 82306 VITAMIN D 25 HYDROXY: CPT

## 2020-06-23 PROCEDURE — 4010F ACE/ARB THERAPY RXD/TAKEN: CPT | Performed by: PHYSICIAN ASSISTANT

## 2020-06-23 PROCEDURE — 99214 OFFICE O/P EST MOD 30 MIN: CPT | Performed by: PHYSICIAN ASSISTANT

## 2020-06-23 PROCEDURE — 3046F HEMOGLOBIN A1C LEVEL >9.0%: CPT | Performed by: PHYSICIAN ASSISTANT

## 2020-06-23 RX ORDER — BENAZEPRIL HYDROCHLORIDE 20 MG/1
20 TABLET ORAL DAILY
Qty: 90 TABLET | Refills: 1 | Status: SHIPPED | OUTPATIENT
Start: 2020-06-23 | End: 2020-12-29 | Stop reason: SDUPTHER

## 2020-06-23 RX ORDER — PRIMIDONE 50 MG/1
200 TABLET ORAL 2 TIMES DAILY
Qty: 720 TABLET | Refills: 1 | Status: SHIPPED | OUTPATIENT
Start: 2020-06-23 | End: 2020-12-29 | Stop reason: SDUPTHER

## 2020-06-23 RX ORDER — ERGOCALCIFEROL (VITAMIN D2) 1250 MCG
50000 CAPSULE ORAL WEEKLY
Qty: 12 CAPSULE | Refills: 3 | Status: SHIPPED | OUTPATIENT
Start: 2020-06-23 | End: 2021-03-31 | Stop reason: ALTCHOICE

## 2020-06-23 RX ORDER — PAROXETINE HYDROCHLORIDE 20 MG/1
20 TABLET, FILM COATED ORAL DAILY
Qty: 90 TABLET | Refills: 1 | Status: SHIPPED | OUTPATIENT
Start: 2020-06-23 | End: 2020-12-29 | Stop reason: SDUPTHER

## 2020-06-23 RX ORDER — METOPROLOL SUCCINATE 50 MG/1
50 TABLET, EXTENDED RELEASE ORAL DAILY
Qty: 90 TABLET | Refills: 3 | Status: SHIPPED | OUTPATIENT
Start: 2020-06-23 | End: 2020-12-29 | Stop reason: SDUPTHER

## 2020-06-24 PROBLEM — F33.1 MODERATE EPISODE OF RECURRENT MAJOR DEPRESSIVE DISORDER (HCC): Status: ACTIVE | Noted: 2020-06-24

## 2020-06-25 ENCOUNTER — TELEPHONE (OUTPATIENT)
Dept: NEUROLOGY | Facility: CLINIC | Age: 75
End: 2020-06-25

## 2020-11-05 ENCOUNTER — TELEPHONE (OUTPATIENT)
Dept: NEUROLOGY | Facility: CLINIC | Age: 75
End: 2020-11-05

## 2020-11-06 DIAGNOSIS — I10 ESSENTIAL HYPERTENSION: ICD-10-CM

## 2020-11-06 DIAGNOSIS — G25.0 TREMOR, ESSENTIAL: ICD-10-CM

## 2020-11-06 DIAGNOSIS — F33.1 MODERATE EPISODE OF RECURRENT MAJOR DEPRESSIVE DISORDER (HCC): ICD-10-CM

## 2020-11-06 DIAGNOSIS — E11.319 TYPE 2 DIABETES MELLITUS WITH RETINOPATHY OF BOTH EYES, WITHOUT LONG-TERM CURRENT USE OF INSULIN, MACULAR EDEMA PRESENCE UNSPECIFIED, UNSPECIFIED RETINOPATHY SEVERITY (HCC): ICD-10-CM

## 2020-12-16 RX ORDER — PAROXETINE HYDROCHLORIDE 20 MG/1
TABLET, FILM COATED ORAL
Qty: 90 TABLET | Refills: 3 | OUTPATIENT
Start: 2020-12-16

## 2020-12-16 RX ORDER — BENAZEPRIL HYDROCHLORIDE 20 MG/1
TABLET ORAL
Qty: 90 TABLET | Refills: 3 | OUTPATIENT
Start: 2020-12-16

## 2020-12-16 RX ORDER — PRIMIDONE 50 MG/1
TABLET ORAL
Qty: 720 TABLET | Refills: 3 | OUTPATIENT
Start: 2020-12-16

## 2020-12-29 ENCOUNTER — OFFICE VISIT (OUTPATIENT)
Dept: FAMILY MEDICINE CLINIC | Facility: CLINIC | Age: 75
End: 2020-12-29
Payer: MEDICARE

## 2020-12-29 VITALS
BODY MASS INDEX: 29.26 KG/M2 | TEMPERATURE: 97.5 F | HEIGHT: 67 IN | DIASTOLIC BLOOD PRESSURE: 82 MMHG | SYSTOLIC BLOOD PRESSURE: 128 MMHG | RESPIRATION RATE: 18 BRPM | WEIGHT: 186.4 LBS | OXYGEN SATURATION: 97 % | HEART RATE: 106 BPM

## 2020-12-29 DIAGNOSIS — F33.1 MODERATE EPISODE OF RECURRENT MAJOR DEPRESSIVE DISORDER (HCC): ICD-10-CM

## 2020-12-29 DIAGNOSIS — G25.0 TREMOR, ESSENTIAL: ICD-10-CM

## 2020-12-29 DIAGNOSIS — L40.9 PSORIASIS: ICD-10-CM

## 2020-12-29 DIAGNOSIS — I10 ESSENTIAL HYPERTENSION: ICD-10-CM

## 2020-12-29 DIAGNOSIS — E11.319 DIABETIC RETINOPATHY OF BOTH EYES ASSOCIATED WITH TYPE 2 DIABETES MELLITUS, MACULAR EDEMA PRESENCE UNSPECIFIED, UNSPECIFIED RETINOPATHY SEVERITY (HCC): ICD-10-CM

## 2020-12-29 DIAGNOSIS — G25.0 BENIGN FAMILIAL TREMOR: ICD-10-CM

## 2020-12-29 DIAGNOSIS — E11.319 TYPE 2 DIABETES MELLITUS WITH RETINOPATHY OF BOTH EYES, WITHOUT LONG-TERM CURRENT USE OF INSULIN, MACULAR EDEMA PRESENCE UNSPECIFIED, UNSPECIFIED RETINOPATHY SEVERITY (HCC): Primary | ICD-10-CM

## 2020-12-29 DIAGNOSIS — R00.2 PALPITATIONS: ICD-10-CM

## 2020-12-29 DIAGNOSIS — E11.3299 BACKGROUND DIABETIC RETINOPATHY (HCC): ICD-10-CM

## 2020-12-29 LAB — SL AMB POCT HEMOGLOBIN AIC: 6.3 (ref ?–6.5)

## 2020-12-29 PROCEDURE — 83036 HEMOGLOBIN GLYCOSYLATED A1C: CPT | Performed by: FAMILY MEDICINE

## 2020-12-29 PROCEDURE — 99214 OFFICE O/P EST MOD 30 MIN: CPT | Performed by: FAMILY MEDICINE

## 2020-12-29 RX ORDER — PRIMIDONE 50 MG/1
200 TABLET ORAL 2 TIMES DAILY
Qty: 720 TABLET | Refills: 1 | Status: SHIPPED | OUTPATIENT
Start: 2020-12-29 | End: 2021-03-31 | Stop reason: SDUPTHER

## 2020-12-29 RX ORDER — BENAZEPRIL HYDROCHLORIDE 20 MG/1
20 TABLET ORAL DAILY
Qty: 90 TABLET | Refills: 1 | Status: SHIPPED | OUTPATIENT
Start: 2020-12-29 | End: 2021-03-31 | Stop reason: SDUPTHER

## 2020-12-29 RX ORDER — TRIAMCINOLONE ACETONIDE 0.25 MG/G
OINTMENT TOPICAL 2 TIMES DAILY
Qty: 30 G | Refills: 1 | Status: SHIPPED | OUTPATIENT
Start: 2020-12-29 | End: 2021-03-31 | Stop reason: SDUPTHER

## 2020-12-29 RX ORDER — PAROXETINE HYDROCHLORIDE 20 MG/1
20 TABLET, FILM COATED ORAL DAILY
Qty: 90 TABLET | Refills: 1 | Status: CANCELLED | OUTPATIENT
Start: 2020-12-29

## 2020-12-29 RX ORDER — MULTIVITAMIN
1 TABLET ORAL DAILY
COMMUNITY

## 2020-12-29 RX ORDER — METOPROLOL SUCCINATE 50 MG/1
50 TABLET, EXTENDED RELEASE ORAL DAILY
Qty: 90 TABLET | Refills: 3 | Status: SHIPPED | OUTPATIENT
Start: 2020-12-29 | End: 2021-03-31 | Stop reason: SDUPTHER

## 2020-12-29 RX ORDER — PAROXETINE HYDROCHLORIDE 20 MG/1
20 TABLET, FILM COATED ORAL DAILY
Qty: 30 TABLET | Refills: 0 | Status: SHIPPED | OUTPATIENT
Start: 2020-12-29 | End: 2021-03-31 | Stop reason: SDUPTHER

## 2020-12-29 RX ORDER — PAROXETINE HYDROCHLORIDE 20 MG/1
20 TABLET, FILM COATED ORAL DAILY
Qty: 90 TABLET | Refills: 1 | Status: SHIPPED | OUTPATIENT
Start: 2020-12-29 | End: 2021-03-31 | Stop reason: ALTCHOICE

## 2021-01-27 LAB
LEFT EYE DIABETIC RETINOPATHY: NORMAL
RIGHT EYE DIABETIC RETINOPATHY: NORMAL

## 2021-03-31 ENCOUNTER — OFFICE VISIT (OUTPATIENT)
Dept: FAMILY MEDICINE CLINIC | Facility: CLINIC | Age: 76
End: 2021-03-31
Payer: MEDICARE

## 2021-03-31 VITALS
DIASTOLIC BLOOD PRESSURE: 60 MMHG | WEIGHT: 173 LBS | HEIGHT: 67 IN | SYSTOLIC BLOOD PRESSURE: 146 MMHG | BODY MASS INDEX: 27.15 KG/M2 | OXYGEN SATURATION: 98 % | TEMPERATURE: 97.9 F | HEART RATE: 65 BPM

## 2021-03-31 DIAGNOSIS — R00.2 PALPITATIONS: ICD-10-CM

## 2021-03-31 DIAGNOSIS — L40.9 PSORIASIS: ICD-10-CM

## 2021-03-31 DIAGNOSIS — I10 ESSENTIAL HYPERTENSION: ICD-10-CM

## 2021-03-31 DIAGNOSIS — E11.319 DIABETIC RETINOPATHY OF BOTH EYES ASSOCIATED WITH TYPE 2 DIABETES MELLITUS, MACULAR EDEMA PRESENCE UNSPECIFIED, UNSPECIFIED RETINOPATHY SEVERITY (HCC): ICD-10-CM

## 2021-03-31 DIAGNOSIS — G25.0 ESSENTIAL TREMOR: ICD-10-CM

## 2021-03-31 DIAGNOSIS — E55.9 VITAMIN D DEFICIENCY: ICD-10-CM

## 2021-03-31 DIAGNOSIS — G25.0 TREMOR, ESSENTIAL: ICD-10-CM

## 2021-03-31 DIAGNOSIS — F33.1 MODERATE EPISODE OF RECURRENT MAJOR DEPRESSIVE DISORDER (HCC): ICD-10-CM

## 2021-03-31 DIAGNOSIS — J34.89 RHINORRHEA: ICD-10-CM

## 2021-03-31 DIAGNOSIS — E78.5 DYSLIPIDEMIA: ICD-10-CM

## 2021-03-31 DIAGNOSIS — E53.8 VITAMIN B12 DEFICIENCY: ICD-10-CM

## 2021-03-31 DIAGNOSIS — E11.319 TYPE 2 DIABETES MELLITUS WITH RETINOPATHY OF BOTH EYES, WITHOUT LONG-TERM CURRENT USE OF INSULIN, MACULAR EDEMA PRESENCE UNSPECIFIED, UNSPECIFIED RETINOPATHY SEVERITY (HCC): Primary | ICD-10-CM

## 2021-03-31 PROBLEM — I49.8 SINUS ARRHYTHMIA: Status: ACTIVE | Noted: 2021-03-31

## 2021-03-31 PROCEDURE — G0439 PPPS, SUBSEQ VISIT: HCPCS | Performed by: FAMILY MEDICINE

## 2021-03-31 PROCEDURE — 1123F ACP DISCUSS/DSCN MKR DOCD: CPT | Performed by: FAMILY MEDICINE

## 2021-03-31 RX ORDER — BENAZEPRIL HYDROCHLORIDE 10 MG/1
10 TABLET ORAL DAILY
Qty: 90 TABLET | Refills: 3 | Status: SHIPPED | OUTPATIENT
Start: 2021-03-31 | End: 2021-08-02

## 2021-03-31 RX ORDER — TRIAMCINOLONE ACETONIDE 0.25 MG/G
OINTMENT TOPICAL 2 TIMES DAILY
Qty: 80 G | Refills: 2 | Status: SHIPPED | OUTPATIENT
Start: 2021-03-31 | End: 2021-08-02 | Stop reason: SDUPTHER

## 2021-03-31 RX ORDER — FLUTICASONE PROPIONATE 50 MCG
2 SPRAY, SUSPENSION (ML) NASAL DAILY
Qty: 3 BOTTLE | Refills: 3 | Status: SHIPPED | OUTPATIENT
Start: 2021-03-31 | End: 2021-08-02 | Stop reason: SDUPTHER

## 2021-03-31 RX ORDER — METOPROLOL SUCCINATE 50 MG/1
50 TABLET, EXTENDED RELEASE ORAL DAILY
Qty: 90 TABLET | Refills: 3 | Status: SHIPPED | OUTPATIENT
Start: 2021-03-31 | End: 2021-08-02 | Stop reason: SDUPTHER

## 2021-03-31 RX ORDER — PRIMIDONE 50 MG/1
200 TABLET ORAL 2 TIMES DAILY
Qty: 720 TABLET | Refills: 3 | Status: SHIPPED | OUTPATIENT
Start: 2021-03-31 | End: 2021-11-24 | Stop reason: SDUPTHER

## 2021-03-31 RX ORDER — PAROXETINE HYDROCHLORIDE 20 MG/1
20 TABLET, FILM COATED ORAL DAILY
Qty: 90 TABLET | Refills: 3 | Status: SHIPPED | OUTPATIENT
Start: 2021-03-31 | End: 2021-08-02 | Stop reason: SDUPTHER

## 2021-03-31 NOTE — PROGRESS NOTES
Assessment and Plan:     Problem List Items Addressed This Visit        Endocrine    Diabetic retinopathy ([address] )    Relevant Medications    metFORMIN (GLUCOPHAGE) 1000 MG tablet    Type 2 diabetes mellitus with ophthalmic complication, without long-term current use of insulin (HCC) - Primary    Relevant Medications    benazepril (LOTENSIN) 10 mg tablet    metFORMIN (GLUCOPHAGE) 1000 MG tablet    Other Relevant Orders    Comprehensive metabolic panel    Lipid Panel with Direct LDL reflex    Hemoglobin A1C    TSH, 3rd generation with Free T4 reflex       Cardiovascular and Mediastinum    Essential hypertension    Relevant Medications    benazepril (LOTENSIN) 10 mg tablet    metoprolol succinate (TOPROL-XL) 50 mg 24 hr tablet    Other Relevant Orders    CBC and differential    Comprehensive metabolic panel    Lipid Panel with Direct LDL reflex       Nervous and Auditory    Essential tremor    Relevant Medications    primidone (MYSOLINE) 50 mg tablet       Other    Vitamin B12 deficiency    Relevant Orders    Vitamin B12    Vitamin D deficiency    Relevant Orders    Vitamin D 25 hydroxy    Moderate episode of recurrent major depressive disorder (HCC)    Relevant Medications    PARoxetine (PAXIL) 20 mg tablet    Dyslipidemia      Other Visit Diagnoses     Palpitations        Relevant Medications    metoprolol succinate (TOPROL-XL) 50 mg 24 hr tablet    Other Relevant Orders    TSH, 3rd generation with Free T4 reflex    Tremor, essential        Relevant Medications    primidone (MYSOLINE) 50 mg tablet        BMI Counseling: Body mass index is 27 1 kg/m²  The BMI is above normal  Nutrition recommendations include encouraging healthy choices of fruits and vegetables  Exercise recommendations include moderate physical activity 150 minutes/week  Patient presents today for Medicare wellness visit  He declines vaccinations including Pneumovax Shingrix and tetanus  We are going to try to get him a COVID vaccine    He also declines colon cancer screening as well as prostate cancer screening  He would like to hold off on lung cancer screening despite being a chronic smoker  He is now living in his son's but continues to complete most of his ADLs  He recently underwent surgery at [address] which has improved his right arm tremor significantly  Preventive health issues were discussed with patient, and age appropriate screening tests were ordered as noted in patient's After Visit Summary  Personalized health advice and appropriate referrals for health education or preventive services given if needed, as noted in patient's After Visit Summary       History of Present Illness:     Patient presents for Medicare Annual Wellness visit    Patient Care Team:  Elpidio Simental MD as PCP - General     Problem List:     Patient Active Problem List   Diagnosis    Essential tremor    Background diabetic retinopathy ([facility] )    Diabetic retinopathy ([facility] )    Eczema    Essential hypertension    Impotence of organic origin    Psoriasis    Sleep apnea    Vitamin B12 deficiency    Vitamin D deficiency    Type 2 diabetes mellitus with ophthalmic complication, without long-term current use of insulin ([address] )    Moderate episode of recurrent major depressive disorder ([facility] [facility] )    Dyslipidemia    Sinus arrhythmia      Past Medical and Surgical History:     Past Medical History:   Diagnosis Date    Hypercholesterolemia     Intermittent claudication ([address] )     Memory loss     last assessed 07/16/14    Mild cognitive impairment     last assessed 06/22/16    Vitamin D deficiency      Past Surgical History:   Procedure Laterality Date    TONSILLECTOMY AND ADENOIDECTOMY        Family History:     Family History   Problem Relation Age of Onset    Melanoma Father         skin      Social History:        Social History     Socioeconomic History    Marital status: /Civil Union     Spouse name: None    Number of children: None    Years of education: None    Highest education level: None   Occupational History    None   Social Needs    Financial resource strain: None    Food insecurity     Worry: None     Inability: None    Transportation needs     Medical: None     Non-medical: None   Tobacco Use    Smoking status: Current Every Day Smoker     Types: Cigars     Last attempt to quit: 2007     Years since quittin 7    Smokeless tobacco: Former User   Substance and Sexual Activity    Alcohol use: Not Currently     Comment: social; rare    Drug use: Never    Sexual activity: None   Lifestyle    Physical activity     Days per week: None     Minutes per session: None    Stress: None   Relationships    Social connections     Talks on phone: None     Gets together: None     Attends Tenri[employer] service: None     Active member of club or organization: None     Attends meetings of clubs or organizations: None     Relationship status: None    Intimate partner violence     Fear of current or ex partner: None     Emotionally abused: None     Physically abused: None     Forced sexual activity: None   Other Topics Concern    None   Social History Narrative    None      Medications and Allergies:     Current Outpatient Medications   Medication Sig Dispense Refill    benazepril (LOTENSIN) 10 mg tablet Take 1 tablet (10 mg total) by mouth daily 90 tablet 3    metFORMIN (GLUCOPHAGE) 1000 MG tablet Take 1 tablet (1,000 mg total) by mouth 2 (two) times a day 180 tablet 3    metoprolol succinate (TOPROL-XL) 50 mg 24 hr tablet Take 1 tablet (50 mg total) by mouth daily 90 tablet 3    Multiple Vitamin (multivitamin) tablet Take 1 tablet by mouth daily      NON FORMULARY GlucoBurn      PARoxetine (PAXIL) 20 mg tablet Take 1 tablet (20 mg total) by mouth daily 90 tablet 3    primidone (MYSOLINE) 50 mg tablet Take 4 tablets (200 mg total) by mouth 2 (two) times a day 720 tablet 3    triamcinolone (KENALOG) 0 025 % ointment Apply topically 2 (two) times a day 30 g 1     No current facility-administered medications for this visit  Allergies   Allergen Reactions    Cefadroxil Headache    Pioglitazone     Sulfa Antibiotics     Victoza [Liraglutide] Rash      Immunizations: There is no immunization history for the selected administration types on file for this patient  Health Maintenance:         Topic Date Due    Colorectal Cancer Screening  Never done    Hepatitis C Screening  Completed         Topic Date Due    COVID-19 Vaccine (1) Never done      Medicare Health Risk Assessment:     /60   Pulse 65   Temp 97 9 °F (36 6 °C)   Ht 5' 7" (1 702 m)   Wt 78 5 kg (173 lb)   SpO2 98%   BMI 27 10 kg/m²      Butch Kaur is here for his Subsequent Wellness visit  Health Risk Assessment:   Patient rates overall health as very good  Patient feels that their physical health rating is slightly worse  Patient is satisfied with their life  Eyesight was rated as same  Hearing was rated as same  Patient feels that their emotional and mental health rating is slightly better  Patients states they are never, rarely angry  Patient states they are never, rarely unusually tired/fatigued  Pain experienced in the last 7 days has been none  Patient states that he has experienced no weight loss or gain in last 6 months  Depression Screening:   PHQ-2 Score: 0  PHQ-9 Score: 1      Fall Risk Screening: In the past year, patient has experienced: no history of falling in past year      Home Safety:  Patient does not have trouble with stairs inside or outside of their home  Patient has working smoke alarms and has no working carbon monoxide detector  Home safety hazards include: none  Nutrition:   Current diet is Regular  Medications:   Patient is currently taking over-the-counter supplements  OTC medications include: see medication list  Patient is not able to manage medications       Activities of Daily Living (ADLs)/Instrumental Activities of Daily Living (IADLs):   Walk and transfer into and out of bed and chair?: Yes  Dress and groom yourself?: Yes    Bathe or shower yourself?: Yes    Feed yourself? Yes  Do your laundry/housekeeping?: No  Manage your money, pay your bills and track your expenses?: Yes  Make your own meals?: No    Do your own shopping?: Yes    Previous Hospitalizations:   Any hospitalizations or ED visits within the last 12 months?: Yes    How many hospitalizations have you had in the last year?: 1-2    Advance Care Planning:   Living will: Yes    Durable POA for healthcare: Yes    Advanced directive: Yes    End of Life Decisions reviewed with patient: Yes      Cognitive Screening:   Provider or family/friend/caregiver concerned regarding cognition?: No    PREVENTIVE SCREENINGS      Cardiovascular Screening:    General: Screening Current      Diabetes Screening:     General: Screening Not Indicated and History Diabetes      Colorectal Cancer Screening:     General: Risks and Benefits Discussed      Osteoporosis Screening:    General: Screening Not Indicated      Abdominal Aortic Aneurysm (AAA) Screening:    Risk factors include: age between 73-67 yo and tobacco use        Lung Cancer Screening:     General: Screening Not Indicated, Patient Declines and Risks and Benefits Discussed      Hepatitis C Screening:    General: Screening Current    Screening, Brief Intervention, and Referral to Treatment (SBIRT)    Screening  Typical number of drinks in a day: 0  Typical number of drinks in a week: 0  Interpretation: Low risk drinking behavior      Single Item Drug Screening:  How often have you used an illegal drug (including marijuana) or a prescription medication for non-medical reasons in the past year? never    Single Item Drug Screen Score: 0  Interpretation: Negative screen for possible drug use disorder      Patricia Rawls MD

## 2021-03-31 NOTE — PROGRESS NOTES
Assessment/Plan:       Problem List Items Addressed This Visit        Endocrine    Diabetic retinopathy ([facility] [address] )    Relevant Medications    metFORMIN (GLUCOPHAGE) 1000 MG tablet    Type 2 diabetes mellitus with ophthalmic complication, without long-term current use of insulin (HCC) - Primary    Relevant Medications    benazepril (LOTENSIN) 10 mg tablet    metFORMIN (GLUCOPHAGE) 1000 MG tablet    Other Relevant Orders    Comprehensive metabolic panel    Lipid Panel with Direct LDL reflex    Hemoglobin A1C    TSH, 3rd generation with Free T4 reflex       Cardiovascular and Mediastinum    Essential hypertension    Relevant Medications    benazepril (LOTENSIN) 10 mg tablet    metoprolol succinate (TOPROL-XL) 50 mg 24 hr tablet    Other Relevant Orders    CBC and differential    Comprehensive metabolic panel    Lipid Panel with Direct LDL reflex       Nervous and Auditory    Essential tremor    Relevant Medications    primidone (MYSOLINE) 50 mg tablet       Musculoskeletal and Integument    Psoriasis    Relevant Medications    triamcinolone (KENALOG) 0 025 % ointment       Other    Vitamin B12 deficiency    Relevant Orders    Vitamin B12    Vitamin D deficiency    Relevant Orders    Vitamin D 25 hydroxy    Moderate episode of recurrent major depressive disorder (HCC)    Relevant Medications    PARoxetine (PAXIL) 20 mg tablet    Dyslipidemia    Rhinorrhea     He is having some rhinorrhea over the past 2 months or so  I will try him on Flonase  Relevant Medications    fluticasone (FLONASE) 50 mcg/act nasal spray      Other Visit Diagnoses     Palpitations        Relevant Medications    metoprolol succinate (TOPROL-XL) 50 mg 24 hr tablet    Other Relevant Orders    TSH, 3rd generation with Free T4 reflex    Tremor, essential        Relevant Medications    primidone (MYSOLINE) 50 mg tablet            Subjective:      Patient ID: Milli Thomas is a 70 y o  male      HPI     Patient presents today for follow-up for chronic health issues  He has lost some weight  He notes he has been eating better since he has lived with his son and his son has cooked better food for him     he has history of type 2 diabetes and denies any excessive polyuria or polydipsia  He has history of depression which was quite prolonged after his wife  but this seems to be stable with him living with his wife  He remains on paroxetine  His tremor has improved significantly since an intracranial intervention done at the [address] for right hand tremor  He complains of some chronic persistent clear runny nasal discharge over the past 2 months  He has had this intermittently throughout his life but this seems to be worse this year  He denies any fever or chills  He has no cough or wheeze      The following portions of the patient's history were reviewed and updated as appropriate: allergies, current medications, past family history, past medical history, past social history, past surgical history and problem list       Current Outpatient Medications:     benazepril (LOTENSIN) 10 mg tablet, Take 1 tablet (10 mg total) by mouth daily, Disp: 90 tablet, Rfl: 3    fluticasone (FLONASE) 50 mcg/act nasal spray, 2 sprays into each nostril daily, Disp: 3 Bottle, Rfl: 3    metFORMIN (GLUCOPHAGE) 1000 MG tablet, Take 1 tablet (1,000 mg total) by mouth 2 (two) times a day, Disp: 180 tablet, Rfl: 3    metoprolol succinate (TOPROL-XL) 50 mg 24 hr tablet, Take 1 tablet (50 mg total) by mouth daily, Disp: 90 tablet, Rfl: 3    Multiple Vitamin (multivitamin) tablet, Take 1 tablet by mouth daily, Disp: , Rfl:     NON FORMULARY, GlucoBurn, Disp: , Rfl:     PARoxetine (PAXIL) 20 mg tablet, Take 1 tablet (20 mg total) by mouth daily, Disp: 90 tablet, Rfl: 3    primidone (MYSOLINE) 50 mg tablet, Take 4 tablets (200 mg total) by mouth 2 (two) times a day, Disp: 720 tablet, Rfl: 3    triamcinolone (KENALOG) 0 025 % ointment, Apply topically 2 (two) times a day, Disp: 80 g, Rfl: 2     Review of Systems   Constitutional: Negative for appetite change, chills, fatigue, fever and unexpected weight change (He has lost weight intentionally )  HENT: Negative for trouble swallowing  Eyes: Negative for visual disturbance  Respiratory: Negative for cough, chest tightness, shortness of breath and wheezing  Cardiovascular: Negative for chest pain, palpitations and leg swelling  Gastrointestinal: Negative for abdominal distention, abdominal pain, blood in stool, constipation and diarrhea  Endocrine: Negative for polyuria  Genitourinary: Negative for difficulty urinating and flank pain  Musculoskeletal: Negative for arthralgias and myalgias  Skin: Positive for rash  Neurological: Positive for tremors  Negative for dizziness, seizures, syncope and light-headedness  Hematological: Negative for adenopathy  Does not bruise/bleed easily  Psychiatric/Behavioral: Negative for decreased concentration, dysphoric mood and sleep disturbance  The patient is not nervous/anxious  Objective:      /60   Pulse 65   Temp 97 9 °F (36 6 °C)   Ht 5' 7" (1 702 m)   Wt 78 5 kg (173 lb)   SpO2 98%   BMI 27 10 kg/m²          Physical Exam  Constitutional:       General: He is not in acute distress  Appearance: He is well-developed  He is not diaphoretic  HENT:      Head: Normocephalic  Eyes:      General:         Right eye: No discharge  Left eye: No discharge  Pupils: Pupils are equal, round, and reactive to light  Neck:      Thyroid: No thyromegaly  Trachea: No tracheal deviation  Cardiovascular:      Rate and Rhythm: Normal rate and regular rhythm  Heart sounds: Normal heart sounds  No murmur  Comments: He has marked premature beat  Pulmonary:      Effort: Pulmonary effort is normal  No respiratory distress  Breath sounds: No wheezing or rales  Abdominal:      General: There is no distension  Palpations: Abdomen is soft  Tenderness: There is no abdominal tenderness  Musculoskeletal: Normal range of motion  Lymphadenopathy:      Cervical: No cervical adenopathy  Skin:     General: Skin is warm  Findings: No erythema  Neurological:      Mental Status: He is alert and oriented to person, place, and time  Cranial Nerves: No cranial nerve deficit  Psychiatric:         Thought Content:  Thought content normal          Judgment: Judgment normal            [place] MD Jennifer

## 2021-03-31 NOTE — ASSESSMENT & PLAN NOTE
He has significant eczema on bilateral lower extremities  This may be contributing to some swelling of his right lower leg    Refill Kenalog cream

## 2021-03-31 NOTE — ASSESSMENT & PLAN NOTE
Patient is noted to have sinus arrhythmia on exam   He recently had an EKG done at the [address] [place] [address] which confirmed the same

## 2021-04-01 ENCOUNTER — IMMUNIZATIONS (OUTPATIENT)
Dept: FAMILY MEDICINE CLINIC | Facility: HOSPITAL | Age: 76
End: 2021-04-01

## 2021-04-01 DIAGNOSIS — Z23 ENCOUNTER FOR IMMUNIZATION: Primary | ICD-10-CM

## 2021-04-01 PROCEDURE — 91301 SARS-COV-2 / COVID-19 MRNA VACCINE (MODERNA) 100 MCG: CPT

## 2021-04-01 PROCEDURE — 0011A SARS-COV-2 / COVID-19 MRNA VACCINE (MODERNA) 100 MCG: CPT

## 2021-05-05 ENCOUNTER — IMMUNIZATIONS (OUTPATIENT)
Dept: FAMILY MEDICINE CLINIC | Facility: HOSPITAL | Age: 76
End: 2021-05-05

## 2021-05-05 DIAGNOSIS — Z23 ENCOUNTER FOR IMMUNIZATION: Primary | ICD-10-CM

## 2021-05-05 PROCEDURE — 91301 SARS-COV-2 / COVID-19 MRNA VACCINE (MODERNA) 100 MCG: CPT

## 2021-05-05 PROCEDURE — 0012A SARS-COV-2 / COVID-19 MRNA VACCINE (MODERNA) 100 MCG: CPT

## 2021-05-25 ENCOUNTER — TELEPHONE (OUTPATIENT)
Dept: NEUROLOGY | Facility: CLINIC | Age: 76
End: 2021-05-25

## 2021-05-25 NOTE — TELEPHONE ENCOUNTER
Pt's dtr Nellie Hutchins called and states that pt had the MRI guided US done on 1/14/21  Tremors are slightly better but it did not take away the tremors completely  Pt is still having issue w/ eating and major daily living activities  Pt takes primidone 50 mg 4 tabs bid  States that pt would like to try a device called [employer]  Requesting script be mailed to the address on file  Dr Lazara Lo, I am not familiar w/ this device  Where can pt can this device  Not sure if this is covered by the insurance  Spoke w/ pt and he is giving our office permission to discuss his health status, care and needs w/ his dtr Nellie Hutchins  [phone] ok to leave detailed message or call pt at [phone]

## 2021-05-26 NOTE — TELEPHONE ENCOUNTER
This is a device that typically is not covered by insurance but requires a prescription   I am willing to write the prescrition but in order to do so I would have to see him to assess tremor and measure his wrist

## 2021-05-26 NOTE — TELEPHONE ENCOUNTER
Called and advised pt's dtr Jeff Seth of the below  She verbalized clear understanding of all instructions  Agreeable to your recommendation but the problem is pt could not get sooner appt  His appt is scheduled in Dec  Already placed on cancellation list    Alphonso, can you see if you can accommodate this pt to Dr Reagan's schedule?

## 2021-06-18 ENCOUNTER — TELEPHONE (OUTPATIENT)
Dept: NEUROLOGY | Facility: CLINIC | Age: 76
End: 2021-06-18

## 2021-06-18 NOTE — TELEPHONE ENCOUNTER
Called pt and LMOM stating that I am calling to schedule an appt for a f/u for the soonest available slot  I was looking to schedule this appointment with Steve Dumont as she has the soonest available appt  I then asked the patient to please give us a call back to get the appt scheduled

## 2021-06-23 ENCOUNTER — TELEPHONE (OUTPATIENT)
Dept: NEUROLOGY | Facility: CLINIC | Age: 76
End: 2021-06-23

## 2021-06-23 NOTE — TELEPHONE ENCOUNTER
Called patient in regards to scheduling a sooner appt w/ Dr Saad Rojas as requested by Gael Simpson  I stated that there are some openings in her schedule so then I asked for the patient to return the call to get the appt scheduled

## 2021-07-07 ENCOUNTER — TELEPHONE (OUTPATIENT)
Dept: NEUROLOGY | Facility: CLINIC | Age: 76
End: 2021-07-07

## 2021-07-07 NOTE — TELEPHONE ENCOUNTER
Left message for patient to call back for sooner appt - availability with Dr Duncan Ends on 7/8 @ 2p in [facility] - please assist if still available

## 2021-07-12 ENCOUNTER — TELEPHONE (OUTPATIENT)
Dept: NEUROLOGY | Facility: CLINIC | Age: 76
End: 2021-07-12

## 2021-07-12 NOTE — TELEPHONE ENCOUNTER
Called pt and spoke to Laura Dietrich in regards to scheduling a sooner appt as requested by the patient  I then offered an appt on 08/18/2021 w/ Dr Denae Rogers in our [facility] location at 2:00 PM  Patient was happy with this appt as he got in sooner

## 2021-08-02 ENCOUNTER — OFFICE VISIT (OUTPATIENT)
Dept: FAMILY MEDICINE CLINIC | Facility: CLINIC | Age: 76
End: 2021-08-02
Payer: MEDICARE

## 2021-08-02 VITALS
SYSTOLIC BLOOD PRESSURE: 110 MMHG | BODY MASS INDEX: 25.46 KG/M2 | HEIGHT: 67 IN | OXYGEN SATURATION: 94 % | DIASTOLIC BLOOD PRESSURE: 70 MMHG | HEART RATE: 105 BPM | RESPIRATION RATE: 12 BRPM | WEIGHT: 162.2 LBS

## 2021-08-02 DIAGNOSIS — E53.8 VITAMIN B12 DEFICIENCY: ICD-10-CM

## 2021-08-02 DIAGNOSIS — E55.9 VITAMIN D DEFICIENCY: ICD-10-CM

## 2021-08-02 DIAGNOSIS — L40.9 PSORIASIS: ICD-10-CM

## 2021-08-02 DIAGNOSIS — R00.2 PALPITATIONS: ICD-10-CM

## 2021-08-02 DIAGNOSIS — Z12.11 COLON CANCER SCREENING: ICD-10-CM

## 2021-08-02 DIAGNOSIS — F33.1 MODERATE EPISODE OF RECURRENT MAJOR DEPRESSIVE DISORDER (HCC): ICD-10-CM

## 2021-08-02 DIAGNOSIS — E11.319 DIABETIC RETINOPATHY OF BOTH EYES ASSOCIATED WITH TYPE 2 DIABETES MELLITUS, MACULAR EDEMA PRESENCE UNSPECIFIED, UNSPECIFIED RETINOPATHY SEVERITY (HCC): ICD-10-CM

## 2021-08-02 DIAGNOSIS — Z12.5 PROSTATE CANCER SCREENING: ICD-10-CM

## 2021-08-02 DIAGNOSIS — J34.89 RHINORRHEA: ICD-10-CM

## 2021-08-02 DIAGNOSIS — R63.4 ABNORMAL WEIGHT LOSS: ICD-10-CM

## 2021-08-02 DIAGNOSIS — E11.319 TYPE 2 DIABETES MELLITUS WITH RETINOPATHY OF BOTH EYES, WITHOUT LONG-TERM CURRENT USE OF INSULIN, MACULAR EDEMA PRESENCE UNSPECIFIED, UNSPECIFIED RETINOPATHY SEVERITY (HCC): Primary | ICD-10-CM

## 2021-08-02 DIAGNOSIS — E78.5 DYSLIPIDEMIA: ICD-10-CM

## 2021-08-02 DIAGNOSIS — I10 ESSENTIAL HYPERTENSION: ICD-10-CM

## 2021-08-02 LAB — SL AMB POCT HEMOGLOBIN AIC: 6 (ref ?–6.5)

## 2021-08-02 PROCEDURE — 83036 HEMOGLOBIN GLYCOSYLATED A1C: CPT | Performed by: FAMILY MEDICINE

## 2021-08-02 PROCEDURE — 99215 OFFICE O/P EST HI 40 MIN: CPT | Performed by: FAMILY MEDICINE

## 2021-08-02 RX ORDER — METOPROLOL SUCCINATE 25 MG/1
25 TABLET, EXTENDED RELEASE ORAL DAILY
Qty: 90 TABLET | Refills: 3 | Status: SHIPPED | OUTPATIENT
Start: 2021-08-02 | End: 2021-08-02 | Stop reason: SDUPTHER

## 2021-08-02 RX ORDER — PAROXETINE 30 MG/1
30 TABLET, FILM COATED ORAL DAILY
Qty: 90 TABLET | Refills: 3 | Status: SHIPPED | OUTPATIENT
Start: 2021-08-02 | End: 2021-08-02 | Stop reason: SDUPTHER

## 2021-08-02 RX ORDER — FLUTICASONE PROPIONATE 50 MCG
2 SPRAY, SUSPENSION (ML) NASAL DAILY
Qty: 16 G | Refills: 5 | Status: SHIPPED | OUTPATIENT
Start: 2021-08-02 | End: 2021-08-02 | Stop reason: SDUPTHER

## 2021-08-02 RX ORDER — TRIAMCINOLONE ACETONIDE 0.25 MG/G
OINTMENT TOPICAL 2 TIMES DAILY
Qty: 80 G | Refills: 2 | Status: SHIPPED | OUTPATIENT
Start: 2021-08-02 | End: 2021-08-02 | Stop reason: SDUPTHER

## 2021-08-02 NOTE — ASSESSMENT & PLAN NOTE
Lab Results   Component Value Date    HGBA1C 6 0 08/02/2021   He has had significant weight loss which will be looking into  We are going to stop metformin at this time as he is having significant diarrhea on most days and may not need medication at this time due to significant weight loss

## 2021-08-02 NOTE — ASSESSMENT & PLAN NOTE
Blood pressure is low today  Stop benazepril  Decrease his metoprolol to 25 mg daily from 50 mg daily  I did send a new prescription for this

## 2021-08-02 NOTE — PROGRESS NOTES
Assessment/Plan:       Problem List Items Addressed This Visit        Endocrine    Diabetic retinopathy ([facility] )    Relevant Orders    Comprehensive metabolic panel    Lipid Panel with Direct LDL reflex    Type 2 diabetes mellitus with ophthalmic complication, without long-term current use of insulin ([facility] ) - Primary       Lab Results   Component Value Date    HGBA1C 6 0 08/02/2021   He has had significant weight loss which will be looking into  We are going to stop metformin at this time as he is having significant diarrhea on most days and may not need medication at this time due to significant weight loss  Relevant Orders    POCT hemoglobin A1c (Completed)    Comprehensive metabolic panel    Lipid Panel with Direct LDL reflex       Cardiovascular and Mediastinum    Essential hypertension     Blood pressure is low today  Stop benazepril  Decrease his metoprolol to 25 mg daily from 50 mg daily  I did send a new prescription for this  Relevant Medications    metoprolol succinate (TOPROL-XL) 25 mg 24 hr tablet    Other Relevant Orders    CBC and differential    Comprehensive metabolic panel    Lipid Panel with Direct LDL reflex       Musculoskeletal and Integument    Psoriasis     He has really only been using his triamcinolone daily  Have referred him to Dermatology as he certainly could use more aggressive treatment for what appears to be worsening psoriasis  In the meantime, using his triamcinolone twice daily would be helpful  Relevant Medications    triamcinolone (KENALOG) 0 025 % ointment    Other Relevant Orders    Ambulatory referral to Dermatology       Other    Vitamin B12 deficiency    Relevant Orders    Vitamin B12    Vitamin D deficiency    Relevant Orders    Vitamin D 25 hydroxy    Moderate episode of recurrent major depressive disorder ([facility] )     He does seem to have an active depression present so we are going to bump up paroxetine 30 mg daily  Follow-up within a month  Relevant Medications    PARoxetine (PAXIL) 30 mg tablet    Other Relevant Orders    CBC and differential    TSH, 3rd generation with Free T4 reflex    Dyslipidemia    Relevant Orders    Comprehensive metabolic panel    Lipid Panel with Direct LDL reflex    Rhinorrhea    Relevant Medications    fluticasone (FLONASE) 50 mcg/act nasal spray    Abnormal weight loss     Weight loss is certainly concerning  Check labs as outlined  Stop metformin with ongoing diarrhea  He refuses GI evaluation for colonoscopy at this time  Check CT chest abdomen pelvis  Relevant Orders    TSH, 3rd generation with Free T4 reflex    CT chest abdomen pelvis w contrast      Other Visit Diagnoses     Palpitations        Relevant Medications    metoprolol succinate (TOPROL-XL) 25 mg 24 hr tablet    Prostate cancer screening        Relevant Orders    PSA, Total Screen    Colon cancer screening        Relevant Orders    Cologuard            Subjective:      Patient ID: Stephane Hedrick is a 70 y o  male  HPI the patient presents today accompanied by his son  He has now been living with his son over the past several months  He is here for follow-up for management of routine healthcare but is noted to have significant unintentional weight loss over the past several months  Patient does admit he has been feeling depressed regarding the loss of his wife in 2018 as well as severe intention tremor that has recurred significantly since his intervention at [place] in January  Patient admits his appetite is relatively poor  He feels he is intermittently mildly depressed  His son seems to think he is suffering significantly from anxiety depression but the patient does minimize this to a degree  Patient notes he is having some intermittent significant diarrhea over the past 4-6 months which can occur up to 2-3 times per day  He has refused colon cancer screening in the past and continues to refuse colonoscopy    His psoriasis seems to be getting much worse as well  He has been using triamcinolone daily on occasion  He has history of diabetes and denies any excessive polyuria or polydipsia  He has not been checking glucose readings  He does have a history of hypertension has experienced some recent lightheadedness  This mostly occurs upon standing  He denies any chest pain or palpitations    The following portions of the patient's history were reviewed and updated as appropriate: allergies, current medications, past family history, past medical history, past social history, past surgical history and problem list       Current Outpatient Medications:     fluticasone (FLONASE) 50 mcg/act nasal spray, 2 sprays into each nostril daily, Disp: 16 g, Rfl: 5    metoprolol succinate (TOPROL-XL) 25 mg 24 hr tablet, Take 1 tablet (25 mg total) by mouth daily, Disp: 90 tablet, Rfl: 3    Multiple Vitamin (multivitamin) tablet, Take 1 tablet by mouth daily, Disp: , Rfl:     NON FORMULARY, GlucoBurn, Disp: , Rfl:     PARoxetine (PAXIL) 30 mg tablet, Take 1 tablet (30 mg total) by mouth daily, Disp: 90 tablet, Rfl: 3    primidone (MYSOLINE) 50 mg tablet, Take 4 tablets (200 mg total) by mouth 2 (two) times a day, Disp: 720 tablet, Rfl: 3    triamcinolone (KENALOG) 0 025 % ointment, Apply topically 2 (two) times a day, Disp: 80 g, Rfl: 2     Review of Systems   Constitutional: Positive for appetite change, fatigue and unexpected weight change  Negative for chills and fever  HENT: Negative for trouble swallowing  Eyes: Negative for visual disturbance  Respiratory: Negative for cough, chest tightness, shortness of breath and wheezing  Cardiovascular: Negative for chest pain, palpitations and leg swelling  Gastrointestinal: Negative for abdominal distention, abdominal pain, blood in stool, constipation and diarrhea  Endocrine: Negative for polyuria  Genitourinary: Negative for difficulty urinating and flank pain  Musculoskeletal: Negative for arthralgias and myalgias  Skin: Negative for rash  Neurological: Positive for tremors and light-headedness  Negative for dizziness, syncope, weakness and headaches  Hematological: Negative for adenopathy  Does not bruise/bleed easily  Psychiatric/Behavioral: Positive for dysphoric mood  Negative for sleep disturbance and suicidal ideas  The patient is not nervous/anxious  Objective:      /70 (BP Location: Right leg, Patient Position: Sitting, Cuff Size: Standard)   Pulse 105   Resp 12   Ht 5' 7" (1 702 m)   Wt 73 6 kg (162 lb 3 2 oz)   SpO2 94%   BMI 25 40 kg/m²          Physical Exam  Constitutional:       General: He is not in acute distress  Appearance: He is well-developed  He is ill-appearing  He is not diaphoretic  Comments: He has lost significant weight   HENT:      Head: Normocephalic  Eyes:      General:         Right eye: No discharge  Left eye: No discharge  Pupils: Pupils are equal, round, and reactive to light  Neck:      Thyroid: No thyromegaly  Trachea: No tracheal deviation  Cardiovascular:      Rate and Rhythm: Normal rate and regular rhythm  Heart sounds: Normal heart sounds  No murmur heard  Pulmonary:      Effort: Pulmonary effort is normal  No respiratory distress  Breath sounds: No wheezing or rales  Abdominal:      General: There is no distension  Palpations: Abdomen is soft  Tenderness: There is no abdominal tenderness  Musculoskeletal:         General: No swelling  Normal range of motion  Right lower leg: No edema  Left lower leg: No edema  Lymphadenopathy:      Cervical: No cervical adenopathy  Skin:     General: Skin is warm  Findings: No erythema  Neurological:      Mental Status: He is alert and oriented to person, place, and time  Cranial Nerves: No cranial nerve deficit        Gait: Gait abnormal       Comments: Significant essential tremor Psychiatric:         Thought Content:  Thought content normal          Judgment: Judgment normal            Yamilet Vasquez MD

## 2021-08-02 NOTE — ASSESSMENT & PLAN NOTE
He does seem to have an active depression present so we are going to bump up paroxetine 30 mg daily  Follow-up within a month

## 2021-08-02 NOTE — PATIENT INSTRUCTIONS
Essential tremor  Follow with Neurology  Type 2 diabetes mellitus with ophthalmic complication, without long-term current use of insulin ([facility])    Lab Results   Component Value Date    HGBA1C 6 0 08/02/2021   He has had significant weight loss which will be looking into  We are going to stop metformin at this time as he is having significant diarrhea on most days and may not need medication at this time due to significant weight loss  Essential hypertension  Blood pressure is low today  Stop benazepril  Decrease his metoprolol to 25 mg daily from 50 mg daily  I did send a new prescription for this  Moderate episode of recurrent major depressive disorder ([facility] )  He does seem to have an active depression present so we are going to bump up paroxetine 30 mg daily  Follow-up within a month  Abnormal weight loss  Weight loss is certainly concerning  Check labs as outlined  Stop metformin with ongoing diarrhea  He refuses GI evaluation for colonoscopy at this time  Check CT chest abdomen pelvis  Psoriasis  He has really only been using his triamcinolone daily  Have referred him to Dermatology as he certainly could use more aggressive treatment for what appears to be worsening psoriasis  In the meantime, using his triamcinolone twice daily would be helpful

## 2021-08-02 NOTE — ASSESSMENT & PLAN NOTE
He has really only been using his triamcinolone daily  Have referred him to Dermatology as he certainly could use more aggressive treatment for what appears to be worsening psoriasis  In the meantime, using his triamcinolone twice daily would be helpful

## 2021-08-02 NOTE — ASSESSMENT & PLAN NOTE
Weight loss is certainly concerning  Check labs as outlined  Stop metformin with ongoing diarrhea  He refuses GI evaluation for colonoscopy at this time  Check CT chest abdomen pelvis

## 2021-08-03 ENCOUNTER — APPOINTMENT (OUTPATIENT)
Dept: LAB | Facility: MEDICAL CENTER | Age: 76
End: 2021-08-03
Payer: MEDICARE

## 2021-08-03 DIAGNOSIS — Z12.5 PROSTATE CANCER SCREENING: ICD-10-CM

## 2021-08-03 DIAGNOSIS — E11.319 TYPE 2 DIABETES MELLITUS WITH RETINOPATHY OF BOTH EYES, WITHOUT LONG-TERM CURRENT USE OF INSULIN, MACULAR EDEMA PRESENCE UNSPECIFIED, UNSPECIFIED RETINOPATHY SEVERITY (HCC): ICD-10-CM

## 2021-08-03 DIAGNOSIS — E55.9 VITAMIN D DEFICIENCY: ICD-10-CM

## 2021-08-03 DIAGNOSIS — E78.5 DYSLIPIDEMIA: ICD-10-CM

## 2021-08-03 DIAGNOSIS — E53.8 VITAMIN B12 DEFICIENCY: ICD-10-CM

## 2021-08-03 DIAGNOSIS — I10 ESSENTIAL HYPERTENSION: ICD-10-CM

## 2021-08-03 DIAGNOSIS — F33.1 MODERATE EPISODE OF RECURRENT MAJOR DEPRESSIVE DISORDER (HCC): ICD-10-CM

## 2021-08-03 DIAGNOSIS — R63.4 ABNORMAL WEIGHT LOSS: ICD-10-CM

## 2021-08-03 DIAGNOSIS — E11.319 DIABETIC RETINOPATHY OF BOTH EYES ASSOCIATED WITH TYPE 2 DIABETES MELLITUS, MACULAR EDEMA PRESENCE UNSPECIFIED, UNSPECIFIED RETINOPATHY SEVERITY (HCC): ICD-10-CM

## 2021-08-03 LAB
25(OH)D3 SERPL-MCNC: 28.4 NG/ML (ref 30–100)
ALBUMIN SERPL BCP-MCNC: 3.8 G/DL (ref 3.5–5)
ALP SERPL-CCNC: 102 U/L (ref 46–116)
ALT SERPL W P-5'-P-CCNC: 26 U/L (ref 12–78)
ANION GAP SERPL CALCULATED.3IONS-SCNC: 3 MMOL/L (ref 4–13)
AST SERPL W P-5'-P-CCNC: 19 U/L (ref 5–45)
BASOPHILS # BLD AUTO: 0.04 THOUSANDS/ΜL (ref 0–0.1)
BASOPHILS NFR BLD AUTO: 0 % (ref 0–1)
BILIRUB SERPL-MCNC: 0.53 MG/DL (ref 0.2–1)
BUN SERPL-MCNC: 13 MG/DL (ref 5–25)
CALCIUM SERPL-MCNC: 9.5 MG/DL (ref 8.3–10.1)
CHLORIDE SERPL-SCNC: 103 MMOL/L (ref 100–108)
CHOLEST SERPL-MCNC: 153 MG/DL (ref 50–200)
CO2 SERPL-SCNC: 31 MMOL/L (ref 21–32)
CREAT SERPL-MCNC: 0.52 MG/DL (ref 0.6–1.3)
EOSINOPHIL # BLD AUTO: 0.26 THOUSAND/ΜL (ref 0–0.61)
EOSINOPHIL NFR BLD AUTO: 3 % (ref 0–6)
ERYTHROCYTE [DISTWIDTH] IN BLOOD BY AUTOMATED COUNT: 15.7 % (ref 11.6–15.1)
EST. AVERAGE GLUCOSE BLD GHB EST-MCNC: 120 MG/DL
GFR SERPL CREATININE-BSD FRML MDRD: 106 ML/MIN/1.73SQ M
GLUCOSE P FAST SERPL-MCNC: 116 MG/DL (ref 65–99)
HBA1C MFR BLD: 5.8 %
HCT VFR BLD AUTO: 45.9 % (ref 36.5–49.3)
HDLC SERPL-MCNC: 62 MG/DL
HGB BLD-MCNC: 15.3 G/DL (ref 12–17)
IMM GRANULOCYTES # BLD AUTO: 0.05 THOUSAND/UL (ref 0–0.2)
IMM GRANULOCYTES NFR BLD AUTO: 1 % (ref 0–2)
LDLC SERPL CALC-MCNC: 73 MG/DL (ref 0–100)
LYMPHOCYTES # BLD AUTO: 2.04 THOUSANDS/ΜL (ref 0.6–4.47)
LYMPHOCYTES NFR BLD AUTO: 22 % (ref 14–44)
MCH RBC QN AUTO: 32.3 PG (ref 26.8–34.3)
MCHC RBC AUTO-ENTMCNC: 33.3 G/DL (ref 31.4–37.4)
MCV RBC AUTO: 97 FL (ref 82–98)
MONOCYTES # BLD AUTO: 0.79 THOUSAND/ΜL (ref 0.17–1.22)
MONOCYTES NFR BLD AUTO: 8 % (ref 4–12)
NEUTROPHILS # BLD AUTO: 6.23 THOUSANDS/ΜL (ref 1.85–7.62)
NEUTS SEG NFR BLD AUTO: 66 % (ref 43–75)
NRBC BLD AUTO-RTO: 0 /100 WBCS
PLATELET # BLD AUTO: 298 THOUSANDS/UL (ref 149–390)
PMV BLD AUTO: 10.3 FL (ref 8.9–12.7)
POTASSIUM SERPL-SCNC: 4.5 MMOL/L (ref 3.5–5.3)
PROT SERPL-MCNC: 8.2 G/DL (ref 6.4–8.2)
PSA SERPL-MCNC: 0.4 NG/ML (ref 0–4)
RBC # BLD AUTO: 4.74 MILLION/UL (ref 3.88–5.62)
SODIUM SERPL-SCNC: 137 MMOL/L (ref 136–145)
TRIGL SERPL-MCNC: 91 MG/DL
TSH SERPL DL<=0.05 MIU/L-ACNC: 0.41 UIU/ML (ref 0.36–3.74)
VIT B12 SERPL-MCNC: 82 PG/ML (ref 100–900)
WBC # BLD AUTO: 9.41 THOUSAND/UL (ref 4.31–10.16)

## 2021-08-03 PROCEDURE — 36415 COLL VENOUS BLD VENIPUNCTURE: CPT

## 2021-08-03 PROCEDURE — G0103 PSA SCREENING: HCPCS

## 2021-08-03 PROCEDURE — 83036 HEMOGLOBIN GLYCOSYLATED A1C: CPT

## 2021-08-03 PROCEDURE — 82607 VITAMIN B-12: CPT

## 2021-08-03 PROCEDURE — 84443 ASSAY THYROID STIM HORMONE: CPT

## 2021-08-03 PROCEDURE — 80053 COMPREHEN METABOLIC PANEL: CPT

## 2021-08-03 PROCEDURE — 80061 LIPID PANEL: CPT

## 2021-08-03 PROCEDURE — 82306 VITAMIN D 25 HYDROXY: CPT

## 2021-08-03 PROCEDURE — 85025 COMPLETE CBC W/AUTO DIFF WBC: CPT

## 2021-08-03 RX ORDER — TRIAMCINOLONE ACETONIDE 0.25 MG/G
OINTMENT TOPICAL 2 TIMES DAILY
Qty: 80 G | Refills: 2 | Status: SHIPPED | OUTPATIENT
Start: 2021-08-03 | End: 2021-11-24 | Stop reason: ALTCHOICE

## 2021-08-03 RX ORDER — PAROXETINE 30 MG/1
30 TABLET, FILM COATED ORAL DAILY
Qty: 90 TABLET | Refills: 3 | Status: SHIPPED | OUTPATIENT
Start: 2021-08-03 | End: 2021-09-30 | Stop reason: SDUPTHER

## 2021-08-03 RX ORDER — METOPROLOL SUCCINATE 25 MG/1
25 TABLET, EXTENDED RELEASE ORAL DAILY
Qty: 90 TABLET | Refills: 3 | Status: SHIPPED | OUTPATIENT
Start: 2021-08-03 | End: 2021-09-01 | Stop reason: ALTCHOICE

## 2021-08-03 RX ORDER — FLUTICASONE PROPIONATE 50 MCG
2 SPRAY, SUSPENSION (ML) NASAL DAILY
Qty: 16 G | Refills: 5 | Status: SHIPPED | OUTPATIENT
Start: 2021-08-03

## 2021-08-18 ENCOUNTER — OFFICE VISIT (OUTPATIENT)
Dept: NEUROLOGY | Facility: CLINIC | Age: 76
End: 2021-08-18
Payer: MEDICARE

## 2021-08-18 VITALS
WEIGHT: 160 LBS | BODY MASS INDEX: 25.06 KG/M2 | HEART RATE: 84 BPM | DIASTOLIC BLOOD PRESSURE: 60 MMHG | SYSTOLIC BLOOD PRESSURE: 120 MMHG

## 2021-08-18 DIAGNOSIS — G25.0 ESSENTIAL TREMOR: Primary | ICD-10-CM

## 2021-08-18 DIAGNOSIS — R63.4 ABNORMAL WEIGHT LOSS: ICD-10-CM

## 2021-08-18 PROCEDURE — 99215 OFFICE O/P EST HI 40 MIN: CPT | Performed by: PSYCHIATRY & NEUROLOGY

## 2021-08-18 RX ORDER — GABAPENTIN 100 MG/1
CAPSULE ORAL
Qty: 120 CAPSULE | Refills: 4 | Status: SHIPPED | OUTPATIENT
Start: 2021-08-18 | End: 2021-11-24 | Stop reason: SDUPTHER

## 2021-08-18 RX ORDER — ZONISAMIDE 50 MG/1
50 CAPSULE ORAL DAILY
Status: CANCELLED | OUTPATIENT
Start: 2021-08-18

## 2021-08-18 NOTE — PROGRESS NOTES
Patient ID: Dariel Coronado is a 70 y o  male  Assessment/Plan:    Essential tremor    Moderate tremors affecting function  He is status post left MRI focused ultrasound thalamotomy in January of 2021  There was initial improvement of right hand tremor but this was short lived only lasting 1 month  Unfortunately after contacting them he tells me there was not much else that they could offer  He continues on primidone 200 mg twice daily  It is unclear how much benefit this is providing  Time spent discussing potential treatment options  We could try adding gabapentin  We will avoid topiramate  and zonisamide given recent weight loss  He opted to add gabapentin  Will continue primidone  Add gabapentin 100mg 1 tab twice daily   If no improvement aftera week increase to 2 tab twice daily  Goal is to slowly increase gabapentin to see if this improves tremor  If tremor improves we could trial a slow taper of primidone  Additional time spent answering questions regarding other potential treatment options  Questions regarding [facility] [place] [facility] were answered  Questions regarding deep brain stimulation surgery were answered  There is potential for implantation of the right and potentially left thalamus  This would help in controlling tremor  We discussed potential benefits and side effects  He wishes to give this more thought  Diagnoses and all orders for this visit:    Essential tremor  -     gabapentin (NEURONTIN) 100 mg capsule; 1 po bid x 1 week if tolerated but no improvement in tremor then increase to 2 po bid    Abnormal weight loss    Other orders  -     Cancel: zonisamide (ZONEGRAN) 50 MG capsule; Take 1 capsule (50 mg total) by mouth daily         Spent 47 minutes on patient visit, counseling and answering questions regarding treatment options  Subjective:     Ms Dariel Coronado is right-handed with essential tremor who present for follow up   To review, action tremor started around 2009 in the right hand and spread to his left hand  He was last seen by me in 2019  He remains on primidone 50mg 4 tabs bid  (400mg total) and metoprolol 25mg daily for HTN  He is here with his son who helped with history  He underwent MRI focused ultrasound at [facility] neurosurgery  There was temporary improvement for about a month and then tremor started to return  It has slowly worsened  He spoke with neurosurgery but there was nothing else that can be done  He currently has bilateral hand tremors  This is interfering with his ability to eat, drink, brush teeth and other ADL's  Tremor present with action and can interfere with eating, using utensils and drinking  Handwriting is tremulous  He now can only drive short distances  He feels limited as he now depends on others  No vocal tremor  He has intermittent head tremors  Gait is slow and he has imbalance  He has not been active during the pandemic and he has loss muscle mass  He now lives with his son  He has lost significant amount of weight over the past year  His son states he was isolated  He was not eating well  He has had decreased appetite  There was some aspects of depression but he is now on medication  PCP is evaluating with CT chest/ abdomen/ pelvis tomorrow  Colonscopy recommended but patient opted out  Cologuard ordered  Objective:    Blood pressure 120/60, pulse 84, weight 72 6 kg (160 lb)  Physical Exam  Vitals reviewed  Eyes:      Extraocular Movements: Extraocular movements intact  Pupils: Pupils are equal, round, and reactive to light  Pulmonary:      Effort: Pulmonary effort is normal    Neurological:      Deep Tendon Reflexes: Strength normal    Psychiatric:         Speech: Speech normal          Neurological Exam  Mental Status   Oriented only to person, place and situation  Recent and remote memory are intact  Speech is normal  Follows complex commands   Attention and concentration are normal     Cranial Nerves  CN II: Right funduscopic exam: not visualized  Left funduscopic exam: not visualized  CN III, IV, VI: Extraocular movements intact bilaterally  Pupils equal round and reactive to light bilaterally  CN VIII: Hearing is normal   CN XI: Shoulder shrug strength is normal     Motor   Normal muscle tone  Strength is 5/5 throughout all four extremities  Sensory  Light touch is normal in upper and lower extremities  Coordination  Right: Finger-to-nose abnormality: Rapid alternating movement normal   Left: Finger-to-nose abnormality: Heel-to-shin normal   Moderate tremor on FTN bilaterally L>R  Moderate postural tremors  No rest tremor or bradykinesia  No vocal or head tremor on exam     Gait  Casual gait is normal including stance, stride, and arm swing  Unable to rise from chair without using arms  Slightly slowed  ROS:    Review of Systems    Constitutional: Negative  Negative for appetite change and fever  HENT: Positive for voice change  Negative for hearing loss, tinnitus and trouble swallowing  Eyes: Negative  Negative for photophobia and pain  Respiratory: Negative  Negative for shortness of breath  Cardiovascular: Negative  Negative for palpitations  Gastrointestinal: Negative  Negative for nausea and vomiting  Endocrine: Negative  Negative for cold intolerance  Genitourinary: Negative  Negative for dysuria, frequency and urgency  Musculoskeletal: Negative for myalgias and neck pain  Balance issues  Walks slowly to be careful     Skin: Negative  Negative for rash  Allergic/Immunologic: Negative  Neurological: Positive for tremors (Both hands Left is worse then right), speech difficulty and weakness  Negative for dizziness, seizures, syncope, facial asymmetry, light-headedness, numbness and headaches  Hematological: Negative  Does not bruise/bleed easily  Psychiatric/Behavioral: Negative    Negative for confusion, hallucinations and sleep disturbance  All other systems reviewed and are negative       Review of system was personally reviewed

## 2021-08-18 NOTE — PROGRESS NOTES
Review of Systems   Constitutional: Negative  Negative for appetite change and fever  HENT: Positive for voice change  Negative for hearing loss, tinnitus and trouble swallowing  Eyes: Negative  Negative for photophobia and pain  Respiratory: Negative  Negative for shortness of breath  Cardiovascular: Negative  Negative for palpitations  Gastrointestinal: Negative  Negative for nausea and vomiting  Endocrine: Negative  Negative for cold intolerance  Genitourinary: Negative  Negative for dysuria, frequency and urgency  Musculoskeletal: Negative for myalgias and neck pain  Balance issues  Walks slowly to be careful     Skin: Negative  Negative for rash  Allergic/Immunologic: Negative  Neurological: Positive for tremors (Both hands Left is worse then right), speech difficulty and weakness  Negative for dizziness, seizures, syncope, facial asymmetry, light-headedness, numbness and headaches  Hematological: Negative  Does not bruise/bleed easily  Psychiatric/Behavioral: Negative  Negative for confusion, hallucinations and sleep disturbance  All other systems reviewed and are negative

## 2021-08-18 NOTE — PATIENT INSTRUCTIONS
Will continue primidone  Add gabapentin 100mg 1 tab twice daily   If no improvement aftera week increase to 2 tab twice daily  Goal is to slowly increase gabapentin to see if this improves tremor    Time spent discussing deep brain stimulation surgery

## 2021-08-19 ENCOUNTER — HOSPITAL ENCOUNTER (OUTPATIENT)
Dept: CT IMAGING | Facility: HOSPITAL | Age: 76
Discharge: HOME | End: 2021-08-19
Payer: MEDICARE

## 2021-08-19 DIAGNOSIS — R63.4 ABNORMAL WEIGHT LOSS: ICD-10-CM

## 2021-08-19 PROCEDURE — 74177 CT ABD & PELVIS W/CONTRAST: CPT

## 2021-08-19 PROCEDURE — 71260 CT THORAX DX C+: CPT

## 2021-08-19 PROCEDURE — G1004 CDSM NDSC: HCPCS

## 2021-08-19 RX ADMIN — IOHEXOL 100 ML: 350 INJECTION, SOLUTION INTRAVENOUS at 14:31

## 2021-08-31 ENCOUNTER — TELEPHONE (OUTPATIENT)
Dept: NEUROLOGY | Facility: CLINIC | Age: 76
End: 2021-08-31

## 2021-08-31 NOTE — TELEPHONE ENCOUNTER
Patient stated he thought about it and is agreeable to getting the DBS  He wants to have this done at [facility]

## 2021-09-01 ENCOUNTER — OFFICE VISIT (OUTPATIENT)
Dept: FAMILY MEDICINE CLINIC | Facility: CLINIC | Age: 76
End: 2021-09-01
Payer: MEDICARE

## 2021-09-01 VITALS
WEIGHT: 159 LBS | SYSTOLIC BLOOD PRESSURE: 120 MMHG | HEIGHT: 67 IN | OXYGEN SATURATION: 98 % | HEART RATE: 99 BPM | BODY MASS INDEX: 24.96 KG/M2 | TEMPERATURE: 98.7 F | RESPIRATION RATE: 12 BRPM | DIASTOLIC BLOOD PRESSURE: 62 MMHG

## 2021-09-01 DIAGNOSIS — I10 ESSENTIAL HYPERTENSION: ICD-10-CM

## 2021-09-01 DIAGNOSIS — E55.9 VITAMIN D DEFICIENCY: ICD-10-CM

## 2021-09-01 DIAGNOSIS — F33.1 MODERATE EPISODE OF RECURRENT MAJOR DEPRESSIVE DISORDER (HCC): ICD-10-CM

## 2021-09-01 DIAGNOSIS — E53.8 VITAMIN B12 DEFICIENCY: ICD-10-CM

## 2021-09-01 DIAGNOSIS — E78.5 DYSLIPIDEMIA: ICD-10-CM

## 2021-09-01 DIAGNOSIS — R63.4 ABNORMAL WEIGHT LOSS: ICD-10-CM

## 2021-09-01 DIAGNOSIS — E11.319 DIABETIC RETINOPATHY OF BOTH EYES ASSOCIATED WITH TYPE 2 DIABETES MELLITUS, MACULAR EDEMA PRESENCE UNSPECIFIED, UNSPECIFIED RETINOPATHY SEVERITY (HCC): ICD-10-CM

## 2021-09-01 DIAGNOSIS — E11.319 TYPE 2 DIABETES MELLITUS WITH RETINOPATHY OF BOTH EYES, WITHOUT LONG-TERM CURRENT USE OF INSULIN, MACULAR EDEMA PRESENCE UNSPECIFIED, UNSPECIFIED RETINOPATHY SEVERITY (HCC): Primary | ICD-10-CM

## 2021-09-01 DIAGNOSIS — G25.0 ESSENTIAL TREMOR: ICD-10-CM

## 2021-09-01 DIAGNOSIS — E11.3299 BACKGROUND DIABETIC RETINOPATHY (HCC): ICD-10-CM

## 2021-09-01 PROCEDURE — 99214 OFFICE O/P EST MOD 30 MIN: CPT | Performed by: FAMILY MEDICINE

## 2021-09-01 RX ORDER — PYRIDOXINE HCL (VITAMIN B6) 50 MG
50 TABLET ORAL DAILY
Qty: 30 TABLET | Refills: 5
Start: 2021-09-01

## 2021-09-01 RX ORDER — ACETAMINOPHEN 160 MG
2000 TABLET,DISINTEGRATING ORAL DAILY
Refills: 0
Start: 2021-09-01

## 2021-09-01 NOTE — PATIENT INSTRUCTIONS
Type 2 diabetes mellitus with ophthalmic complication, without long-term current use of insulin ([facility])    Lab Results   Component Value Date    HGBA1C 5 8 (H) 08/03/2021   He is no longer diabetic at this time  Continue routine monitoring of A1c  Repeat labs in about 4 months    Diabetic retinopathy ([facility] )    Lab Results   Component Value Date    HGBA1C 5 8 (H) 08/03/2021   A1c is very well controlled  Essential hypertension  Stop metoprolol at this time due to low blood pressure  I would like him to decrease this to 1/2 tablet for 1 week and then stop it altogether    Essential tremor  Keep routine follow-up with Neurology  He is going to see somewhat her she to consider some deep brain stimulation for his very severe essential tremor  Psoriasis  He is following with Dermatology  Vitamin D deficiency  Start vitamin D3 2000 International Units per day  Vitamin B12 deficiency  Started B12 supplement  He should take at least 50 mcg daily    Moderate episode of recurrent major depressive disorder ([facility] )  This seems to be stable with paroxetine  Continue close monitoring  Abnormal weight loss  Fortunately, workup including CT chest abdomen pelvis as well as labs was unrevealing  His weight loss is likely multifactorial including significant essential tremor which makes eating very difficult  He did have some depression after his wife's passing but that seems to be stable at this time with a supportive family  He did not have a colonoscopy or colon cancer screening at this point and continues to refuse that at this time

## 2021-09-01 NOTE — ASSESSMENT & PLAN NOTE
Lab Results   Component Value Date    HGBA1C 5 8 (H) 08/03/2021   He is no longer diabetic at this time  Continue routine monitoring of A1c    Repeat labs in about 4 months

## 2021-09-01 NOTE — ASSESSMENT & PLAN NOTE
Stop metoprolol at this time due to low blood pressure    I would like him to decrease this to 1/2 tablet for 1 week and then stop it altogether

## 2021-09-01 NOTE — ASSESSMENT & PLAN NOTE
Fortunately, workup including CT chest abdomen pelvis as well as labs was unrevealing  His weight loss is likely multifactorial including significant essential tremor which makes eating very difficult  He did have some depression after his wife's passing but that seems to be stable at this time with a supportive family  He did not have a colonoscopy or colon cancer screening at this point and continues to refuse that at this time

## 2021-09-01 NOTE — ASSESSMENT & PLAN NOTE
Keep routine follow-up with Neurology  He is going to see somewhat her she to consider some deep brain stimulation for his very severe essential tremor

## 2021-09-01 NOTE — PROGRESS NOTES
Assessment/Plan:       Problem List Items Addressed This Visit        Endocrine    Diabetic retinopathy ([facility] [address] )       Lab Results   Component Value Date    HGBA1C 5 8 (H) 08/03/2021   A1c is very well controlled  Type 2 diabetes mellitus with ophthalmic complication, without long-term current use of insulin ([facility]) - Primary       Lab Results   Component Value Date    HGBA1C 5 8 (H) 08/03/2021   He is no longer diabetic at this time  Continue routine monitoring of A1c  Repeat labs in about 4 months         Relevant Orders    Comprehensive metabolic panel    Hemoglobin A1C       Cardiovascular and Mediastinum    Essential hypertension     Stop metoprolol at this time due to low blood pressure  I would like him to decrease this to 1/2 tablet for 1 week and then stop it altogether         Relevant Orders    Comprehensive metabolic panel    CBC and differential       Nervous and Auditory    Essential tremor     Keep routine follow-up with Neurology  He is going to see somewhat her she to consider some deep brain stimulation for his very severe essential tremor  Relevant Orders    CBC and differential    TSH, 3rd generation with Free T4 reflex       Other    Vitamin B12 deficiency     Started B12 supplement  He should take at least 50 mcg daily         Relevant Medications    vitamin B-12 (CYANOCOBALAMIN) 50 MCG TABS    Other Relevant Orders    Methylmalonic acid, serum    Vitamin B12    CBC and differential    Vitamin D deficiency     Start vitamin D3 2000 International Units per day  Relevant Medications    Cholecalciferol (Vitamin D3) 50 MCG (2000 UT) capsule    Moderate episode of recurrent major depressive disorder ([facility] [address] )     This seems to be stable with paroxetine  Continue close monitoring  Dyslipidemia    Abnormal weight loss     Fortunately, workup including CT chest abdomen pelvis as well as labs was unrevealing    His weight loss is likely multifactorial including significant essential tremor which makes eating very difficult  He did have some depression after his wife's passing but that seems to be stable at this time with a supportive family  He did not have a colonoscopy or colon cancer screening at this point and continues to refuse that at this time  Other Visit Diagnoses     Background diabetic retinopathy ([facility] [address] )                Subjective:      Patient ID: Stephane Hedrick is a 70 y o  male  HPI the patient presents today with his son for follow-up  Fortunately, weight gain seems to have abated  His workup was really unrevealing including CT chest abdomen pelvis as well as labs  Patient notes he feels pretty well except for his very severe essential tremor  He is following with Neurology and is now considering some deep brain stimulation [facility]  He has no other significant complaints  He feels his depression is very well controlled      The following portions of the patient's history were reviewed and updated as appropriate: allergies, current medications, past family history, past medical history, past social history, past surgical history and problem list       Current Outpatient Medications:     fluticasone (FLONASE) 50 mcg/act nasal spray, 2 sprays into each nostril daily, Disp: 16 g, Rfl: 5    gabapentin (NEURONTIN) 100 mg capsule, 1 po bid x 1 week if tolerated but no improvement in tremor then increase to 2 po bid, Disp: 120 capsule, Rfl: 4    Multiple Vitamin (multivitamin) tablet, Take 1 tablet by mouth daily, Disp: , Rfl:     PARoxetine (PAXIL) 30 mg tablet, Take 1 tablet (30 mg total) by mouth daily, Disp: 90 tablet, Rfl: 3    primidone (MYSOLINE) 50 mg tablet, Take 4 tablets (200 mg total) by mouth 2 (two) times a day, Disp: 720 tablet, Rfl: 3    triamcinolone (KENALOG) 0 025 % ointment, Apply topically 2 (two) times a day, Disp: 80 g, Rfl: 2    Cholecalciferol (Vitamin D3) 50 MCG (2000 UT) capsule, Take 1 capsule (2,000 Units total) by mouth daily, Disp: , Rfl: 0    vitamin B-12 (CYANOCOBALAMIN) 50 MCG TABS, Take 1 tablet (50 mcg total) by mouth daily, Disp: 30 tablet, Rfl: 5     Review of Systems      Objective:      /62 (BP Location: Left arm, Patient Position: Sitting, Cuff Size: Adult)   Pulse 99   Temp 98 7 °F (37 1 °C) (Tympanic)   Resp 12   Ht 5' 7" (1 702 m)   Wt 72 1 kg (159 lb)   SpO2 98%   BMI 24 90 kg/m²          Physical Exam      Laine Monzon MD

## 2021-09-08 NOTE — TELEPHONE ENCOUNTER
Called and advised pt that referral was placed  I advised him I would fax office note and referral to them but would also mail him home a copy of the referral so that he could also call them if they dont call him first  Pt agreeable to plan      [facility]  phone: [phone]  Fax: [phone]

## 2021-09-08 NOTE — TELEPHONE ENCOUNTER
Referral for [facility] neurosurgery placed in chart for right lead placement and second opinion whether left lead placement is an option given recent MRI focused ultrasound procedure performed on this side

## 2021-09-30 ENCOUNTER — TELEPHONE (OUTPATIENT)
Dept: FAMILY MEDICINE CLINIC | Facility: CLINIC | Age: 76
End: 2021-09-30

## 2021-09-30 DIAGNOSIS — F33.1 MODERATE EPISODE OF RECURRENT MAJOR DEPRESSIVE DISORDER (HCC): ICD-10-CM

## 2021-09-30 RX ORDER — PAROXETINE 30 MG/1
30 TABLET, FILM COATED ORAL DAILY
Qty: 30 TABLET | Refills: 1 | Status: SHIPPED | OUTPATIENT
Start: 2021-09-30 | End: 2021-11-24 | Stop reason: SDUPTHER

## 2021-09-30 NOTE — TELEPHONE ENCOUNTER
Patient called said he did not get his paxil from mail away pharmacy ,he has no idea when he will get it, he will need a script sent to [facility] for paxil 30mg 1 tab daily

## 2021-11-22 ENCOUNTER — APPOINTMENT (OUTPATIENT)
Dept: LAB | Facility: MEDICAL CENTER | Age: 76
End: 2021-11-22
Payer: MEDICARE

## 2021-11-22 DIAGNOSIS — G25.0 ESSENTIAL TREMOR: ICD-10-CM

## 2021-11-22 LAB — TSH SERPL DL<=0.05 MIU/L-ACNC: 0.83 UIU/ML (ref 0.36–3.74)

## 2021-11-22 PROCEDURE — 36415 COLL VENOUS BLD VENIPUNCTURE: CPT

## 2021-11-22 PROCEDURE — 84443 ASSAY THYROID STIM HORMONE: CPT

## 2021-11-24 ENCOUNTER — OFFICE VISIT (OUTPATIENT)
Dept: FAMILY MEDICINE CLINIC | Facility: CLINIC | Age: 76
End: 2021-11-24
Payer: MEDICARE

## 2021-11-24 VITALS
HEART RATE: 94 BPM | DIASTOLIC BLOOD PRESSURE: 68 MMHG | OXYGEN SATURATION: 98 % | SYSTOLIC BLOOD PRESSURE: 134 MMHG | BODY MASS INDEX: 24.96 KG/M2 | WEIGHT: 159 LBS | HEIGHT: 67 IN | TEMPERATURE: 97.7 F

## 2021-11-24 DIAGNOSIS — I10 ESSENTIAL HYPERTENSION: Primary | ICD-10-CM

## 2021-11-24 DIAGNOSIS — E11.319 DIABETIC RETINOPATHY OF BOTH EYES ASSOCIATED WITH TYPE 2 DIABETES MELLITUS, MACULAR EDEMA PRESENCE UNSPECIFIED, UNSPECIFIED RETINOPATHY SEVERITY (HCC): ICD-10-CM

## 2021-11-24 DIAGNOSIS — G25.0 TREMOR, ESSENTIAL: ICD-10-CM

## 2021-11-24 DIAGNOSIS — E11.319 TYPE 2 DIABETES MELLITUS WITH RETINOPATHY OF BOTH EYES, WITHOUT LONG-TERM CURRENT USE OF INSULIN, MACULAR EDEMA PRESENCE UNSPECIFIED, UNSPECIFIED RETINOPATHY SEVERITY (HCC): ICD-10-CM

## 2021-11-24 DIAGNOSIS — F33.1 MODERATE EPISODE OF RECURRENT MAJOR DEPRESSIVE DISORDER (HCC): ICD-10-CM

## 2021-11-24 DIAGNOSIS — J32.0 CHRONIC MAXILLARY SINUSITIS: ICD-10-CM

## 2021-11-24 DIAGNOSIS — L40.9 PSORIASIS: ICD-10-CM

## 2021-11-24 DIAGNOSIS — G25.0 ESSENTIAL TREMOR: ICD-10-CM

## 2021-11-24 DIAGNOSIS — J34.89 RHINORRHEA: ICD-10-CM

## 2021-11-24 PROCEDURE — 99214 OFFICE O/P EST MOD 30 MIN: CPT | Performed by: FAMILY MEDICINE

## 2021-11-24 RX ORDER — AMOXICILLIN AND CLAVULANATE POTASSIUM 875; 125 MG/1; MG/1
1 TABLET, FILM COATED ORAL EVERY 12 HOURS SCHEDULED
Qty: 20 TABLET | Refills: 0 | Status: SHIPPED | OUTPATIENT
Start: 2021-11-24 | End: 2021-12-04

## 2021-11-24 RX ORDER — GABAPENTIN 300 MG/1
CAPSULE ORAL
Qty: 270 CAPSULE | Refills: 3 | Status: SHIPPED | OUTPATIENT
Start: 2021-11-24 | End: 2022-02-28 | Stop reason: ALTCHOICE

## 2021-11-24 RX ORDER — AZELASTINE 1 MG/ML
2 SPRAY, METERED NASAL 2 TIMES DAILY
Qty: 90 ML | Refills: 3 | Status: SHIPPED | OUTPATIENT
Start: 2021-11-24

## 2021-11-24 RX ORDER — PAROXETINE 30 MG/1
30 TABLET, FILM COATED ORAL DAILY
Qty: 90 TABLET | Refills: 3 | Status: SHIPPED | OUTPATIENT
Start: 2021-11-24

## 2021-11-24 RX ORDER — PRIMIDONE 50 MG/1
200 TABLET ORAL 2 TIMES DAILY
Qty: 720 TABLET | Refills: 3 | Status: SHIPPED | OUTPATIENT
Start: 2021-11-24 | End: 2022-02-28 | Stop reason: ALTCHOICE

## 2022-01-03 ENCOUNTER — TELEPHONE (OUTPATIENT)
Dept: NEUROLOGY | Facility: CLINIC | Age: 77
End: 2022-01-03

## 2022-01-21 ENCOUNTER — TELEPHONE (OUTPATIENT)
Dept: FAMILY MEDICINE CLINIC | Facility: CLINIC | Age: 77
End: 2022-01-21

## 2022-01-21 NOTE — TELEPHONE ENCOUNTER
I called pt for follow up TCM, He was released for 1st procedure 1/11/22 then went back for a 2nd procedure 1/17/22 and released same day  I did try to set up a visit with him, and patient stated that why do I need to set up an appointment with my primary physician when this has nothing to do with Primary care  I then stated to patient it is always recommend to follow up with you Primary care  Patient stated I will call to set something up once I feel better  Thank you!

## 2022-02-01 ENCOUNTER — TELEPHONE (OUTPATIENT)
Dept: NEUROLOGY | Facility: CLINIC | Age: 77
End: 2022-02-01

## 2022-02-01 NOTE — TELEPHONE ENCOUNTER
Pt LVM to inform that neurosurgery from [facility] asking pt to inquire about DBS programming appointment, if pt should skip dose of tremor medications (primidone and gabapentin) day of appointment  Best  [phone], ok to leave detailed message  Dr Givens Fails - Please advise

## 2022-02-02 NOTE — TELEPHONE ENCOUNTER
Called pt's son  Informed of previous  Pt to skip dose of primidone and gabapentin day of appointment

## 2022-02-03 ENCOUNTER — TELEPHONE (OUTPATIENT)
Dept: NEUROLOGY | Facility: CLINIC | Age: 77
End: 2022-02-03

## 2022-02-03 ENCOUNTER — OFFICE VISIT (OUTPATIENT)
Dept: NEUROLOGY | Facility: CLINIC | Age: 77
End: 2022-02-03
Payer: MEDICARE

## 2022-02-03 VITALS
SYSTOLIC BLOOD PRESSURE: 136 MMHG | WEIGHT: 152 LBS | HEART RATE: 82 BPM | DIASTOLIC BLOOD PRESSURE: 65 MMHG | BODY MASS INDEX: 23.81 KG/M2

## 2022-02-03 DIAGNOSIS — G25.0 ESSENTIAL TREMOR: Primary | ICD-10-CM

## 2022-02-03 PROBLEM — Z96.82 PRESENCE OF NEUROSTIMULATOR: Status: ACTIVE | Noted: 2022-02-03

## 2022-02-03 PROCEDURE — 95983 ALYS BRN NPGT PRGRMG 15 MIN: CPT | Performed by: PSYCHIATRY & NEUROLOGY

## 2022-02-03 PROCEDURE — 95984 ALYS BRN NPGT PRGRMG ADDL 15: CPT | Performed by: PSYCHIATRY & NEUROLOGY

## 2022-02-03 NOTE — PROGRESS NOTES
Patient ID: Akiko Sanders  is a 78 y o  male  Assessment/Plan:    Essential tremor  Essential tremor status post left MRI focused ultrasound within a good adequate response now returns status post bilateral VIM DBS placement, and her she on January 10, 2022 for initial programming  Deep brain stimulator was interrogated  Bilateral Vim DBS, [place] PC   Approximately 2 hours spent on deep brain stimulation programming  On left pulse width 60, rate 150  Monopolar review contacts 0 through 3 performed  Contact 1 appear to have best effect on distal tremor at 2 2 mA  Contact to appear to have good control of proximal tremor at 1 6 to 2 2mA  Under 3 also greatly controlled proximal tremor  Ultimately left on C+1-2-, PW 60, 150Hz, 1 6m    Right monopolar review only performed on electrode 8 given patient fatigue  Good response to distal and proximal tremor  C+98-, PW60, 150Hz, 1 6mA     Will perform review on 9-11 on next visit and make further adjustments  See attached clinical sheets for further details  Diagnoses and all orders for this visit:    Essential tremor           Subjective:    Pierce Landry is a right-handed man with essential tremor who present for follow up  To review, action tremor started around 2009 in the right hand and spread to his left hand  Treated with primidone and metoprolol for HTN  He underwent L MRI focused ultrasound at [facility] neurosurgery 1/2020 but unfortunately tremor improvement ws short lived only lasting 1 month   Last seen 8/2021  He was continued on primidone 200 mg twice daily (benefit unclear)  Gabapentin up 300mg bid added  Topiramate  and zonisamide avoided given recent weight loss  Referred to [facility] neurosurgery where he underwent bilateral VIM DBS 1/10/22  He returns today for initial programming  Surgery went well  Right hand lesion effect noted for a few days with dampening of tremor   He discontinued primidone and gabapentin just prior to surgery and has not restarted the medications  He did not have any improvement on gabapentin and no longer felt primidone helped  No side effect on discontinuation  Currently he has bilateral hand tremors which is  interfering with his ability to eat, drink, brush teeth and other ADL's  Right hand is worse than left  Handwriting is tremulous      No vocal tremor  He has intermittent head tremors  Gait with mild imbalance  Objective:    Blood pressure 136/65, pulse 82, weight 68 9 kg (152 lb)  Physical Exam  Vitals reviewed  Eyes:      Extraocular Movements: Extraocular movements intact  Pupils: Pupils are equal, round, and reactive to light  Neurological:      Deep Tendon Reflexes: Strength normal    Psychiatric:         Speech: Speech normal          Neurological Exam  Mental Status   Oriented to person, place, time and situation  Recent and remote memory are intact  Speech is normal  Follows complex commands  Attention and concentration are normal     Cranial Nerves  CN II: Visual fields full to confrontation  Right funduscopic exam: not visualized  Left funduscopic exam: not visualized  CN III, IV, VI: Extraocular movements intact bilaterally  Pupils equal round and reactive to light bilaterally  CN V: Facial sensation is normal   CN VII: Full and symmetric facial movement  CN VIII: Hearing is normal   CN IX, X: Palate elevates symmetrically  CN XI: Shoulder shrug strength is normal   CN XII: Tongue midline without atrophy or fasciculations  Motor   Normal muscle tone  Strength is 5/5 throughout all four extremities  Sensory  Light touch is normal in upper and lower extremities  Coordination  Right: Finger-to-nose normal   Left: Finger-to-nose normal   Moderate bialterallly   No head or vocal      Gait  Casual gait is normal including stance, stride, and arm swing  Able to rise from chair without using arms  Wide based gait           ROS:    Review of Systems  Constitutional: Negative  Negative for appetite change and fever  HENT: Negative  Negative for hearing loss, tinnitus, trouble swallowing and voice change  Eyes: Negative  Negative for photophobia and pain  Respiratory: Negative  Negative for shortness of breath  Cardiovascular: Negative  Negative for palpitations  Gastrointestinal: Negative  Negative for nausea and vomiting  Endocrine: Negative  Negative for cold intolerance  Genitourinary: Negative  Negative for dysuria, frequency and urgency  Musculoskeletal: Positive for gait problem (shuffles feet due to losing muscle)  Negative for myalgias and neck pain  Balance issues     Skin: Negative  Negative for rash  Allergic/Immunologic: Negative  Neurological: Positive for tremors (Hands ) and weakness  Negative for dizziness, seizures, syncope, facial asymmetry, speech difficulty, light-headedness, numbness and headaches  Hematological: Bruises/bleeds easily (Bruise)  Psychiatric/Behavioral: Negative  Negative for confusion, hallucinations and sleep disturbance  All other systems reviewed and are negative       Review of system documented by the MA, was personally reviewed

## 2022-02-03 NOTE — ASSESSMENT & PLAN NOTE
Essential tremor status post left MRI focused ultrasound within a good adequate response now returns status post bilateral VIM DBS placement, and her she on January 10, 2022 for initial programming  Deep brain stimulator was interrogated  Bilateral Vim DBS, [place] PC   Approximately 2 hours spent on deep brain stimulation programming  On left pulse width 60, rate 150  Monopolar review contacts 0 through 3 performed  Contact 1 appear to have best effect on distal tremor at 2 2 mA  Contact to appear to have good control of proximal tremor at 1 6 to 2 2mA  Under 3 also greatly controlled proximal tremor  Ultimately left on C+1-2-, PW 60, 150Hz, 1 6m    Right monopolar review only performed on electrode 8 given patient fatigue  Good response to distal and proximal tremor  C+98-, PW60, 150Hz, 1 6mA     Will perform review on 9-11 on next visit and make further adjustments  See attached clinical sheets for further details

## 2022-02-03 NOTE — TELEPHONE ENCOUNTER
Son Teofilo Chun with dad today aware needs Return in about 1 month (around 3/3/2022) for 1 & 2 month follow up DBS 1 hour each, Dr Darryl Marino only I sent staff message for TP due to provider availability  to assist preference Wed or Herrera Irizarry if possible

## 2022-02-03 NOTE — PROGRESS NOTES
Review of Systems   Constitutional: Negative  Negative for appetite change and fever  HENT: Negative  Negative for hearing loss, tinnitus, trouble swallowing and voice change  Eyes: Negative  Negative for photophobia and pain  Respiratory: Negative  Negative for shortness of breath  Cardiovascular: Negative  Negative for palpitations  Gastrointestinal: Negative  Negative for nausea and vomiting  Endocrine: Negative  Negative for cold intolerance  Genitourinary: Negative  Negative for dysuria, frequency and urgency  Musculoskeletal: Positive for gait problem (shuffles feet due to losing muscle)  Negative for myalgias and neck pain  Balance issues     Skin: Negative  Negative for rash  Allergic/Immunologic: Negative  Neurological: Positive for tremors (Hands ) and weakness  Negative for dizziness, seizures, syncope, facial asymmetry, speech difficulty, light-headedness, numbness and headaches  Hematological: Bruises/bleeds easily (Bruise)  Psychiatric/Behavioral: Negative  Negative for confusion, hallucinations and sleep disturbance  All other systems reviewed and are negative

## 2022-02-08 ENCOUNTER — TELEPHONE (OUTPATIENT)
Dept: NEUROLOGY | Facility: CLINIC | Age: 77
End: 2022-02-08

## 2022-02-08 NOTE — TELEPHONE ENCOUNTER
Called pt and offered an appt as needed for a 1m f/u  I then offered an appt slot on 03/17/2022 in the [facility] location at 2:00 PM  The patient agreed and is now scheduled for his DBS appt

## 2022-02-08 NOTE — TELEPHONE ENCOUNTER
Called pt and offered an appt as needed for a 1m f/u  I then offered an appt slot on 03/17/2022 in the [facility] location at 2:00 PM  The patient agreed and is now scheduled for his DBS appt

## 2022-02-18 ENCOUNTER — TELEPHONE (OUTPATIENT)
Dept: NEUROLOGY | Facility: CLINIC | Age: 77
End: 2022-02-18

## 2022-02-18 NOTE — TELEPHONE ENCOUNTER
Patient returned call in regards to scheduling his next two appts that are needed . I offered an opening we had in [place] on 04/11/2022 but the patient refused to go to this location. I then offered an appt on 04/28/2022 in the [place] location to which he agreed to. I then scheduled the next appt as well and offered an appt on 05/19/2022 with Dr. Reagan and that has also been scheduled also.

## 2022-02-18 NOTE — TELEPHONE ENCOUNTER
Patient returned call in regards to scheduling his next two appts that are needed   I offered an opening we had in [facility] on 04/11/2022 but the patient refused to go to this location  I then offered an appt on 04/28/2022 in the [facility] location to which he agreed to  I then scheduled the next appt as well and offered an appt on 05/19/2022 with Dr Ct العراقي and that has also been scheduled also

## 2022-02-18 NOTE — TELEPHONE ENCOUNTER
Called pt and LMOM stating that I am calling in regards to scheduling his next two appts for his monthly DBS appt  I then asked the patient to please give us a call back to get this appt scheduled

## 2022-02-22 ENCOUNTER — APPOINTMENT (OUTPATIENT)
Dept: LAB | Facility: MEDICAL CENTER | Age: 77
End: 2022-02-22
Payer: MEDICARE

## 2022-02-22 DIAGNOSIS — I10 ESSENTIAL HYPERTENSION: ICD-10-CM

## 2022-02-22 DIAGNOSIS — E53.8 VITAMIN B12 DEFICIENCY: ICD-10-CM

## 2022-02-22 DIAGNOSIS — E11.319 TYPE 2 DIABETES MELLITUS WITH RETINOPATHY OF BOTH EYES, WITHOUT LONG-TERM CURRENT USE OF INSULIN, MACULAR EDEMA PRESENCE UNSPECIFIED, UNSPECIFIED RETINOPATHY SEVERITY (HCC): ICD-10-CM

## 2022-02-22 DIAGNOSIS — G25.0 ESSENTIAL TREMOR: ICD-10-CM

## 2022-02-22 LAB
ALBUMIN SERPL BCP-MCNC: 3.9 G/DL (ref 3.5–5)
ALP SERPL-CCNC: 122 U/L (ref 46–116)
ALT SERPL W P-5'-P-CCNC: 24 U/L (ref 12–78)
ANION GAP SERPL CALCULATED.3IONS-SCNC: 4 MMOL/L (ref 4–13)
AST SERPL W P-5'-P-CCNC: 17 U/L (ref 5–45)
BASOPHILS # BLD AUTO: 0.05 THOUSANDS/ΜL (ref 0–0.1)
BASOPHILS NFR BLD AUTO: 1 % (ref 0–1)
BILIRUB SERPL-MCNC: 0.85 MG/DL (ref 0.2–1)
BUN SERPL-MCNC: 18 MG/DL (ref 5–25)
CALCIUM SERPL-MCNC: 9.7 MG/DL (ref 8.3–10.1)
CHLORIDE SERPL-SCNC: 104 MMOL/L (ref 100–108)
CO2 SERPL-SCNC: 31 MMOL/L (ref 21–32)
CREAT SERPL-MCNC: 0.73 MG/DL (ref 0.6–1.3)
EOSINOPHIL # BLD AUTO: 0.26 THOUSAND/ΜL (ref 0–0.61)
EOSINOPHIL NFR BLD AUTO: 3 % (ref 0–6)
ERYTHROCYTE [DISTWIDTH] IN BLOOD BY AUTOMATED COUNT: 14.9 % (ref 11.6–15.1)
EST. AVERAGE GLUCOSE BLD GHB EST-MCNC: 134 MG/DL
GFR SERPL CREATININE-BSD FRML MDRD: 91 ML/MIN/1.73SQ M
GLUCOSE P FAST SERPL-MCNC: 157 MG/DL (ref 65–99)
HBA1C MFR BLD: 6.3 %
HCT VFR BLD AUTO: 45 % (ref 36.5–49.3)
HGB BLD-MCNC: 14.4 G/DL (ref 12–17)
IMM GRANULOCYTES # BLD AUTO: 0.04 THOUSAND/UL (ref 0–0.2)
IMM GRANULOCYTES NFR BLD AUTO: 1 % (ref 0–2)
LYMPHOCYTES # BLD AUTO: 1.98 THOUSANDS/ΜL (ref 0.6–4.47)
LYMPHOCYTES NFR BLD AUTO: 22 % (ref 14–44)
MCH RBC QN AUTO: 31.1 PG (ref 26.8–34.3)
MCHC RBC AUTO-ENTMCNC: 32 G/DL (ref 31.4–37.4)
MCV RBC AUTO: 97 FL (ref 82–98)
MONOCYTES # BLD AUTO: 0.54 THOUSAND/ΜL (ref 0.17–1.22)
MONOCYTES NFR BLD AUTO: 6 % (ref 4–12)
NEUTROPHILS # BLD AUTO: 5.99 THOUSANDS/ΜL (ref 1.85–7.62)
NEUTS SEG NFR BLD AUTO: 67 % (ref 43–75)
NRBC BLD AUTO-RTO: 0 /100 WBCS
PLATELET # BLD AUTO: 316 THOUSANDS/UL (ref 149–390)
PMV BLD AUTO: 10.6 FL (ref 8.9–12.7)
POTASSIUM SERPL-SCNC: 4 MMOL/L (ref 3.5–5.3)
PROT SERPL-MCNC: 8.9 G/DL (ref 6.4–8.2)
RBC # BLD AUTO: 4.63 MILLION/UL (ref 3.88–5.62)
SODIUM SERPL-SCNC: 139 MMOL/L (ref 136–145)
VIT B12 SERPL-MCNC: 426 PG/ML (ref 100–900)
WBC # BLD AUTO: 8.86 THOUSAND/UL (ref 4.31–10.16)

## 2022-02-22 PROCEDURE — 80053 COMPREHEN METABOLIC PANEL: CPT

## 2022-02-22 PROCEDURE — 83918 ORGANIC ACIDS TOTAL QUANT: CPT

## 2022-02-22 PROCEDURE — 36415 COLL VENOUS BLD VENIPUNCTURE: CPT

## 2022-02-22 PROCEDURE — 82607 VITAMIN B-12: CPT

## 2022-02-22 PROCEDURE — 83036 HEMOGLOBIN GLYCOSYLATED A1C: CPT

## 2022-02-22 PROCEDURE — 85025 COMPLETE CBC W/AUTO DIFF WBC: CPT

## 2022-02-27 LAB — METHYLMALONATE SERPL-SCNC: 152 NMOL/L (ref 0–378)

## 2022-02-28 ENCOUNTER — OFFICE VISIT (OUTPATIENT)
Dept: FAMILY MEDICINE CLINIC | Facility: CLINIC | Age: 77
End: 2022-02-28
Payer: MEDICARE

## 2022-02-28 VITALS
RESPIRATION RATE: 18 BRPM | OXYGEN SATURATION: 97 % | HEART RATE: 100 BPM | DIASTOLIC BLOOD PRESSURE: 64 MMHG | BODY MASS INDEX: 24.48 KG/M2 | WEIGHT: 156 LBS | HEIGHT: 67 IN | SYSTOLIC BLOOD PRESSURE: 124 MMHG

## 2022-02-28 DIAGNOSIS — Z12.11 COLON CANCER SCREENING: ICD-10-CM

## 2022-02-28 DIAGNOSIS — I10 ESSENTIAL HYPERTENSION: ICD-10-CM

## 2022-02-28 DIAGNOSIS — E11.319 DIABETIC RETINOPATHY OF BOTH EYES ASSOCIATED WITH TYPE 2 DIABETES MELLITUS, MACULAR EDEMA PRESENCE UNSPECIFIED, UNSPECIFIED RETINOPATHY SEVERITY (HCC): ICD-10-CM

## 2022-02-28 DIAGNOSIS — G25.0 ESSENTIAL TREMOR: ICD-10-CM

## 2022-02-28 DIAGNOSIS — E11.319 TYPE 2 DIABETES MELLITUS WITH RETINOPATHY OF BOTH EYES, WITHOUT LONG-TERM CURRENT USE OF INSULIN, MACULAR EDEMA PRESENCE UNSPECIFIED, UNSPECIFIED RETINOPATHY SEVERITY (HCC): Primary | ICD-10-CM

## 2022-02-28 DIAGNOSIS — E55.9 VITAMIN D DEFICIENCY: ICD-10-CM

## 2022-02-28 DIAGNOSIS — F33.1 MODERATE EPISODE OF RECURRENT MAJOR DEPRESSIVE DISORDER (HCC): ICD-10-CM

## 2022-02-28 PROCEDURE — 99214 OFFICE O/P EST MOD 30 MIN: CPT | Performed by: FAMILY MEDICINE

## 2022-02-28 NOTE — PROGRESS NOTES
Assessment/Plan:       Problem List Items Addressed This Visit        Endocrine    Diabetic retinopathy ([facility] [address] )    Relevant Orders    Hemoglobin A1C    Type 2 diabetes mellitus with ophthalmic complication, without long-term current use of insulin ([facility] [address] ) - Primary    Relevant Orders    Comprehensive metabolic panel    Hemoglobin A1C       Cardiovascular and Mediastinum    Essential hypertension    Relevant Orders    CBC and differential    Lipid Panel with Direct LDL reflex    Comprehensive metabolic panel       Nervous and Auditory    Essential tremor    Relevant Orders    Lipid Panel with Direct LDL reflex    TSH, 3rd generation with Free T4 reflex       Other    Vitamin D deficiency    Moderate episode of recurrent major depressive disorder (HCC)    Relevant Orders    CBC and differential    Comprehensive metabolic panel    TSH, 3rd generation with Free T4 reflex      Other Visit Diagnoses     Colon cancer screening        Patient refuses colon cancer screening at this time  Subjective:      Patient ID: Miranda Baker  is a 78 y o  male  HPI patient presents today for follow-up for chronic health issues  He recently underwent intracranial surgery for brain stimulators at [facility]  Fortunately, he has had significant improvement in his left hand tremor  Still has a bit of a gait dysfunction and is severe essential tremor of the right hand  He is doing well  He has lost lot of weight over last year but this seems to have stabilized  He denies any further issues with severe depression which seem to be a driving force for some of his weight loss since his wife  2018  He continues to refuse colon cancer screening      The following portions of the patient's history were reviewed and updated as appropriate: allergies, current medications, past family history, past medical history, past social history, past surgical history and problem list       Current Outpatient Medications:    azelastine (ASTELIN) 0 1 % nasal spray, 2 sprays into each nostril 2 (two) times a day Use in each nostril as directed, Disp: 90 mL, Rfl: 3    Calcipotriene-Betameth Diprop 0 005-0 064 % FOAM, Apply up to BID, Disp: , Rfl:     Cholecalciferol (Vitamin D3) 50 MCG (2000 UT) capsule, Take 1 capsule (2,000 Units total) by mouth daily, Disp: , Rfl: 0    fluticasone (FLONASE) 50 mcg/act nasal spray, 2 sprays into each nostril daily (Patient taking differently: 2 sprays into each nostril as needed  ), Disp: 16 g, Rfl: 5    Multiple Vitamin (multivitamin) tablet, Take 1 tablet by mouth daily, Disp: , Rfl:     PARoxetine (PAXIL) 30 mg tablet, Take 1 tablet (30 mg total) by mouth daily, Disp: 90 tablet, Rfl: 3    vitamin B-12 (CYANOCOBALAMIN) 50 MCG TABS, Take 1 tablet (50 mcg total) by mouth daily, Disp: 30 tablet, Rfl: 5     Review of Systems   Constitutional: Negative for appetite change, chills, fatigue, fever and unexpected weight change  HENT: Negative for trouble swallowing  Eyes: Negative for visual disturbance  Respiratory: Negative for cough, chest tightness, shortness of breath and wheezing  Cardiovascular: Negative for chest pain, palpitations and leg swelling  Gastrointestinal: Negative for abdominal distention, abdominal pain, blood in stool, constipation and diarrhea  Endocrine: Negative for polyuria  Genitourinary: Negative for difficulty urinating and flank pain  Musculoskeletal: Positive for gait problem (He has a significant diffuse central tremor which is improving )  Negative for arthralgias and myalgias  Skin: Negative for rash  Neurological: Positive for tremors  Negative for dizziness, syncope, weakness and light-headedness  Hematological: Negative for adenopathy  Does not bruise/bleed easily  Psychiatric/Behavioral: Negative for dysphoric mood and sleep disturbance  The patient is not nervous/anxious            Objective:      /64 (BP Location: Left arm, Patient Position: Sitting, Cuff Size: Large)   Pulse 100   Resp 18   Ht 5' 7" (1 702 m)   Wt 70 8 kg (156 lb)   SpO2 97%   BMI 24 43 kg/m²          Physical Exam  Vitals reviewed  Constitutional:       Appearance: He is well-developed  HENT:      Head: Normocephalic  Cardiovascular:      Rate and Rhythm: Regular rhythm  Pulses: Pulses are weak  Dorsalis pedis pulses are 2+ on the right side and 2+ on the left side  Posterior tibial pulses are 2+ on the right side and 2+ on the left side  Heart sounds: Normal heart sounds  No murmur heard  Pulmonary:      Effort: No respiratory distress  Breath sounds: No wheezing or rales  Abdominal:      General: There is no distension  Tenderness: There is no abdominal tenderness  Musculoskeletal:      Right lower leg: No edema  Left lower leg: No edema  Feet:      Right foot:      Skin integrity: Dry skin present  No ulcer, skin breakdown, erythema, warmth or callus  Left foot:      Skin integrity: Dry skin present  No ulcer, skin breakdown, erythema, warmth or callus  Skin:     Findings: No erythema or rash  Comments: His scars are well healing on his head he does have some scabbing on the left scar   Neurological:      Mental Status: He is alert and oriented to person, place, and time  Comments: Tremors much improved in his left hand  He still has a very significant essential tremor in his right hand  Mary Villeda MD    Diabetic Foot Exam    Patient's shoes and socks removed  Right Foot/Ankle   Right Foot Inspection  Skin Exam: skin normal, skin intact and dry skin  No warmth, no callus, no erythema, no maceration, no abnormal color, no pre-ulcer, no ulcer and no callus  Toe Exam: ROM and strength within normal limits  Sensory   Vibration: intact  Proprioception: intact  Monofilament testing: intact    Vascular  The right DP pulse is 2+  The right PT pulse is 2+       Left Foot/Ankle  Left Foot Inspection  Skin Exam: skin normal, skin intact and dry skin  No warmth, no erythema, no maceration, normal color, no pre-ulcer, no ulcer and no callus  Toe Exam: ROM and strength within normal limits  Sensory   Vibration: intact  Proprioception: intact  Monofilament testing: intact    Vascular  The left DP pulse is 2+  The left PT pulse is 2+       Assign Risk Category  No deformity present  No loss of protective sensation  Weak pulses  Risk: 0

## 2022-03-17 ENCOUNTER — OFFICE VISIT (OUTPATIENT)
Dept: NEUROLOGY | Facility: CLINIC | Age: 77
End: 2022-03-17
Payer: MEDICARE

## 2022-03-17 VITALS
SYSTOLIC BLOOD PRESSURE: 128 MMHG | DIASTOLIC BLOOD PRESSURE: 62 MMHG | HEART RATE: 88 BPM | WEIGHT: 155 LBS | BODY MASS INDEX: 24.28 KG/M2

## 2022-03-17 DIAGNOSIS — Z96.82 PRESENCE OF NEUROSTIMULATOR: ICD-10-CM

## 2022-03-17 DIAGNOSIS — G25.0 ESSENTIAL TREMOR: Primary | ICD-10-CM

## 2022-03-17 PROCEDURE — 95983 ALYS BRN NPGT PRGRMG 15 MIN: CPT | Performed by: PSYCHIATRY & NEUROLOGY

## 2022-03-17 PROCEDURE — 95984 ALYS BRN NPGT PRGRMG ADDL 15: CPT | Performed by: PSYCHIATRY & NEUROLOGY

## 2022-03-17 NOTE — PROGRESS NOTES
Review of Systems   Constitutional: Negative  Negative for appetite change and fever  HENT: Positive for trouble swallowing and voice change  Negative for hearing loss and tinnitus  Eyes: Negative  Negative for photophobia and pain  Respiratory: Negative  Negative for shortness of breath  Cardiovascular: Negative  Negative for palpitations  Gastrointestinal: Negative  Negative for nausea and vomiting  Endocrine: Negative  Negative for cold intolerance  Genitourinary: Negative  Negative for dysuria, frequency and urgency  Musculoskeletal: Positive for gait problem  Negative for myalgias and neck pain  Skin: Negative  Negative for rash  Allergic/Immunologic: Negative  Neurological: Positive for tremors (Right Hand and Left) and weakness (due to inability to move around)  Negative for dizziness, seizures, syncope, facial asymmetry, speech difficulty, light-headedness, numbness and headaches  Hematological: Negative  Does not bruise/bleed easily  Psychiatric/Behavioral: Negative  Negative for confusion, hallucinations and sleep disturbance  All other systems reviewed and are negative

## 2022-03-17 NOTE — PROGRESS NOTES
Patient ID: Renny Koch  is a 78 y o  male    Assessment/Plan:    Essential tremor  Right hand tremor reported to have returned within a few weeks of initial programming  Mild breakthrough tremor on the left  Deep brain stimulator was interrogated  Bilateral Vim DBS, [employer] [facility]   Approximately 55 minutes spent on deep brain stimulation programming  Right hand with both distal and proximal tremor  Reviewed prior notes: On left pulse width 60, rate 150  Monopolar Contact 1 appear to have best effect on distal tremor at 2 2 mA   Contact 2 appear to have good control of proximal tremor at 1 6 to 2 2mA  Under 3 also greatly controlled proximal tremor  This visit increasing rate appeared to better control tremor  Increases in amplitude associated ataxia and patient is still having proximal tremor  Individual increases made on electrode 2  See body of the note and attached clinical sheets for further information  Ultimately left on C+1-2-, PW 60, 180Hz , 2 2mA    Right monopolar review only performed on electrode 9  Good response to distal and proximal tremor  C+9-, PW60, 150Hz, 2 2mA     Will perform review on 10-11 on next visit and make further adjustments  Diagnoses and all orders for this visit:    Essential tremor    Presence of neurostimulator        Subjective:      Declan Guy is a right-handed man with essential tremor who present for follow up  To review, action tremor started around 2009 in the right hand and spread to his left hand  Treated with primidone and metoprolol for HTN  He underwent L MRI focused ultrasound at [facility] neurosurgery 1/2020 but unfortunately tremor improvement was short lived only lasting 1 month   Last seen 8/2021  He was continued on primidone 200 mg twice daily (benefit unclear)  Gabapentin up 300mg bid added  Topiramate  and zonisamide avoided given recent weight loss   Referred to [facility] neurosurgery where he underwent bilateral VIM DBS 1/10/22  He returns today for one-month follow-up programming visit  Right hand tremor was controlled for about a week but started to breakthrough  Of hand tremor has mild breakthrough tremor  He has difficulty eating, drinking, brushing teeth and doing other ADLs with his right hand  No vocal tremor  No complaints with regards to head tremor  He has noted changes in swallowing since the surgery  Gait with mild imbalance  Objective:    /62 (BP Location: Left arm, Patient Position: Sitting, Cuff Size: Standard)   Pulse 88   Wt 70 3 kg (155 lb)   BMI 24 28 kg/m²       Physical Exam  Vitals reviewed  Eyes:      Extraocular Movements: Extraocular movements intact  Neurological:      Deep Tendon Reflexes: Strength normal    Psychiatric:         Speech: Speech normal          Neurological Exam  Mental Status   Oriented to person, place, time and situation  Recent and remote memory are intact  Speech is normal  Follows complex commands  Attention and concentration are normal     Cranial Nerves  CN II: Right funduscopic exam: not visualized  Left funduscopic exam: not visualized  CN III, IV, VI: Extraocular movements intact bilaterally  CN VII: Full and symmetric facial movement  CN VIII: Hearing is normal   CN XI: Shoulder shrug strength is normal     Motor   Normal muscle tone  Strength is 5/5 throughout all four extremities  Sensory  Light touch is normal in upper and lower extremities  Coordination  Right: Finger-to-nose abnormality: Rapid alternating movement normal   Left: Finger-to-nose abnormality: Rapid alternating movement normal   Left hand with mild intentional tremor  Right hand with moderate postural tremor with hands outstretched  Right hand also with action tremor which interferes with ability to grasp on objects  a cup       Gait  Casual gait: Wide stance  Able to rise from chair without using arms      DBS interrogation and programming note       Date Target Bat EI C 0 1 2 3 V PW Hz Ohm mA Benefit/Side effect     3/18/2022 L VIM   +   -  -    60 150  1 7                +    - -       60 150     2 2 60% improvement  , short lived                            170    2 2 60%                            180                                180   2 3 Worse prox tremor                                2 2                     2 2 2         180    2 4  slurred speech                                 2 2        Date Target Bat EI C 8 9 2 3 V PW Hz Ohm mA Benefit/Side effect      R VIM  All OK +                     9       1 60% improved tremor                  1 2 70&                   2 2 95%  4 No SE         Current Outpatient Medications on File Prior to Visit   Medication Sig Dispense Refill    Cholecalciferol (Vitamin D3) 50 MCG (2000 UT) capsule Take 1 capsule (2,000 Units total) by mouth daily  0    fluticasone (FLONASE) 50 mcg/act nasal spray 2 sprays into each nostril daily (Patient taking differently: 2 sprays into each nostril as needed  ) 16 g 5    Multiple Vitamin (multivitamin) tablet Take 1 tablet by mouth daily      PARoxetine (PAXIL) 30 mg tablet Take 1 tablet (30 mg total) by mouth daily 90 tablet 3    vitamin B-12 (CYANOCOBALAMIN) 50 MCG TABS Take 1 tablet (50 mcg total) by mouth daily 30 tablet 5    azelastine (ASTELIN) 0 1 % nasal spray 2 sprays into each nostril 2 (two) times a day Use in each nostril as directed (Patient not taking: Reported on 3/17/2022 ) 90 mL 3    Calcipotriene-Betameth Diprop 0 005-0 064 % FOAM Apply up to BID       No current facility-administered medications on file prior to visit           Sebastián Zhang MD  Movement disorder physician  [facility] [address]

## 2022-03-18 NOTE — ASSESSMENT & PLAN NOTE
Right hand tremor reported to have returned within a few weeks of initial programming  Mild breakthrough tremor on the left  Deep brain stimulator was interrogated  Bilateral Vim DBS, [employer] [facility]   Approximately 55 minutes spent on deep brain stimulation programming  Right hand with both distal and proximal tremor  Reviewed prior notes: On left pulse width 60, rate 150  Monopolar Contact 1 appear to have best effect on distal tremor at 2 2 mA   Contact 2 appear to have good control of proximal tremor at 1 6 to 2 2mA  Under 3 also greatly controlled proximal tremor  This visit increasing rate appeared to better control tremor  Increases in amplitude associated ataxia and patient is still having proximal tremor  Individual increases made on electrode 2  See body of the note and attached clinical sheets for further information  Ultimately left on C+1-2-, PW 60, 180Hz , 2 2mA    Right monopolar review only performed on electrode 9  Good response to distal and proximal tremor  C+9-, PW60, 150Hz, 2 2mA     Will perform review on 10-11 on next visit and make further adjustments

## 2022-03-28 ENCOUNTER — TELEPHONE (OUTPATIENT)
Dept: NEUROLOGY | Facility: CLINIC | Age: 77
End: 2022-03-28

## 2022-03-28 NOTE — TELEPHONE ENCOUNTER
Pt calling to inform that he was told to call if tremors on right hand gettting worse  Pt states tremors getting worse and it started last week  Dr Corey Cruz - Please advise  Best  [phone], ok to leave detailed message

## 2022-03-30 NOTE — TELEPHONE ENCOUNTER
Called pt  He states that he is not sure if he is comfortable using pt   I gave him yeimy dougherty's phone number  He will contact Prateek Gonsalez for assistance      Also clarified instructions

## 2022-03-30 NOTE — TELEPHONE ENCOUNTER
If he feels comfortable with the patient   He could attempt to increase amplitude on left from 2 2 to 2 3   If no improvement after a day can increase to 2 3

## 2022-03-30 NOTE — TELEPHONE ENCOUNTER
Left detailed message for pt regaridng below  Made him aware that I would need to clarify with dr Dedra Piedra what she would like to increase to if first increase is not effective  And to call office back to let us know if he is comfortable using pt         Dr Marii Murphy clarify instructions below

## 2022-04-28 ENCOUNTER — OFFICE VISIT (OUTPATIENT)
Dept: NEUROLOGY | Facility: CLINIC | Age: 77
End: 2022-04-28
Payer: MEDICARE

## 2022-04-28 VITALS — HEART RATE: 99 BPM | SYSTOLIC BLOOD PRESSURE: 124 MMHG | DIASTOLIC BLOOD PRESSURE: 76 MMHG

## 2022-04-28 DIAGNOSIS — G25.0 ESSENTIAL TREMOR: Primary | ICD-10-CM

## 2022-04-28 DIAGNOSIS — Z96.82 PRESENCE OF NEUROSTIMULATOR: ICD-10-CM

## 2022-04-28 PROCEDURE — 95983 ALYS BRN NPGT PRGRMG 15 MIN: CPT | Performed by: PSYCHIATRY & NEUROLOGY

## 2022-04-28 PROCEDURE — 95984 ALYS BRN NPGT PRGRMG ADDL 15: CPT | Performed by: PSYCHIATRY & NEUROLOGY

## 2022-04-28 NOTE — PROGRESS NOTES
Patient ID: Stephanie Bauer  is a 78 y o  male    Assessment/Plan:    Essential tremor  Right hand tremor reported to have returned after last visit  Moderate and interferes with eating  Left hand tremor continues to be well controlled but he is not wishing to try using his left hand for activities     Deep brain stimulator was interrogated  Bilateral Vim DBS, [employer] [facility]   Approximately 40spent on deep brain stimulation programming  Right hand with proximal > distal tremor  Reviewed prior notes: On left pulse width 60, rate 150  Monopolar Contact 1 appear to have best effect on distal tremor at 2 2 mA   Contact 2 appear to have good control of proximal tremor at 1 6 to 2 2mA  Contact 3 also greatly controlled proximal tremor  No changes made to the right lead  On left increases in amplitude with more ataxia and slurred speech  Increase contact 2 amplitude along with PW from 60 - 90 with partial improvement  See body of the note and attached clinical sheets for further information  Ultimately left on C+1-(2 1)2-(2 6), PW 90, 180Hz , 2 1mA          Diagnoses and all orders for this visit:    Essential tremor    Presence of neurostimulator        Subjective:      Giovanni Alonso is a right-handed man with essential tremor who present for follow up  To review, action tremor started around 2009 in the right hand and spread to his left hand  Treated with primidone and metoprolol for HTN  He underwent L MRI focused ultrasound at [facility] neurosurgery 1/2020 but unfortunately tremor improvement was short lived only lasting 1 month   Last seen 8/2021  He was continued on primidone 200 mg twice daily (benefit unclear)  Gabapentin up 300mg bid added  Topiramate  and zonisamide avoided given recent weight loss  Referred to [facility] neurosurgery where he underwent bilateral VIM DBS 1/10/22  He returns today for one-month follow-up programming visit      Right hand tremor was controlled for about a week but started to breakthrough  Of hand tremor has mild breakthrough tremor  He has difficulty eating, drinking, brushing teeth and doing other ADLs with his right hand  No vocal tremor  No complaints with regards to head tremor  He has noted changes in swallowing since the surgery  Gait with mild imbalance  Objective:    /76 (BP Location: Left arm, Patient Position: Sitting, Cuff Size: Standard)   Pulse 99       Physical Exam  Vitals reviewed  Eyes:      Extraocular Movements: Extraocular movements intact  Pupils: Pupils are equal, round, and reactive to light  Neurological:      Deep Tendon Reflexes: Strength normal    Psychiatric:         Speech: Speech normal          Neurological Exam  Mental Status   Oriented only to person, place and situation  Recent and remote memory are intact  Speech is normal  Follows complex commands  Attention and concentration are normal     Cranial Nerves  CN II: Right funduscopic exam: not visualized  Left funduscopic exam: not visualized  CN III, IV, VI: Extraocular movements intact bilaterally  Pupils equal round and reactive to light bilaterally  CN VII: Full and symmetric facial movement  CN VIII: Hearing is normal   CN XI: Shoulder shrug strength is normal     Motor   Normal muscle tone  Strength is 5/5 throughout all four extremities  Sensory  Light touch is normal in upper and lower extremities  Coordination  Right: Finger-to-nose abnormality: Rapid alternating movement normal   Left: Finger-to-nose abnormality: Rapid alternating movement normal   No tremor on left  Right hand with moderate tremor , ore proximal than distal  Apparent when holding a phone  No rest tremor or bradykinesia   Speech mildly slurred  Gait  Casual gait is normal including stance, stride, and arm swing  Able to rise from chair without using arms      DBS interrogation and programming note       Date Target Bat EI C 0 1 2 3 Amp PW Hz Ohm mA Benefit/Side effect 4/29/2022 L VIM    All ok +  1 9 2 1   2 1      mild slurring of speech                 2 2 2 5    2 5 60 180                                  190     Tremor worse                            180                                                         2 1 1 8     90 180      speech better and                  2 1 2 6ma      90 180                                                  Current Outpatient Medications on File Prior to Visit   Medication Sig Dispense Refill    Calcipotriene-Betameth Diprop 0 005-0 064 % FOAM Apply up to BID      Cholecalciferol (Vitamin D3) 50 MCG (2000 UT) capsule Take 1 capsule (2,000 Units total) by mouth daily  0    fluticasone (FLONASE) 50 mcg/act nasal spray 2 sprays into each nostril daily (Patient taking differently: 2 sprays into each nostril as needed  ) 16 g 5    Multiple Vitamin (multivitamin) tablet Take 1 tablet by mouth daily      PARoxetine (PAXIL) 30 mg tablet Take 1 tablet (30 mg total) by mouth daily 90 tablet 3    vitamin B-12 (CYANOCOBALAMIN) 50 MCG TABS Take 1 tablet (50 mcg total) by mouth daily 30 tablet 5    azelastine (ASTELIN) 0 1 % nasal spray 2 sprays into each nostril 2 (two) times a day Use in each nostril as directed (Patient not taking: Reported on 3/17/2022 ) 90 mL 3     No current facility-administered medications on file prior to visit           Ifeoma Horton MD  Movement disorder physician  [address]

## 2022-04-28 NOTE — PROGRESS NOTES
Review of Systems   Constitutional: Negative  Negative for appetite change and fever  HENT: Positive for voice change (Voice is off, son states voice is like he is "drunk" states his dads voice used to be just like his)  Negative for hearing loss, tinnitus and trouble swallowing  Eyes: Negative  Negative for photophobia and pain  Respiratory: Negative  Negative for shortness of breath  Cardiovascular: Negative  Negative for palpitations  Gastrointestinal: Negative  Negative for nausea and vomiting  Endocrine: Negative  Negative for cold intolerance  Genitourinary: Negative  Negative for dysuria, frequency and urgency  Musculoskeletal: Positive for gait problem  Negative for myalgias and neck pain  Balance Issues  States the back area of his head where the leads are is hard     Skin: Negative  Negative for rash  Allergic/Immunologic: Negative  Neurological: Positive for tremors (Both Hands, has gotten worse)  Negative for dizziness, seizures, syncope, facial asymmetry, speech difficulty, weakness, light-headedness, numbness and headaches  Hematological: Bruises/bleeds easily (Bruise)  Psychiatric/Behavioral: Negative  Negative for confusion, hallucinations and sleep disturbance  All other systems reviewed and are negative

## 2022-04-29 NOTE — ASSESSMENT & PLAN NOTE
Right hand tremor reported to have returned after last visit  Moderate and interferes with eating  Left hand tremor continues to be well controlled but he is not wishing to try using his left hand for activities     Deep brain stimulator was interrogated  Bilateral Vim DBS, [employer] [facility]   Approximately 40spent on deep brain stimulation programming  Right hand with proximal > distal tremor  Reviewed prior notes: On left pulse width 60, rate 150  Monopolar Contact 1 appear to have best effect on distal tremor at 2 2 mA   Contact 2 appear to have good control of proximal tremor at 1 6 to 2 2mA  Contact 3 also greatly controlled proximal tremor  No changes made to the right lead  On left increases in amplitude with more ataxia and slurred speech  Increase contact 2 amplitude along with PW from 60 - 90 with partial improvement  See body of the note and attached clinical sheets for further information    Ultimately left on C+1-(2 1)2-(2 6), PW 90, 180Hz , 2 1mA

## 2022-05-17 ENCOUNTER — OFFICE VISIT (OUTPATIENT)
Dept: SLEEP CENTER | Facility: CLINIC | Age: 77
End: 2022-05-17
Payer: MEDICARE

## 2022-05-17 VITALS
BODY MASS INDEX: 23.79 KG/M2 | DIASTOLIC BLOOD PRESSURE: 60 MMHG | HEIGHT: 68 IN | SYSTOLIC BLOOD PRESSURE: 120 MMHG | HEART RATE: 91 BPM | WEIGHT: 157 LBS

## 2022-05-17 DIAGNOSIS — G47.33 OSA (OBSTRUCTIVE SLEEP APNEA): Primary | ICD-10-CM

## 2022-05-17 PROCEDURE — 99214 OFFICE O/P EST MOD 30 MIN: CPT | Performed by: PSYCHIATRY & NEUROLOGY

## 2022-05-17 NOTE — PROGRESS NOTES
Review of Systems      Genitourinary none   Cardiology none   Gastrointestinal none   Neurology balance problems   Constitutional none   Integumentary rash or dry skin and itching   Psychiatry none   Musculoskeletal none   Pulmonary none   ENT none   Endocrine none   Hematological none

## 2022-05-17 NOTE — PROGRESS NOTES
Assessment/Plan:      1  DAY (obstructive sleep apnea)  Assessment & Plan:  Mr Eloisa Huff has a history of severe obstructive sleep apnea, in analyzing his CPAP data, his AHI is very high at home  This is a little bit hard to interpret, he has very significant mask leakage which could be a factor in this  He has an auto-CPAP which is not adjusting, probably due to mask leakage  On his initial sleep study, there was a component of central apnea, I cannot rule out that this is a factor in his treatment at home  I therefore would like him to have a CPAP titration study  He has a prominent tremor on examination, follows with Dr Juvencio Wesley  It is possible that this tremor affects his ability to adjust his mask leading to mask leakage    Orders:  -     CPAP Study; Future  -     PAP DME Resupply/Reorder         Subjective:      Patient ID: Lina Solis  is a 78 y o  male  This is a 37-year-old male who returns in follow-up for a history of severe obstructive sleep apnea, diagnosed on a home sleep test with a respiratory event index of 78  Of note, on that test per my review, approximately 16% of the events were central apneas  He has been treated with a [employer] dream station CPAP machine at the range of 7-20 cm H2O    He goes to bed 1-2 AM and wakes at 930-10 AM   He is refreshed when he wakes  Does not nap or doze    No dry nose or mouth, no nose bleeds  No nasal congestion  No aerophagia  No dream enactment, no RLS     CPAP data reviewed today-  Dream station auto CPAP set at 7-20 cm H2O  Average session 8 hours  AHI 20  Mask leak -7 hours 40 minutes per night  90% pressure -7 cm H2O        [place] Sleepiness Scale:     Sitting and reading: Would never doze  Watching TV: Slight chance of dozing  Sitting, inactive in a public place (e g  a theatre or a meeting):  Would never doze  As a passenger in a car for an hour without a break: Would never doze  Lying down to rest in the afternoon when circumstances permit: Would never doze  Sitting and talking to someone: Would never doze  Sitting quietly after a lunch without alcohol: Would never doze  In a car, while stopped for a few minutes in traffic: Would never doze  Total score: 1     The following portions of the patient's history were reviewed and updated as appropriate: allergies, current medications, past family history, past medical history, past social history, past surgical history, and problem list     Review of Systems    Genitourinary none   Cardiology none   Gastrointestinal none   Neurology balance problems   Constitutional none   Integumentary rash or dry skin and itching   Psychiatry none   Musculoskeletal none   Pulmonary none   ENT none   Endocrine none   Hematological none       Objective:    /60 (BP Location: Left arm, Patient Position: Sitting, Cuff Size: Adult)   Pulse 91   Ht 5' 8 4" (1 737 m)   Wt 71 2 kg (157 lb)   BMI 23 59 kg/m²          Physical Exam  HENT:      Head: Normocephalic and atraumatic  Mouth/Throat:      Comments: mallampati 4 airway, elongated thickened soft palate, normal tongue  Cardiovascular:      Rate and Rhythm: Normal rate and regular rhythm  Pulses: Normal pulses  Heart sounds: Normal heart sounds  Pulmonary:      Effort: Pulmonary effort is normal       Breath sounds: Normal breath sounds  Musculoskeletal:      Right lower leg: No edema  Left lower leg: No edema  Neurological:      Mental Status: He is alert  Coordination: Coordination abnormal (tremor R>L, dysmetria on FNF b/l)  Gait: Gait abnormal       Comments: + dysarthria    Psychiatric:         Mood and Affect: Mood normal          Behavior: Behavior normal          Thought Content: Thought content normal          Judgment: Judgment normal        data reviewed-notes from Dr Chandan Arenas, CPAP data

## 2022-05-17 NOTE — ASSESSMENT & PLAN NOTE
Mr Macie Ro has a history of severe obstructive sleep apnea, in analyzing his CPAP data, his AHI is very high at home  This is a little bit hard to interpret, he has very significant mask leakage which could be a factor in this  He has an auto-CPAP which is not adjusting, probably due to mask leakage  On his initial sleep study, there was a component of central apnea, I cannot rule out that this is a factor in his treatment at home  I therefore would like him to have a CPAP titration study  He has a prominent tremor on examination, follows with Dr Jasmin Moody    It is possible that this tremor affects his ability to adjust his mask leading to mask leakage
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

## 2022-05-17 NOTE — PATIENT INSTRUCTIONS
As we discussed, your CPAP machine made by [employer] is under recall   Please sign up with them by calling [phone]    Please focus on mask fit- your current mask leaks a lot    I would like you to have a repeat sleep test so we can determine settings for your machine to make it work better

## 2022-05-18 ENCOUNTER — TELEPHONE (OUTPATIENT)
Dept: NEUROLOGY | Facility: CLINIC | Age: 77
End: 2022-05-18

## 2022-05-18 NOTE — TELEPHONE ENCOUNTER
PT STOPPED IN [place] AND DROPPED NO SHOW/LATE POLICY FORM FOR SLEEP OFFICE   SCANNED INTO CHART AND FORWARD TO PROVIDER

## 2022-05-19 ENCOUNTER — OFFICE VISIT (OUTPATIENT)
Dept: NEUROLOGY | Facility: CLINIC | Age: 77
End: 2022-05-19
Payer: MEDICARE

## 2022-05-19 VITALS — HEART RATE: 84 BPM | DIASTOLIC BLOOD PRESSURE: 64 MMHG | SYSTOLIC BLOOD PRESSURE: 132 MMHG

## 2022-05-19 DIAGNOSIS — G25.0 ESSENTIAL TREMOR: ICD-10-CM

## 2022-05-19 DIAGNOSIS — L08.9 INFECTION OF SKIN: ICD-10-CM

## 2022-05-19 DIAGNOSIS — R68.89 SUSPECTED VIRAL INFECTION OF SKIN: ICD-10-CM

## 2022-05-19 DIAGNOSIS — Z96.82 PRESENCE OF NEUROSTIMULATOR: ICD-10-CM

## 2022-05-19 PROCEDURE — 99213 OFFICE O/P EST LOW 20 MIN: CPT | Performed by: PSYCHIATRY & NEUROLOGY

## 2022-05-19 PROCEDURE — 95984 ALYS BRN NPGT PRGRMG ADDL 15: CPT | Performed by: PSYCHIATRY & NEUROLOGY

## 2022-05-19 PROCEDURE — 95983 ALYS BRN NPGT PRGRMG 15 MIN: CPT | Performed by: PSYCHIATRY & NEUROLOGY

## 2022-05-19 RX ORDER — CEPHALEXIN 500 MG/1
500 CAPSULE ORAL EVERY 6 HOURS SCHEDULED
Qty: 28 CAPSULE | Refills: 0 | Status: SHIPPED | OUTPATIENT
Start: 2022-05-19 | End: 2022-05-26

## 2022-05-19 NOTE — PROGRESS NOTES
Patient ID: Nathanael Prater  is a 78 y o  male  Assessment/Plan:    Essential tremor  Left hand tremor controlled  Moderate breakthrough of right hand action and postural tremor  In addition he has had significant changes in speech and swallowing noted since surgery but worsened with increases in stimulation  Approximately 1 hour and 15 min was spent on deep brain stimulation programming  Time spent reviewing prior notes  Reassessed affect monopolar stimulation of contact 1  He did appear to has less speech issues when this was done  Then sense did segments  Much improvement in speech and tremor noted with stimulation at lower amplitudes on 1a-  This was done on higher PW and rate  then initial monopolar survey, pulse width of 90 and frequency 180  See Clinical note within chart and attached clinical sheets for further details as to settings and programming  Left on new active group  Prior group on group a  Infection of skin   Redness, mild swelling, and small break in in skin with mild discharge along the extension site  Contacted patient's neurosurgeon at [facility]  Was suggested we start him on Keflex 500 milligrams q i d  for 7 days  They will contact the patient to have him return to the office to be seen for evaluation next week  Diagnoses and all orders for this visit:    Essential tremor    Suspected viral infection of skin  -     cephalexin (KEFLEX) 500 mg capsule; Take 1 capsule (500 mg total) by mouth every 6 (six) hours for 7 days    Presence of neurostimulator    Infection of skin           Subjective:    Sami Cantor is a right-handed man with essential tremor who present for follow up  To review, action tremor started around 2009 in the right hand and spread to his left hand  Treated with primidone and metoprolol for HTN   He underwent L MRI focused ultrasound at [facility] neurosurgery 1/2020 but unfortunately tremor improvement was short lived only lasting 1 month   Last seen 8/2021  He was continued on primidone 200 mg twice daily (benefit unclear)  Gabapentin up 300mg bid added  Topiramate  and zonisamide avoided given recent weight loss  Referred to [facility] neurosurgery where he underwent bilateral VIM DBS 1/10/22  He returns today with continued right hand action tremor which effects using this hand to eat drink and brush teeth  Left hand tremor fairly well controlled and he has been counseled to try to use this hand for most actions         The reports speech and swallowing difficulties present since surgery have worsened as stimulation has been increased  He has mild imbalance               Objective:    Blood pressure 132/64, pulse 84  Physical Exam  Vitals reviewed  Eyes:      Extraocular Movements: Extraocular movements intact  Pupils: Pupils are equal, round, and reactive to light  Neurological:      Cranial Nerves: Dysarthria present  Deep Tendon Reflexes: Strength normal          Neurological Exam  Mental Status   Oriented to person, place, time and situation  Recent and remote memory are intact  Moderate dysarthria present  Follows complex commands  Attention and concentration are normal     Cranial Nerves  CN II: Right funduscopic exam: not visualized  Left funduscopic exam: not visualized  CN III, IV, VI: Extraocular movements intact bilaterally  Pupils equal round and reactive to light bilaterally  CN VII: Full and symmetric facial movement  CN VIII: Hearing is normal   CN XI: Shoulder shrug strength is normal     Motor   Normal muscle tone  Strength is 5/5 throughout all four extremities  Sensory  Light touch is normal in upper and lower extremities       Coordination  Right: Finger-to-nose abnormality: Rapid alternating movement normal Left: Finger-to-nose normal  Rapid alternating movement normal   No tremor on left FTN    Moderate right proximal and distal tremor on FTN  Moderate postural tremor with hand outstretched and activated when hand in a partially rested position on lap       Gait  Casual gait: Wide stance  Able to rise from chair without using arms  DBS interrogation and programming note       Date Target Bat EI C 0 1 2 3 Amp PW Hz Ohm mA Benefit/Side effect     5/19/2022 L   All ok +   -  (2 1)  -  (2 6)   2 1 90 180                      -      1 4 90 180     50% improve proximal control  Arm out no tremor- mild slurring but much better than on arrival  Has rest with activation                        1 8                                  2 2         Better distal tremor, able to hold out tremor  80% prox and distal control   Mod slurring but still better                                                        -  1  90 180      mod slurring - worse                  a-      1 8  90 180     50% improvement in tremor, speech better                   a-      2          80%          a-        90% control on prox and distal, speech better than on arrival           b-   1 5 90 180   75% improved tremor , speech worse-              2     spech                                ROS:    Review of Systems   Constitutional: Negative  Negative for appetite change and fever  HENT: Positive for trouble swallowing and voice change  Negative for hearing loss and tinnitus  Eyes: Negative  Negative for photophobia and pain  Respiratory: Negative  Negative for shortness of breath  Cardiovascular: Negative  Negative for palpitations  Gastrointestinal: Negative  Negative for nausea and vomiting  Endocrine: Negative  Negative for cold intolerance  Genitourinary: Negative  Negative for dysuria, frequency and urgency  Musculoskeletal: Positive for gait problem  Negative for myalgias and neck pain  Balance Issues  Hard area in the back of the head     Skin: Negative  Negative for rash  Allergic/Immunologic: Negative  Neurological: Positive for tremors (Right Hand and Left as well) and speech difficulty   Negative for dizziness, seizures, syncope, facial asymmetry, weakness, light-headedness, numbness and headaches  Hematological: Bruises/bleeds easily (Bruise)  Psychiatric/Behavioral: Negative  Negative for confusion, hallucinations and sleep disturbance  All other systems reviewed and are negative

## 2022-05-19 NOTE — ASSESSMENT & PLAN NOTE
Redness, mild swelling, and small break in in skin with mild discharge along the extension site  Contacted patient's neurosurgeon at [place]  Was suggested we start him on Keflex 500 milligrams q i d  for 7 days  They will contact the patient to have him return to the office to be seen for evaluation next week

## 2022-05-24 ENCOUNTER — TELEPHONE (OUTPATIENT)
Dept: SLEEP CENTER | Facility: CLINIC | Age: 77
End: 2022-05-24

## 2022-05-24 NOTE — TELEPHONE ENCOUNTER
Rx for PAP resupply and office note sent to [facility] via [facility]

## 2022-07-06 ENCOUNTER — PROCEDURE VISIT (OUTPATIENT)
Dept: NEUROLOGY | Facility: CLINIC | Age: 77
End: 2022-07-06
Payer: MEDICARE

## 2022-07-06 VITALS — SYSTOLIC BLOOD PRESSURE: 132 MMHG | DIASTOLIC BLOOD PRESSURE: 70 MMHG | HEART RATE: 88 BPM

## 2022-07-06 DIAGNOSIS — G25.0 ESSENTIAL TREMOR: Primary | ICD-10-CM

## 2022-07-06 DIAGNOSIS — Z96.82 PRESENCE OF NEUROSTIMULATOR: ICD-10-CM

## 2022-07-06 PROCEDURE — 95983 ALYS BRN NPGT PRGRMG 15 MIN: CPT | Performed by: PSYCHIATRY & NEUROLOGY

## 2022-07-06 PROCEDURE — 95984 ALYS BRN NPGT PRGRMG ADDL 15: CPT | Performed by: PSYCHIATRY & NEUROLOGY

## 2022-07-06 RX ORDER — CLINDAMYCIN HYDROCHLORIDE 300 MG/1
CAPSULE ORAL
COMMUNITY
Start: 2022-06-21

## 2022-07-06 NOTE — PROGRESS NOTES
Review of Systems   Constitutional: Negative  Negative for appetite change and fever  HENT: Positive for voice change (Ongoing)  Negative for hearing loss, tinnitus and trouble swallowing  Eyes: Negative  Negative for photophobia and pain  Respiratory: Negative  Negative for shortness of breath  Cardiovascular: Negative  Negative for palpitations  Gastrointestinal: Negative  Negative for nausea and vomiting  Endocrine: Negative  Negative for cold intolerance  Genitourinary: Negative  Negative for dysuria, frequency and urgency  Musculoskeletal: Positive for gait problem  Negative for myalgias and neck pain  Balance issues a little bit     Skin: Negative  Negative for rash  Allergic/Immunologic: Negative  Neurological: Positive for tremors (Right hand)  Negative for dizziness, seizures, syncope, facial asymmetry, speech difficulty, weakness, light-headedness, numbness and headaches  Hematological: Negative  Does not bruise/bleed easily  Psychiatric/Behavioral: Negative  Negative for confusion, hallucinations and sleep disturbance  All other systems reviewed and are negative

## 2022-07-06 NOTE — PROGRESS NOTES
Patient ID: Carl Mendoza  is a 78 y o  male    Assessment/Plan:    Essential tremor  Tremors were fairly well controlled after the last programming visit  In recent weeks he has had significant breakthrough tremor of the right hand present with posture and action and mild breakthrough 0 intentional tremor of left hand  Underlying skin breakthrough with infection now resolved with neurosurgical intervention  Approximately 65 minutes were spent on deep brain stimulation programming  Amplitude of right lead increase with improvement in left hand tremor  Changes to left lead with improvement in right hand tremor although not complete  See body of the note for clinical details and final settings  Speech in gait remained unchanged in office  Diagnoses and all orders for this visit:    Essential tremor    Presence of neurostimulator    Other orders  -     clindamycin (CLEOCIN) 300 MG capsule; TAKE ONE CAPSULE 4 TIMES A DAY FOR 10 DAYS        Subjective:      Aleksandra Webber is a right-handed man with essential tremor who present for follow up  To review, action tremor started around 2009 in the right hand and spread to his left hand  Treated with primidone and metoprolol for HTN  He underwent L MRI focused ultrasound at [facility] neurosurgery 1/2020 but unfortunately tremor improvement was short lived only lasting 1 month   Last seen 8/2021  He was continued on primidone 200 mg twice daily (benefit unclear)  Gabapentin up 300mg bid added  Topiramate  and zonisamide avoided given recent weight loss  Referred to [facility] neurosurgery where he underwent bilateral VIM DBS 1/10/22  Speech changes with higher stimulation  He underwent left neck wound debridement and washout 6/15/22 for skin breakdown with exposed wire  Extensions  and it is healing well  Right hand action tremor returned a few weeks ago  Left hand tremor tremor is fairly well controlled   He is using his left hand for most activities  He difficulty eating and drinking given he is using his non-dominant hand  Speech is stable with mild slurring  No changes in gait  No recent falls  Objective:    /70 (BP Location: Left arm, Patient Position: Sitting, Cuff Size: Standard)   Pulse 88       Physical Exam  Vitals reviewed  Eyes:      Extraocular Movements: Extraocular movements intact  Pupils: Pupils are equal, round, and reactive to light  Neurological:      Deep Tendon Reflexes: Strength normal          Neurological Exam  Mental Status   Oriented only to person, place and situation  Recent and remote memory are intact  Speech: Mild slurring   Follows complex commands  Attention and concentration are normal     Cranial Nerves  CN II: Right funduscopic exam: not visualized  Left funduscopic exam: not visualized  CN III, IV, VI: Extraocular movements intact bilaterally  Pupils equal round and reactive to light bilaterally  CN VII: Full and symmetric facial movement  CN VIII: Hearing is normal   CN IX, X: Palate elevates symmetrically  CN XI: Shoulder shrug strength is normal   CN XII: Tongue midline without atrophy or fasciculations  Motor   Normal muscle tone  Strength is 5/5 throughout all four extremities  Sensory  Light touch is normal in upper and lower extremities  Coordination  Right: Finger-to-nose normal Left: Finger-to-nose normal   Moderate tremor on FTN R>L  Moderate large amplitude tremor with any action on right  NO head tremor  No vocal tremor  No rest tremor   Gait  Casual gait: Wide stance  Reduced stride length  Able to rise from chair without using arms      DBS interrogation and programming note  [employer], [employer] , [employer]ight leads  Battery 91% 6yrs 9 months   Impedence ok    group D     Date Target Bat EI C 0 1 2 3 Amp PW Hz Ohm mA Benefit/Side effect     7/7/2022 L VIM   All ok +   a-   2 1 90 180                            2 8         No clear improve in tremor a 1 1 a 0 8              mild prox improvement                   a  1  a  1 5             80 % improved distal                                                    a   1 6  a  0 9              Distal 85%  Prox 90%, postural with hand outstretched fairly controlled                             190     No change                                            a  1 9 A  0 9       Distal worse          A  1 6 A  0 9   100    Slurred speech           1a 1 2a  3 8 90 180            Date Target Bat EI C 8 9 10 11 Amp PW Hz Ohm mA Benefit/Side effect     7/7/2022 R VIM  All ok +  abc     2 4 60 180                            2 5          mild distal tremor                        2 6 90 180                                80       Speech back to when he came in                                                                                                                                                      Current Outpatient Medications on File Prior to Visit   Medication Sig Dispense Refill    Calcipotriene-Betameth Diprop 0 005-0 064 % FOAM Apply up to BID      Cholecalciferol (Vitamin D3) 50 MCG (2000 UT) capsule Take 1 capsule (2,000 Units total) by mouth daily  0    clindamycin (CLEOCIN) 300 MG capsule TAKE ONE CAPSULE 4 TIMES A DAY FOR 10 DAYS      fluticasone (FLONASE) 50 mcg/act nasal spray 2 sprays into each nostril daily (Patient taking differently: 2 sprays into each nostril as needed) 16 g 5    Multiple Vitamin (multivitamin) tablet Take 1 tablet by mouth daily      PARoxetine (PAXIL) 30 mg tablet Take 1 tablet (30 mg total) by mouth daily 90 tablet 3    vitamin B-12 (CYANOCOBALAMIN) 50 MCG TABS Take 1 tablet (50 mcg total) by mouth daily 30 tablet 5    azelastine (ASTELIN) 0 1 % nasal spray 2 sprays into each nostril 2 (two) times a day Use in each nostril as directed (Patient not taking: No sig reported) 90 mL 3     No current facility-administered medications on file prior to visit           Gloria Downs MD [place]  Movement disorder physician  [address] [place] [address]

## 2022-07-07 NOTE — ASSESSMENT & PLAN NOTE
Tremors were fairly well controlled after the last programming visit  In recent weeks he has had significant breakthrough tremor of the right hand present with posture and action and mild breakthrough 0 intentional tremor of left hand  Underlying skin breakthrough with infection now resolved with neurosurgical intervention  Approximately 65 minutes were spent on deep brain stimulation programming  Amplitude of right lead increase with improvement in left hand tremor  Changes to left lead with improvement in right hand tremor although not complete  See body of the note for clinical details and final settings  Speech in gait remained unchanged in office

## 2022-09-15 ENCOUNTER — OFFICE VISIT (OUTPATIENT)
Dept: NEUROLOGY | Facility: CLINIC | Age: 77
End: 2022-09-15
Payer: MEDICARE

## 2022-09-15 ENCOUNTER — TELEPHONE (OUTPATIENT)
Dept: NEUROLOGY | Facility: CLINIC | Age: 77
End: 2022-09-15

## 2022-09-15 VITALS
WEIGHT: 158 LBS | SYSTOLIC BLOOD PRESSURE: 136 MMHG | HEART RATE: 81 BPM | DIASTOLIC BLOOD PRESSURE: 66 MMHG | BODY MASS INDEX: 23.74 KG/M2

## 2022-09-15 DIAGNOSIS — G25.0 ESSENTIAL TREMOR: Primary | ICD-10-CM

## 2022-09-15 PROCEDURE — 95984 ALYS BRN NPGT PRGRMG ADDL 15: CPT | Performed by: PSYCHIATRY & NEUROLOGY

## 2022-09-15 PROCEDURE — 95983 ALYS BRN NPGT PRGRMG 15 MIN: CPT | Performed by: PSYCHIATRY & NEUROLOGY

## 2022-09-15 NOTE — PROGRESS NOTES
Patient ID: Pako Hightower  is a 78 y o  male  Assessment/Plan:    No problem-specific Assessment & Plan notes found for this encounter  Diagnoses and all orders for this visit:    Essential tremor           Subjective:    Laurel Bush is a right-handed man with essential tremor who present for follow up  To review, action tremor started around 2009 in the right hand and spread to his left hand  Treated with primidone and metoprolol for HTN  He underwent L MRI focused ultrasound at [facility] neurosurgery 1/2020 but unfortunately tremor improvement was short lived only lasting 1 month   Last seen 8/2021  He was continued on primidone 200 mg twice daily (benefit unclear)  Gabapentin up 300mg bid added  Topiramate  and zonisamide avoided given recent weight loss  Referred to [facility] neurosurgery where he underwent bilateral VIM DBS 1/10/22  Speech changes with higher stimulation  He underwent left neck wound debridement and washout 6/15/22 for skin breakdown with exposed wire  Extensions  and it is healing  There is some redness and slight skin breakdown without exposure at left neck site for which he was recently started on a course of antibiotics after contacting neurosurgery     His speech and swallowing is worse  He has return of right hand tremor  He has dysphagia with liquids  Right and tremor was best after programmed in May but this only lasted a few weeks before it returned  He has increased balance difficulties  He still tries to use right hand to eat and drink which makes his difficult  Objective:    Blood pressure 136/66, pulse 81, weight 71 7 kg (158 lb)  Physical Exam  Vitals reviewed  Eyes:      Extraocular Movements: Extraocular movements intact  Neurological:      Mental Status: He is alert  Cranial Nerves: Dysarthria present  Deep Tendon Reflexes: Strength normal          Neurological Exam  Mental Status  Alert   Oriented only to person, place and situation  Mild dysarthria present  Follows complex commands  Attention and concentration are normal     Cranial Nerves  CN II: Right funduscopic exam: not visualized  Left funduscopic exam: not visualized  CN III, IV, VI: Extraocular movements intact bilaterally  CN VII: Full and symmetric facial movement  CN VIII: Hearing is normal   CN IX, X: Palate elevates symmetrically  CN XI: Shoulder shrug strength is normal   CN XII: Tongue midline without atrophy or fasciculations  Motor   Normal muscle tone  Strength is 5/5 throughout all four extremities  Sensory  Light touch is normal in upper and lower extremities  Coordination    Moderate postural and action tremor on the right  Mild intentional tremor on the left  No rest tremor  Gait  Casual gait: Wide stance  Unable to rise from chair without using arms  Reduced right arm swing   Mild imbalance on turn   DBS interrogation and programming note   Slurred speech  Turned off, slight improvement but still slurred  Final settings:    Group B  Right VIM  C+10abc-, PW70, 180Hz, 1 9mA (0-2 4)    Left VIM  C+1a-2a- , , 180H mA(0 2 4)      Date Target Bat EI C 0 1 2 3 Amp PW Hz Ohm mA Benefit/Side effect     9/16/2022 L VIM  3yr 7 mo All ok +   a- A-   3 8 90 180                                                grp B        a- A-    2  100       90         Date Target Bat EI C 8 9 10 11 Amp PW Hz Ohm mA Benefit/Side effect     9/16/2022 R VIM  All ok +  Abc-    2 6 80 180                      off               Speech better                    abc-    1 5 70 180     Speech still better                     abc-    1 9 70 180     Tremor 80 % improvded                         100        90 %                          110        worse postural                                                                                  ROS:    Review of Systems   Constitutional: Negative  Negative for appetite change and fever     HENT: Positive for trouble swallowing and voice change  Negative for hearing loss and tinnitus  Eyes: Negative  Negative for photophobia and pain  Respiratory: Negative  Negative for shortness of breath  Cardiovascular: Negative  Negative for palpitations  Gastrointestinal: Negative  Negative for nausea and vomiting  Endocrine: Negative  Negative for cold intolerance  Genitourinary: Negative  Negative for dysuria, frequency and urgency  Musculoskeletal: Positive for gait problem (has gotten worse, shuffles feet)  Negative for myalgias and neck pain  Balance Issues, stayed the same     Skin: Negative  Negative for rash  Allergic/Immunologic: Negative  Neurological: Positive for tremors (Right arm and has gotten worse) and speech difficulty  Negative for dizziness, seizures, syncope, facial asymmetry, weakness, light-headedness, numbness and headaches  Hematological: Negative  Does not bruise/bleed easily  Psychiatric/Behavioral: Negative  Negative for confusion, hallucinations and sleep disturbance  All other systems reviewed and are negative  hallucinations and sleep disturbance  All other systems reviewed and are negative

## 2022-09-15 NOTE — TELEPHONE ENCOUNTER
Dr Candy Thornton stated for patient to be scheduled for 90 minute DBS on October 27th  Schedule is not open for that day  Please advise time to schedule patient and open appointment time  Will call patient to schedule

## 2022-09-16 NOTE — ASSESSMENT & PLAN NOTE
Patient returns with breakthrough tremor  He has speech and swallowing changes since surgery which as only increased with increases in stimulation  Approximately 30 minutes spent on deep brain stimulation programming to with changes made in an attempt to better control tremor without worsening speech  See body of note for details  Final settings: Active Group B  Right VIM  C+10abc-, PW70, 180Hz, 1 9mA (0-2 4)    Left VIM  C+1a-2a- , , 180H mA(0 2 4)    Left hand tremor controlled  Speech appeared to be better  Will see if setting with lower amplitude improve his swallowing  Will have him return for longer programming visit next month for review of segments on contact 1 and 2 to see if we may be able to find better control without side effect  We did discuss the likelihood that his right hand tremor will not be completely controlled as it was resistant to [address] as well as appears to breakthrough settings   Encouraged to start training himself to use his left hand for most activities,

## 2022-09-19 NOTE — TELEPHONE ENCOUNTER
Called patient and lmom regarding scheduling follow up appointment on 10/27  Asked patient to return call to the office to schedule the appointment

## 2022-09-19 NOTE — TELEPHONE ENCOUNTER
Returned patient call regarding scheduleing for 10/27  LMOM for patient to return call to schedule       Patient is to be scheduled on 10/27 in [place] for 90 minute DBS

## 2022-09-23 ENCOUNTER — TELEPHONE (OUTPATIENT)
Dept: NEUROLOGY | Facility: CLINIC | Age: 77
End: 2022-09-23

## 2022-09-23 NOTE — TELEPHONE ENCOUNTER
Pt left ,stated that he just saw dr Shawanda Brooks on 9/15 but his left hand is getting worse,please let me know what to do next  Called pt back at [phone] to get more information ,but went to [facility] message requesting call back

## 2022-09-23 NOTE — TELEPHONE ENCOUNTER
Pt has called back and scheduled for 10/27/22 at the [facility] hold time for pt   As per below request     Thank you,     Nella Martinez

## 2022-09-27 NOTE — TELEPHONE ENCOUNTER
I'm my, my name is Caesar Mall  I was born [birth year]  I'm a patient of Dr Ron Smith  She worked on my, I have a sense of humor as the operation in my brain  She's been ah, setting you up the generator  My left and got worse in my life was what was was September 15th i'm wondering if there's anything we could do  Yeah  Yeah was well thank you  Per vm, he is a pt of Dr Magdalena Hedrick  He has a essential tremor and he had operation in his brain  Dr Magdalena Hedrick setting up the generator  His left hand got worse since last visit on 9/15/22  Wondering if there is anything Dr Magdalena Hedrick can do to get less shaking

## 2022-10-03 NOTE — TELEPHONE ENCOUNTER
If they feel comfortable with patient programmed  He can try increasing settings for left hand from 1 9mA  to 2 and if no improvement 2 1

## 2022-10-18 NOTE — ASSESSMENT & PLAN NOTE
Moderate tremors affecting function  He is status post left MRI focused ultrasound thalamotomy in January of 2021  There was initial improvement of right hand tremor but this was short lived only lasting 1 month  Unfortunately after contacting them he tells me there was not much else that they could offer  He continues on primidone 200 mg twice daily  It is unclear how much benefit this is providing  Time spent discussing potential treatment options  We could try adding gabapentin  We will avoid topiramate  and zonisamide given recent weight loss  He opted to add gabapentin  Will continue primidone  Add gabapentin 100mg 1 tab twice daily   If no improvement aftera week increase to 2 tab twice daily  Goal is to slowly increase gabapentin to see if this improves tremor  If tremor improves we could trial a slow taper of primidone  Additional time spent answering questions regarding other potential treatment options  Questions regarding Melissa Gutierrez were answered  Questions regarding deep brain stimulation surgery were answered  There is potential for implantation of the right and potentially left thalamus  This would help in controlling tremor  We discussed potential benefits and side effects  He wishes to give this more thought 
Alert-The patient is alert, awake and responds to voice. The patient is oriented to time, place, and person. The triage nurse is able to obtain subjective information.

## 2022-12-16 DIAGNOSIS — F33.1 MODERATE EPISODE OF RECURRENT MAJOR DEPRESSIVE DISORDER (HCC): ICD-10-CM

## 2022-12-16 RX ORDER — PAROXETINE 30 MG/1
30 TABLET, FILM COATED ORAL DAILY
Qty: 90 TABLET | Refills: 3 | Status: SHIPPED | OUTPATIENT
Start: 2022-12-16

## 2023-01-10 ENCOUNTER — TELEPHONE (OUTPATIENT)
Dept: NEUROLOGY | Facility: CLINIC | Age: 78
End: 2023-01-10

## 2023-01-10 NOTE — TELEPHONE ENCOUNTER
Called and spoke with patient and patient benjamin to schedule patient for appointment with Ciarra Barton  Patients daughter states that they are currently on their way home from [place] where patient had the screws put in for the surgery next week  Patient daughter states that patient will be going back to the surgeon at [facility] after the procedure to have everything tuned and turned on for the tremors and they will than after everything is done in [place] will would like to follow up with Dr Candy Thornton for to keep the DBS maintained only

## 2023-01-24 ENCOUNTER — VBI (OUTPATIENT)
Dept: ADMINISTRATIVE | Facility: OTHER | Age: 78
End: 2023-01-24

## 2023-01-24 LAB
EXTERNAL CREATININE: 0.44
EXTERNAL EGFR: 90

## 2023-02-14 ENCOUNTER — NURSING HOME VISIT (OUTPATIENT)
Dept: GERIATRICS | Facility: OTHER | Age: 78
End: 2023-02-14

## 2023-02-14 DIAGNOSIS — G25.0 ESSENTIAL TREMOR: Primary | ICD-10-CM

## 2023-02-14 DIAGNOSIS — E53.8 VITAMIN B12 DEFICIENCY: ICD-10-CM

## 2023-02-14 DIAGNOSIS — N40.0 BENIGN PROSTATIC HYPERPLASIA, UNSPECIFIED WHETHER LOWER URINARY TRACT SYMPTOMS PRESENT: ICD-10-CM

## 2023-02-14 DIAGNOSIS — S22.42XD CLOSED FRACTURE OF MULTIPLE RIBS OF LEFT SIDE WITH ROUTINE HEALING, SUBSEQUENT ENCOUNTER: ICD-10-CM

## 2023-02-14 DIAGNOSIS — I49.8 SINUS ARRHYTHMIA: ICD-10-CM

## 2023-02-14 DIAGNOSIS — K59.09 OTHER CONSTIPATION: ICD-10-CM

## 2023-02-14 DIAGNOSIS — L40.9 PSORIASIS: ICD-10-CM

## 2023-02-14 DIAGNOSIS — R13.19 OTHER DYSPHAGIA: ICD-10-CM

## 2023-02-14 DIAGNOSIS — G40.409 TONIC CLONIC CONVULSION (HCC): ICD-10-CM

## 2023-02-14 DIAGNOSIS — F41.9 ANXIETY: ICD-10-CM

## 2023-02-14 DIAGNOSIS — R73.9 HYPERGLYCEMIA: ICD-10-CM

## 2023-02-14 DIAGNOSIS — F33.1 MODERATE EPISODE OF RECURRENT MAJOR DEPRESSIVE DISORDER (HCC): ICD-10-CM

## 2023-02-14 PROBLEM — S22.42XA FRACTURE OF MULTIPLE RIBS OF LEFT SIDE: Status: ACTIVE | Noted: 2023-01-21

## 2023-02-14 PROBLEM — R47.1 DYSARTHRIA: Status: ACTIVE | Noted: 2023-01-21

## 2023-02-14 RX ORDER — LEVETIRACETAM 500 MG/1
500 TABLET ORAL 2 TIMES DAILY
COMMUNITY
Start: 2023-02-06 | End: 2023-03-08

## 2023-02-14 RX ORDER — PSEUDOEPHEDRINE HCL 30 MG
100 TABLET ORAL 2 TIMES DAILY
COMMUNITY
Start: 2023-02-06

## 2023-02-14 RX ORDER — MULTIPLE VITAMINS W/ MINERALS TAB 9MG-400MCG
1 TAB ORAL DAILY
COMMUNITY
Start: 2023-02-07

## 2023-02-14 RX ORDER — SENNA PLUS 8.6 MG/1
17.2 TABLET ORAL DAILY
COMMUNITY
Start: 2023-02-07

## 2023-02-14 RX ORDER — ACETAMINOPHEN 325 MG/1
650 TABLET ORAL EVERY 6 HOURS PRN
COMMUNITY
Start: 2022-01-11

## 2023-02-14 RX ORDER — TERAZOSIN 2 MG/1
2 CAPSULE ORAL DAILY
COMMUNITY
Start: 2023-02-06 | End: 2023-03-08

## 2023-02-14 RX ORDER — PYRIDOXINE HCL (VITAMIN B6) 50 MG
100 TABLET ORAL DAILY
Qty: 30 TABLET | Refills: 5 | Status: SHIPPED | OUTPATIENT
Start: 2023-02-14

## 2023-02-14 RX ORDER — POLYETHYLENE GLYCOL 3350 17 G/17G
17 POWDER, FOR SOLUTION ORAL
COMMUNITY
Start: 2023-02-06 | End: 2023-03-08

## 2023-02-14 RX ORDER — BISACODYL 10 MG
10 SUPPOSITORY, RECTAL RECTAL
COMMUNITY
Start: 2023-02-06

## 2023-02-14 NOTE — ASSESSMENT & PLAN NOTE
Per records from [facility], pt underwent a MBS on 1/31/2023  Speech recommended regular diet with mildy thickened liquids

## 2023-02-14 NOTE — ASSESSMENT & PLAN NOTE
S/p DBS 1/18/2023  Pt has upcoming neurology appt in 2 weeks with neurosurgery on 2/28/2023  Pt locally sees Dr Silvia Rossi Neurology  Please arrange f/u appt with clinics

## 2023-02-14 NOTE — PROGRESS NOTES
[address]  [phone]  [facility] [place] [facility]  POS 32      NAME: Rene Herbert  AGE: 78 y o  SEX: male [id number]    DATE OF ENCOUNTER: 2/14/2023    Assessment and Plan     Chief Complaint     Routine Long term follow-up visit  History of Present Illness   , current medications, past family history, past medical history, past social history, past surgical history and problem list     Review of Systems     Review of Systems   All other systems reviewed and are negative  Active Problem List     Patient Active Problem List   Diagnosis   • Essential tremor   • Diabetic retinopathy ([facility] [address] )   • Eczema   • Essential hypertension   • Impotence of organic origin   • Psoriasis   • DAY (obstructive sleep apnea)   • Vitamin B12 deficiency   • Vitamin D deficiency   • Type 2 diabetes mellitus with ophthalmic complication, without long-term current use of insulin (HCC)   • Moderate episode of recurrent major depressive disorder (HCC)   • Dyslipidemia   • Sinus arrhythmia   • Rhinorrhea   • Abnormal weight loss   • Chronic maxillary sinusitis   • Presence of neurostimulator   • Infection of skin   • Tonic clonic convulsion (HCC)   • Fracture of multiple ribs of left side   • Dysarthria   • Other dysphagia   • Anxiety   • Hyperglycemia   • BPH (benign prostatic hyperplasia)   • Other constipation       Objective     Vitals: Reviewed in [employer] system   VSS    129 /  69 mmHg 2/14/2023    Temperature: 98 0 °F    Pulse: 80 bpm    Respirations: 18 Breaths/min    Blood Sugar:     O2 Saturation: 97 0 %    Height: 67 0 Inches      General: Awake, A&Ox3  Head: normocephalic, old surgical scars intact  Cardiovascular: RRR  Lungs: Clear to auscultation bilaterally  Abdomen: nontender/nondistended, +BS  Legs: no cyanosis, clubbing or edema  Skin: clean, dry; b/l shins with psoriasis  Lt foot - clean, dry, intact; heel area with a lot of dry skin  Pt with Lt arm in sling  Psych: calm, cooperative  Neuro: no focal neuro deficits    Pertinent Laboratory/Diagnostic Studies:  Refer to facility chart  Current Medications   Medications reviewed and updated in facility chart      Dwayne Bolton MD  Internal Medicine  Senior Care Physician

## 2023-02-14 NOTE — ASSESSMENT & PLAN NOTE
S/p recent DBS placement  Cont keppra 500 mg 1 tab po BID  Pt had his DBS adjusted 1/30/2023  F/u outpatient with Dr Silvia Rossi Neurology  PT/OT to eval/tx

## 2023-02-14 NOTE — ASSESSMENT & PLAN NOTE
Pt with mild arrythymia noted upon ausculation  Unlikely afib but to double check will check EKG in am  Pt had tachycardia when he was at rehab; resolved  Per records, EKG 1/31 showed rate 92, normal sinus with some widened QRS complex  HR stable at 80

## 2023-02-14 NOTE — ASSESSMENT & PLAN NOTE
Wife passed away on 12/2018  Pt proposed to her on castillo's day  Pt stable & happy with daughter  Cont paroxetine 30 mg 1 tab po qam

## 2023-02-15 NOTE — PROGRESS NOTES
[facility]  [address] [place] [address] [id number]    Nursing Home Admission    NAME: Dutch Escalante  AGE: 78 y o  SEX: male [id number]    DATE OF ENCOUNTER: 2/14/2023    Assessment/Plan:   Patient’s care was coordinated with nursing facility staff  Recent vitals, labs and updated medications were reviewed on [facility]  Past Medical, surgical, social, medication and allergy history and patient’s previous records reviewed  1  Essential tremor        2  Tonic clonic convulsion (HCC)        3  Sinus arrhythmia        4  Vitamin B12 deficiency  vitamin B-12 (CYANOCOBALAMIN) 50 MCG TABS      5  Closed fracture of multiple ribs of left side with routine healing, subsequent encounter        6  Psoriasis        7  Moderate episode of recurrent major depressive disorder ([address] )        8  Other dysphagia        9  Anxiety        10  Hyperglycemia        11  Benign prostatic hyperplasia, unspecified whether lower urinary tract symptoms present        12   Other constipation             Essential tremor  S/p DBS 1/18/2023  Pt has upcoming neurology appt in 2 weeks with neurosurgery on 2/28/2023  Pt locally sees Dr Silvia Rossi Neurology  Please arrange f/u appt with clinics    Tonic clonic convulsion ([address] )  S/p recent DBS placement  Cont keppra 500 mg 1 tab po BID  Pt had his DBS adjusted 1/30/2023  F/u outpatient with Dr Silvia Rossi Neurology  PT/OT to eval/tx      Sinus arrhythmia  Pt with mild arrythymia noted upon ausculation  Unlikely afib but to double check will check EKG in am  Pt had tachycardia when he was at rehab; resolved  Per records, EKG 1/31 showed rate 92, normal sinus with some widened QRS complex  HR stable at 80    Fracture of multiple ribs of left side  Pt had cpr done while in post-ictal state  F/u outpatient with ortho    Psoriasis  Cont calcipotrience-betameth diprop foam apply to b/l legs daily  F/u outpatient with derm    Moderate episode of recurrent major depressive disorder ([facility] [address] )  Wife passed away on 12/2018  Pt proposed to her on valentine's day  Pt stable & happy with daughter  Cont paroxetine 30 mg 1 tab po qam    Other dysphagia  Per records from [facility], pt underwent a MBS on 1/31/2023  Speech recommended regular diet with mildy thickened liquids    Anxiety  Stable  Pt smiling with daughter  Cont paroxetine 30 mg 1 tab po daily    Hyperglycemia  1/30/2023 Hga1c 6 2  Pt did not tolerate metformin due to diarrhea  Follow hypoglycemia protocol    BPH (benign prostatic hyperplasia)  Cont terazosin 2 mg 1 tab po QHS    Other constipation  Stable  Cont colace 100 mg 1 tab po BID  Cont senna 2 tabs po QHS  Cont miralax daily prn          Chief Complaint     Here for therapy    HPI   Pt is 77 y/o right handed M with PMH essential tremor s/p deep brain stimulator  He presented to [facility] on 1/21/2023 after having a tonic clonic seizure  Daughter performed CPR on him due to agonal breathing  Pt had CT head done which revealed possible R corticol SAH vs streak artifact  Neurosurgery evaluated the pt & cleared pt for discharge to rehab on 1/23/2023  He was then discharged to [facility] in [place], [place] from 1/23/2023-2/7/2023 for rehabilitation  Pt had essential tremor as of 2009  He was treated with primidone & metoprolol for HTN  Gabapentin was also tried  It worsened around 2020  His speech and swallowing got worse  Thus pt daughter took pt to [address]  Pt underwent b/l VIM deep brain stimulator on 1/10/2022  Per [facility] neurology notes, pt's right hand tremor improved after programmed in May but it only lasted a few weeks  Later on hardware got infected  He underwent left neck wound debridement and washout 6/15/22 for skin breakdown with exposed wire  Daughter says antibiotics did not work   Then the deep brain stimulator was removed 10/3/2022  He underwent wound washout and debridement  Pt was infection free in December says daughter  The infection cleared  Pt underwent another deep brain stimulator on 1/18/2023  Then on 1/21/2023, pt had a seizure and pt's daughter performed CPR  Pt flown to [place] for his neurosurgeon isaura  Pt was in the hospital for 3 days then transferred to [facility] rehab  Pt admitted to [facility] on 2/14/2023  Pt seen and examined today  Pt A&Ox3  Daughter at bedside answering questions  Both confirm DNR/DNI  Pt has broken ribs from recent CPR  Pt has psoriasis on his legs  Pt here for therapy  Pt has broken left clavicle for which his left arm must remain in NWB           Past Medical History:   Diagnosis Date   • Hypercholesterolemia    • Intermittent claudication ([facility] [address] )    • Memory loss     last assessed 07/16/14   • Mild cognitive impairment     last assessed 06/22/16   • Vitamin D deficiency        Past Surgical History:   Procedure Laterality Date   • TONSILLECTOMY AND ADENOIDECTOMY         Social History     Tobacco Use   Smoking Status Former   • Types: Cigars   • Quit date: 07/2007   • Years since quitting: 15 6   Smokeless Tobacco Former          Family History   Problem Relation Age of Onset   • Melanoma Father         skin        Allergies   Allergen Reactions   • Cefadroxil Headache   • Pioglitazone    • Sulfa Antibiotics    • Victoza [Liraglutide] Rash          Current Outpatient Medications:   •  acetaminophen (TYLENOL) 325 mg tablet, Take 650 mg by mouth every 6 (six) hours as needed, Disp: , Rfl:   •  bisacodyl (DULCOLAX) 10 mg suppository, Insert 10 mg into the rectum, Disp: , Rfl:   •  Docusate Sodium (DSS) 100 MG CAPS, Take 100 mg by mouth 2 (two) times a day, Disp: , Rfl:   •  levETIRAcetam (KEPPRA) 500 mg tablet, Take 500 mg by mouth 2 (two) times a day, Disp: , Rfl:   •  multivitamin-minerals therapeutic (THERA M PLUS), Take 1 tablet by mouth daily, Disp: , Rfl:   • polyethylene glycol (GLYCOLAX) 17 GM/SCOOP powder, Take 17 g by mouth, Disp: , Rfl:   •  senna (SENOKOT) 8 6 MG tablet, Take 17 2 mg by mouth daily, Disp: , Rfl:   •  terazosin (HYTRIN) 2 mg capsule, Take 2 mg by mouth daily, Disp: , Rfl:   •  vitamin B-12 (CYANOCOBALAMIN) 50 MCG TABS, Take 2 tablets (100 mcg total) by mouth daily, Disp: 30 tablet, Rfl: 5  •  azelastine (ASTELIN) 0 1 % nasal spray, 2 sprays into each nostril 2 (two) times a day Use in each nostril as directed (Patient not taking: No sig reported), Disp: 90 mL, Rfl: 3  •  Calcipotriene-Betameth Diprop 0 005-0 064 % FOAM, Apply up to BID, Disp: , Rfl:   •  Cholecalciferol (Vitamin D3) 50 MCG (2000 UT) capsule, Take 1 capsule (2,000 Units total) by mouth daily, Disp: , Rfl: 0  •  fluticasone (FLONASE) 50 mcg/act nasal spray, 2 sprays into each nostril daily (Patient taking differently: 2 sprays into each nostril as needed), Disp: 16 g, Rfl: 5  •  Multiple Vitamin (multivitamin) tablet, Take 1 tablet by mouth daily, Disp: , Rfl:   •  PARoxetine (PAXIL) 30 mg tablet, Take 1 tablet (30 mg total) by mouth daily, Disp: 90 tablet, Rfl: 3    Updated list was reviewed in [facility] system of facility  Review of Systems   All other systems reviewed and are negative  Vitals: Reviewed in [address] [place] [address] system  VSS    129 /  69 mmHg 2/14/2023    Temperature: 98 0 °F    Pulse: 80 bpm    Respirations: 18 Breaths/min    Blood Sugar:     O2 Saturation: 97 0 %    Height: 67 0 Inches      General: Awake, A&Ox3  Head: normocephalic, old surgical scars intact  Cardiovascular: RRR  Lungs: Clear to auscultation bilaterally  Abdomen: nontender/nondistended, +BS  Legs: no cyanosis, clubbing or edema  Skin: clean, dry; b/l shins with psoriasis  Lt foot - clean, dry, intact; heel area with a lot of dry skin  Pt with Lt arm in sling  Psych: calm, cooperative  Neuro: no focal neuro deficits      Diagnostic Data       Recent labs and imaging studies were reviewed  Code Status:    DNR/DNI    Additional notes: Total time spent on H&P 59 minutes in reviewing discharge paperwork from the hospital, interpreting test results from hospital medical records, and documenting information in the medical record  I spoke at length with pt's daughter who provided collateral history  In addition, I provided counseling to the patient and encouraged them to work with physical therapy  Gave orders for patient's medications and nursing home admission orders      This note was electronically signed by Dr Domingo Slight

## 2023-02-16 ENCOUNTER — TELEPHONE (OUTPATIENT)
Dept: NEUROLOGY | Facility: CLINIC | Age: 78
End: 2023-02-16

## 2023-02-16 NOTE — TELEPHONE ENCOUNTER
Syl Choi from [facility] called  She was confirming patients appointment on 2/22/23 with Dr Bucio Standing however Syl Choi stated they cannot get the patient to the office at 7am because they don't open until 7am      Please assist with an appointment   Syl Choi can be reached at [phone]

## 2023-02-16 NOTE — TELEPHONE ENCOUNTER
Good morning, my name is [employer]  My father is a patient there with Dr Raya Kelsey  His name is Mariza Tatum junior  His birthday is [birth year]  I am his daughter  Earlier last month my dad went through the 2nd operation for the the placement for the leads in his head for his essential tremor  He went through that operation, came home 2nd day had a brain seizure was flown to [place] then to rehab  Currently he is at [facility] rehab  He has a follow up appointment that is currently made at [place] neurology for another tuning  It is another It is already turned on  It's just another tuning he needs  Were hoping to move that appointment to Dr Marget Cowden now that he is back in [place], [place]  Again, his name is Mikayla Wayne  [birth year] is his [birth year]  My name is Delmar Taylor  Please call me back  His appointment is scheduled at [place] for the 28th of February at 1030  I need that appointment to be with Dr Marget Cowden around that same day or around there as soon as possible, because the tremor is getting better  He's gonna need that tune to help dial that tremor and help him at rehab  Please give me a call back  My name is [employer] at [phone]  And also I did call like a year or 2 ago with my father on the phone and he gave you permission to talk to me about medical advice, so that should be in your notes as well  So again, my name is [employer] [phone]  Thank you  Called [phone], spoke w/ Mazin Hall to let her know that I receive her message  She is looking to schedule an appt w/ Dr Marget Cowden    Advised that I will forward her message to Dr Marget Cowden and Dominik Uribe  She verbalized understanding

## 2023-02-16 NOTE — TELEPHONE ENCOUNTER
Spoke to [employer]  They have found pt transportation  He will keep the 7:00 2/22 appt with Dr Lesia Arreola

## 2023-02-16 NOTE — TELEPHONE ENCOUNTER
[address] and left message for Bo regarding patients appointment on 2/22/23  Informed Bo that provider is requesting more time with the patient and the appointment on 2/22/23 does not allow for that  Asked Bo to return call to the office to reschedule the appointment  If Call is returned please schedule patient for 90 minutes on 3/9/23 with Dr Hortensia Solano in [facility]

## 2023-02-16 NOTE — TELEPHONE ENCOUNTER
Jaciel Troy and offered appointment on 3/9/23 with Dr Shayne Pires in [address] [place] [address]  Earlene Wilkerson asked if there was something sooner as she would like for him to be seen as soon as possible  Informed that the only availability would be on 2/22/23 at 7 am  Earlene Wilkerson accepted appointment and patient is now scheduled

## 2023-02-17 NOTE — TELEPHONE ENCOUNTER
Called and spoke with Dolores Spurling from [facility]  Informed that we needed to reschedule patients appointment due to the provider wanting more time with the patient       Patient has been rescheduled for 3/9/23 at 8 am for 90 minutes

## 2023-02-27 ENCOUNTER — NURSING HOME VISIT (OUTPATIENT)
Dept: GERIATRICS | Facility: OTHER | Age: 78
End: 2023-02-27

## 2023-02-27 ENCOUNTER — TELEPHONE (OUTPATIENT)
Dept: OBGYN CLINIC | Facility: HOSPITAL | Age: 78
End: 2023-02-27

## 2023-02-27 DIAGNOSIS — G40.409 TONIC CLONIC CONVULSION (HCC): ICD-10-CM

## 2023-02-27 DIAGNOSIS — S22.42XD CLOSED FRACTURE OF MULTIPLE RIBS OF LEFT SIDE WITH ROUTINE HEALING, SUBSEQUENT ENCOUNTER: ICD-10-CM

## 2023-02-27 DIAGNOSIS — R47.1 DYSARTHRIA: ICD-10-CM

## 2023-02-27 DIAGNOSIS — G25.0 ESSENTIAL TREMOR: Primary | ICD-10-CM

## 2023-02-27 NOTE — TELEPHONE ENCOUNTER
Caller: Yonis Bhatti    Doctor: n/a    Reason for call:  Will call back with more information on the patient    Call back#: [phone]

## 2023-02-28 NOTE — PROGRESS NOTES
[facility]  [address]  [phone]  [facility]  POS [address]      NAME: Tere Wilkerson  AGE: 78 y o  SEX: male [id number]    DATE OF ENCOUNTER: 2/27/2023    Assessment and Plan     1  Essential tremor        2  Closed fracture of multiple ribs of left side with routine healing, subsequent encounter        3  Tonic clonic convulsion (HCC)        4  Dysarthria           Essential tremor  Hx since 2009  Failed primidone/metoprolol for the tremors  S/p b/l VIM DBS 1/10/2022  S/p Removal of DBS 10/3/2022  1/18/2023 s/p dbs placement  Keep Neuro appt with Dr Silvia Rossi on 2/28/2023      Fracture of multiple ribs of left side  S/p CPR  Pt due for xrays  Pt needs local ortho; clinics working on it  Check xray ribs/clavicle for f/u    Tonic clonic convulsion ([facility] [address] )  S/p repeat DBS placement on 1/18/2023  S/p DBS adjusment on 1/30/2023  Cont keppra 500 mg 1 tab po BID  [facility]    Dysarthria  Appears stable  Pt getting face/mouth nerve stimulation test done by speech/swallow  Cont care per speech & swallow therapy       Code status: DNR/DNI    Chief Complaint     Routine skilled follow-up visit  History of Present Illness   Pt seen and examined as part of skilled f/u visit  Pt getting nerve stimulation therapy by speech at bedside  Pt's left arm in sling  Pt A&Ox3  Pt feels fine  The following portions of the patient's history were reviewed and updated as appropriate: allergies, current medications, past family history, past medical history, past social history, past surgical history and problem list     Review of Systems     Review of Systems   All other systems reviewed and are negative        Active Problem List     Patient Active Problem List   Diagnosis   • Essential tremor   • Diabetic retinopathy ([address] )   • Eczema   • Essential hypertension   • Impotence of organic origin   • Psoriasis   • DAY (obstructive sleep apnea)   • Vitamin B12 deficiency • Vitamin D deficiency   • Type 2 diabetes mellitus with ophthalmic complication, without long-term current use of insulin (HCC)   • Moderate episode of recurrent major depressive disorder (HCC)   • Dyslipidemia   • Sinus arrhythmia   • Rhinorrhea   • Abnormal weight loss   • Chronic maxillary sinusitis   • Presence of neurostimulator   • Infection of skin   • Tonic clonic convulsion (HCC)   • Fracture of multiple ribs of left side   • Dysarthria   • Other dysphagia   • Anxiety   • Hyperglycemia   • BPH (benign prostatic hyperplasia)   • Other constipation       Objective     Vitals: Reviewed in [employer] system  VSS    General: Awake, A&Ox3  Head: atraumatic, normocephalic  Face: pt has pads on lower face/mouth & getting nerve stimulatin by speech therapy  Cardiovascular: RRR  Lungs: Clear to auscultation bilaterally  Abdomen: nontender/nondistended, +BS  Legs: no cyanosis, clubbing or edema  Skin: clean, dry, intact, left arm in sling  Psych: calm, cooperative    Pertinent Laboratory/Diagnostic Studies:  Refer to facility chart  Current Medications   Medications reviewed and updated in facility chart      Cornell Heck MD  Internal Medicine  Senior Care Physician

## 2023-03-05 NOTE — ASSESSMENT & PLAN NOTE
Appears stable  Pt getting face/mouth nerve stimulation test done by speech/swallow  Cont care per speech & swallow therapy

## 2023-03-05 NOTE — ASSESSMENT & PLAN NOTE
S/p repeat DBS placement on 1/18/2023  S/p DBS adjusment on 1/30/2023  Cont keppra 500 mg 1 tab po BID  [facility]

## 2023-03-05 NOTE — ASSESSMENT & PLAN NOTE
S/p CPR  Pt due for xrays  Pt needs local ortho; clinics working on it  Check xray ribs/clavicle for f/u

## 2023-03-05 NOTE — ASSESSMENT & PLAN NOTE
Hx since 2009  Failed primidone/metoprolol for the tremors  S/p b/l VIM DBS 1/10/2022  S/p Removal of DBS 10/3/2022  1/18/2023 s/p dbs placement  Keep Neuro appt with Dr Silvia Rossi on 2/28/2023

## 2023-03-09 ENCOUNTER — OFFICE VISIT (OUTPATIENT)
Dept: NEUROLOGY | Facility: CLINIC | Age: 78
End: 2023-03-09

## 2023-03-09 VITALS — DIASTOLIC BLOOD PRESSURE: 60 MMHG | HEART RATE: 102 BPM | SYSTOLIC BLOOD PRESSURE: 118 MMHG

## 2023-03-09 DIAGNOSIS — Z96.82 PRESENCE OF NEUROSTIMULATOR: ICD-10-CM

## 2023-03-09 DIAGNOSIS — G25.0 ESSENTIAL TREMOR: Primary | ICD-10-CM

## 2023-03-09 NOTE — ASSESSMENT & PLAN NOTE
Patient presented today s/p DBS IPG replacement 1/18/23  Over 81 minutes spent on deep brain stimulation programming  See body of note for clinical detail and final settings  At end of visit, left hand tremor fairly well controlled  Able to  cup and drink  Right hand tremor with partial control   More improvement of proximal tremor on Group C (New)  Left VIM   PW90, 160Hz, 2 6mA  1a:0 7  2a:0 9    Right VIM  PW90, 160Hz, 2 3mA   9abc(0 8x3)    If having right hand worsening tremor or worsening speech, will have him use his patient  to switch to Group A (alternate)  Left VIM  PW90, 150Hz, 4mA  2abc (1 3x3)    Right VIM  Right VIM  PW90, 160Hz, 2 3mA   9abc(0 8x3)

## 2023-03-09 NOTE — PATIENT INSTRUCTIONS
DBS settings left on New (Group C)    If you develop worsening right hand tremor or speech, will have you use patient  to Alternate (group A)

## 2023-03-09 NOTE — PROGRESS NOTES
Review of Systems   Constitutional: Negative  Negative for appetite change and fever  HENT: Positive for trouble swallowing and voice change (ongoing)  Negative for hearing loss and tinnitus  Eyes: Negative  Negative for photophobia, pain and visual disturbance  Respiratory: Negative  Negative for shortness of breath  Cardiovascular: Negative  Negative for palpitations  Gastrointestinal: Negative  Negative for nausea and vomiting  Endocrine: Negative  Negative for cold intolerance  Genitourinary: Negative  Negative for dysuria, frequency and urgency  Musculoskeletal: Negative for gait problem, myalgias and neck pain  Skin: Negative  Negative for rash  Allergic/Immunologic: Negative  Neurological: Positive for tremors (hands)  Negative for dizziness, seizures, syncope, facial asymmetry, speech difficulty, weakness, light-headedness, numbness and headaches  Hematological: Bruises/bleeds easily (Bruise)  Psychiatric/Behavioral: Negative  Negative for confusion, hallucinations and sleep disturbance  All other systems reviewed and are negative

## 2023-03-09 NOTE — PROGRESS NOTES
Patient ID: Karan Schofield  is a 78 y o  male    Assessment/Plan:    Essential tremor  Patient presented today s/p DBS IPG replacement 1/18/23  Over 81 minutes spent on deep brain stimulation programming  See body of note for clinical detail and final settings  At end of visit, left hand tremor fairly well controlled  Able to  cup and drink  Right hand tremor with partial control  More improvement of proximal tremor on Group C (New)  Left VIM   PW90, 160Hz, 2 6mA  1a:0 7  2a:0 9    Right VIM  PW90, 160Hz, 2 3mA   9abc(0 8x3)    If having right hand worsening tremor or worsening speech, will have him use his patient  to switch to Group A (alternate)  Left VIM  PW90, 150Hz, 4mA  2abc (1 3x3)    Right VIM  Right VIM  PW90, 160Hz, 2 3mA   9abc(0 8x3)      Diagnoses and all orders for this visit:    Essential tremor    Presence of neurostimulator        Subjective:      Joni Gama is a right-handed man with essential tremor who present for follow up  To review, action tremor started around 2009 in the right hand and spread to his left hand  Treated with primidone and metoprolol for HTN  He underwent L MRI focused ultrasound at [facility] neurosurgery 1/2020 but unfortunately tremor improvement was short lived only lasting 1 month   Last seen 8/2021  He was continued on primidone 200 mg twice daily (benefit unclear)  Gabapentin up 300mg bid added  Topiramate  and zonisamide avoided given recent weight loss  Referred to [facility] where he underwent bilateral VIM DBS 1/10/22  Speech changes with higher stimulation  He underwent left neck wound debridement and washout 6/15/22   Review of chart reveals he underwent DBS lead removal with replacement/ revision performed 1/18/23  On 1/21/23 he had a generalized tonic clonic seizure lasting 3 minutes  He was postictal with weak pulses and underwent CPR for 5 min with return to baseline   He suffered a clavicular fracture and multiple rib fractures  CT head showed increased SDH as compared to prior imaging felt to be the cause of seizure,  He was transferred to inpatient rehab  He continues on Keppra for seizure prophylaxis  He returns today with his son  Discharged from rehab yesterday  DBS was turned on after surgery  They state it needs to be adjusted as he continues to have bilateral hand tremors  No initial programming note notes available  He was told left lead was placed deeper given prior scar tissue  Tremor interferes with eating, drinking and writing  He is using larger , heavier utensils  Objective:    /60 (BP Location: Right arm, Patient Position: Standing, Cuff Size: Large)   Pulse 102       Physical Exam  Vitals reviewed  Eyes:      Extraocular Movements: Extraocular movements intact  Neurological Exam  Mental Status   Oriented only to person, place and situation  Recent and remote memory are intact  Speech: Mild slurring of speech    Follows complex commands  Attention and concentration are normal     Cranial Nerves  CN III, IV, VI: Extraocular movements intact bilaterally  Right pupil: 1 mm  Left pupil: 1 mm  CN VII: Full and symmetric facial movement  CN VIII: Hearing is normal   CN IX, X: Palate elevates symmetrically  CN XI: Shoulder shrug strength is normal   CN XII: Tongue midline without atrophy or fasciculations  Motor   Normal muscle tone  Strength is 5/5 in all four extremities except as noted  Left hip flexion 4/5  Sensory  Light touch is normal in upper and lower extremities  Coordination    Moderate amplitude intentional bilaterally  No rest tremor  No postural tremor  No head or vocal tremor    Gait    Walks with cane  Drags left foot  Reduced left arm swing given clavicle fracture        Deep brain stimulation programming:  Start: 7:50am  End  9:11pm  [employer] PC- Flypaper  Implanted: Jan 17th, 2022  Battery : 81%  EBL: 2 years 11 months  Impedance: ok     Group A  Left VIM   2abc-, PW90, 135Hz, 3 9mA  Increased to 4 0 no change  Surveyed 3-, PW90, 150Hz up to 4 3mA no improvement  1abc-, PW90, 150Hz  1 7mA mild reduction in tremor  2mA distal ataxia, slurred speech higher amplitude   slurring, returned to 90  2Abc-, 90 , 150, 4mA    Right VIM  9abc-, PW90, 135Hz, 2 3mA   Increased amplitude slowly to 2 6 with ataxia  Returned to 2 3  Increased Freq slowly to 150Hz with improved tremor, no worsening of speech        Current Outpatient Medications on File Prior to Visit   Medication Sig Dispense Refill   • acetaminophen (TYLENOL) 325 mg tablet Take 650 mg by mouth every 6 (six) hours as needed     • Calcipotriene-Betameth Diprop 0 005-0 064 % FOAM Apply up to BID 60 g 0   • Cholecalciferol 25 MCG (1000 UT) tablet Take 1 tablet (1,000 Units total) by mouth daily Vitamin d3 30 tablet 0   • Docusate Sodium (DSS) 100 MG CAPS Take 100 mg by mouth 2 (two) times a day     • fluticasone (FLONASE) 50 mcg/act nasal spray 2 sprays into each nostril daily (Patient taking differently: 2 sprays into each nostril as needed) 16 g 5   • levETIRAcetam (KEPPRA) 500 mg tablet Take 1 tablet (500 mg total) by mouth 2 (two) times a day 60 tablet 0   • Multiple Vitamin (multivitamin) tablet Take 1 tablet by mouth daily     • multivitamin-minerals therapeutic (THERA M PLUS) Take 1 tablet by mouth daily 30 tablet 0   • PARoxetine (PAXIL) 30 mg tablet Take 1 tablet (30 mg total) by mouth daily 30 tablet 0   • senna (SENOKOT) 8 6 MG tablet Take 17 2 mg by mouth daily     • terazosin (HYTRIN) 2 mg capsule Take 1 capsule (2 mg total) by mouth daily 30 capsule 0   • vitamin B-12 (CYANOCOBALAMIN) 50 MCG TABS Take 2 tablets (100 mcg total) by mouth daily 30 tablet 1   • azelastine (ASTELIN) 0 1 % nasal spray 2 sprays into each nostril 2 (two) times a day Use in each nostril as directed (Patient not taking: Reported on 3/17/2022) 90 mL 3   • bisacodyl (DULCOLAX) 10 mg suppository Insert 10 mg into the rectum (Patient not taking: Reported on 3/9/2023)     • [] polyethylene glycol (GLYCOLAX) 17 GM/SCOOP powder Take 17 g by mouth       No current facility-administered medications on file prior to visit           Silvia Lu MD  Movement disorder physician  [address] [place] [address]

## 2023-03-15 NOTE — PROGRESS NOTES
Cardiology Follow Up    Thomas Hurley   1945  [id number]  [address] [facility] [place] [id number] [phone]    1  PVC (premature ventricular contraction)  AMB extended holter monitor      2  Abnormal EKG        3  Dysarthria        4  Essential tremor        5  Neurologic gait dysfunction        6  Seizure ([address] )        7  DAY on CPAP  POCT ECG           Interval History:   Essential tremor sp stage I and stage II deep brain stimulator placement for essential tremor on 1/10 - 1/18/23 ( Discharge summary not available to review) DBS lead placement on 1/18/23 CT post procedure good placement of leads  Increased right subarachnoid hemorrhage from  Prior exam, small amount of intraparenchymal hemorrhage at the terminal end of the right DBS lead  Mr Mateus Price was admitted to [facility] ED on 1/21/23 after his family witnessed tonic clonic seizure for 3 minutes  He became postictal with a weak pulse  CPR performed for 5 minutes due to agonal breathing  EMS was called and on arrival adequate pulse but postictal   On present station to ED AO x 4  He endorsed chest discomfort,oxygen saturation 97% on RA  No focal neurological deficits  Neurology consulted  He was treated with 1g Keppra, troponin negative, EKG NSR LAD and T wave inversion in V5-V6   1/21/23 EKG NSR with QT prolongation  EKG 1 31 NSR 92 BPM, wide QRS  He was found to have minimally displaced left distal clavicle fracture, multiple rib fractures, no pneumothorax  YOVANY LVEF 50-55%, interventricular conduction delay with abnormal septal wall motion, dilated LA  He was found to have dysarthria and was felt to have vascular dementia  He was discharged to [facility]  He was admitted to [facility] after discharge from [facility]        Mr Mateus Price presents to our office for a recent hospitalization followup visit  He is accompanied by his care giver  Neville Alfred resides with his son and grandson  Neville Alfred is improving every day  He is riding a stationary bike for 30 minutes twice a day  He denies CP, palpitations,  shortness of breath, lightheadedness or dizziness  He is using a quad type of cane to ambulate  He admits to right sides weakness and right sided tremors        Medical History   Primary Cardiologist Dr Tangela Myles   Hypertension  Palpitation  DAY treated with CPAP   DM2 HgbA1C 6 2 on 1/30/23   Essential tremor sp stage I and stage II deep brain stimulator placement for essential tremor on 1/10 - 1/18/23   Patient Active Problem List   Diagnosis   • Essential tremor   • Diabetic retinopathy ([facility] [address] )   • Eczema   • Essential hypertension   • Impotence of organic origin   • Psoriasis   • DAY (obstructive sleep apnea)   • Vitamin B12 deficiency   • Vitamin D deficiency   • Type 2 diabetes mellitus with ophthalmic complication, without long-term current use of insulin (HCC)   • Moderate episode of recurrent major depressive disorder (HCC)   • Dyslipidemia   • Sinus arrhythmia   • Rhinorrhea   • Abnormal weight loss   • Chronic maxillary sinusitis   • Presence of neurostimulator   • Infection of skin   • Tonic clonic convulsion (HCC)   • Fracture of multiple ribs of left side   • Dysarthria   • Other dysphagia   • Anxiety   • Hyperglycemia   • BPH (benign prostatic hyperplasia)   • Other constipation     Past Medical History:   Diagnosis Date   • Hypercholesterolemia    • Intermittent claudication ([facility] [address] )    • Memory loss     last assessed 07/16/14   • Mild cognitive impairment     last assessed 06/22/16   • Vitamin D deficiency      Social History     Socioeconomic History   • Marital status: /Civil Union     Spouse name: Not on file   • Number of children: Not on file   • Years of education: Not on file   • Highest education level: Not on file   Occupational History   • Not on file Tobacco Use   • Smoking status: Former     Types: Cigars     Quit date: 07/2007     Years since quitting: 15 7   • Smokeless tobacco: Former   Vaping Use   • Vaping Use: Never used   Substance and Sexual Activity   • Alcohol use: Not Currently     Comment: social; rare   • Drug use: Never   • Sexual activity: Not on file   Other Topics Concern   • Not on file   Social History Narrative   • Not on file     Social Determinants of Health     Financial Resource Strain: Not on file   Food Insecurity: Not on file   Transportation Needs: Not on file   Physical Activity: Not on file   Stress: Not on file   Social Connections: Not on file   Intimate Partner Violence: Not on file   Housing Stability: Not on file      Family History   Problem Relation Age of Onset   • Melanoma Father         skin     Past Surgical History:   Procedure Laterality Date   • DEEP BRAIN STIMULATOR PLACEMENT Bilateral 01/10/2022   • DEEP BRAIN STIMULATOR PLACEMENT  01/18/2023   • HEAD AND NECK DEBRIDEMENT  06/15/2022    s/p Left neck wound debridement & washout with exposed wire   • TONSILLECTOMY AND ADENOIDECTOMY     • VAGAL NERVE STIMULATOR REMOVAL  10/03/2022    deep brain stimulator removal       Current Outpatient Medications:   •  acetaminophen (TYLENOL) 325 mg tablet, Take 650 mg by mouth every 6 (six) hours as needed, Disp: , Rfl:   •  azelastine (ASTELIN) 0 1 % nasal spray, 2 sprays into each nostril 2 (two) times a day Use in each nostril as directed (Patient not taking: Reported on 3/17/2022), Disp: 90 mL, Rfl: 3  •  bisacodyl (DULCOLAX) 10 mg suppository, Insert 10 mg into the rectum (Patient not taking: Reported on 3/9/2023), Disp: , Rfl:   •  Calcipotriene-Betameth Diprop 0 005-0 064 % FOAM, Apply up to BID, Disp: 60 g, Rfl: 0  •  Cholecalciferol 25 MCG (1000 UT) tablet, Take 1 tablet (1,000 Units total) by mouth daily Vitamin d3, Disp: 30 tablet, Rfl: 0  •  Docusate Sodium (DSS) 100 MG CAPS, Take 100 mg by mouth 2 (two) times a day, Disp: , Rfl:   •  fluticasone (FLONASE) 50 mcg/act nasal spray, 2 sprays into each nostril daily (Patient taking differently: 2 sprays into each nostril as needed), Disp: 16 g, Rfl: 5  •  levETIRAcetam (KEPPRA) 500 mg tablet, Take 1 tablet (500 mg total) by mouth 2 (two) times a day, Disp: 60 tablet, Rfl: 0  •  Multiple Vitamin (multivitamin) tablet, Take 1 tablet by mouth daily, Disp: , Rfl:   •  multivitamin-minerals therapeutic (THERA M PLUS), Take 1 tablet by mouth daily, Disp: 30 tablet, Rfl: 0  •  PARoxetine (PAXIL) 30 mg tablet, Take 1 tablet (30 mg total) by mouth daily, Disp: 30 tablet, Rfl: 0  •  senna (SENOKOT) 8 6 MG tablet, Take 17 2 mg by mouth daily, Disp: , Rfl:   •  terazosin (HYTRIN) 2 mg capsule, Take 1 capsule (2 mg total) by mouth daily, Disp: 30 capsule, Rfl: 0  •  vitamin B-12 (CYANOCOBALAMIN) 50 MCG TABS, Take 2 tablets (100 mcg total) by mouth daily, Disp: 30 tablet, Rfl: 1  Allergies   Allergen Reactions   • Cefadroxil Headache   • Pioglitazone    • Sulfa Antibiotics    • Victoza [Liraglutide] Rash       Labs:  No visits with results within 2 Month(s) from this visit     Latest known visit with results is:   Appointment on 02/22/2022   Component Date Value   • Methylmalonic Acid, S 02/22/2022 152    • Vitamin B-12 02/22/2022 426    • Sodium 02/22/2022 139    • Potassium 02/22/2022 4 0    • Chloride 02/22/2022 104    • CO2 02/22/2022 31    • ANION GAP 02/22/2022 4    • BUN 02/22/2022 18    • Creatinine 02/22/2022 0 73    • Glucose, Fasting 02/22/2022 157 (H)    • Calcium 02/22/2022 9 7    • AST 02/22/2022 17    • ALT 02/22/2022 24    • Alkaline Phosphatase 02/22/2022 122 (H)    • Total Protein 02/22/2022 8 9 (H)    • Albumin 02/22/2022 3 9    • Total Bilirubin 02/22/2022 0 85    • eGFR 02/22/2022 91    • Hemoglobin A1C 02/22/2022 6 3 (H)    • EAG 02/22/2022 134    • WBC 02/22/2022 8 86    • RBC 02/22/2022 4 63    • Hemoglobin 02/22/2022 14 4    • Hematocrit 02/22/2022 45 0    • MCV 02/22/2022 97    • MCH 02/22/2022 31 1    • MCHC 02/22/2022 32 0    • RDW 02/22/2022 14 9    • MPV 02/22/2022 10 6    • Platelets 15/80/9348 316    • nRBC 02/22/2022 0    • Neutrophils Relative 02/22/2022 67    • Immat GRANS % 02/22/2022 1    • Lymphocytes Relative 02/22/2022 22    • Monocytes Relative 02/22/2022 6    • Eosinophils Relative 02/22/2022 3    • Basophils Relative 02/22/2022 1    • Neutrophils Absolute 02/22/2022 5 99    • Immature Grans Absolute 02/22/2022 0 04    • Lymphocytes Absolute 02/22/2022 1 98    • Monocytes Absolute 02/22/2022 0 54    • Eosinophils Absolute 02/22/2022 0 26    • Basophils Absolute 02/22/2022 0 05      Imaging: No results found  Review of Systems:  Review of Systems   HENT:        Dysarthria    Musculoskeletal: Positive for arthralgias, gait problem and myalgias  All other systems reviewed and are negative  Physical Exam:  Physical Exam  Vitals reviewed  Constitutional:       Appearance: Normal appearance  Cardiovascular:      Rate and Rhythm: Normal rate and regular rhythm  Pulses: Normal pulses  Heart sounds: Normal heart sounds  Comments: Frequent ectopic beats   Pulmonary:      Effort: Pulmonary effort is normal       Breath sounds: Normal breath sounds  Abdominal:      General: Bowel sounds are normal       Palpations: Abdomen is soft  Musculoskeletal:         General: Normal range of motion  Right lower leg: No edema  Left lower leg: No edema  Skin:     General: Skin is warm and dry  Capillary Refill: Capillary refill takes less than 2 seconds  Neurological:      Mental Status: He is alert and oriented to person, place, and time  Comments: Right arm tremor with weakness, right LE weakness    Psychiatric:         Mood and Affect: Mood normal          Behavior: Behavior normal          Discussion/Summary:  1  PVC's- EKG shows PVC , on PE with multiple ectopic beats, previously on metoprolol for PVC's   Due to tremors and gait abnormality 24-48 hour Holter would not be appropriate  I have ordered one week Zio patch evaluate HR and rhythm, burden of ectopy  2  abnormal EKG Prolonged QTC per hospital summary, EKG in the office today NSR 88 BPM, QTC 440ms,   3  Essential tremor sp stage I and stage II deep brain stimulator placement for essential tremor on 1/10 - 1/18/23 ( Discharge summary not available to review) DBS lead placement on 1/18/23 CT post procedure good placement of leads  Increased right subarachnoid hemorrhage from  Prior exam, small amount of intraparenchymal hemorrhage at the terminal end of the right DBS lead  Follow up with Neurology   4  Dysarthria and was felt to have vascular dementia  5  Neurological Gait dysfunction  Using a quad cane to ambulate   6  Seizure follow up with neurology continues on Keppra 500mg BID   7   DAY complaint with CPAP

## 2023-03-16 ENCOUNTER — OFFICE VISIT (OUTPATIENT)
Dept: CARDIOLOGY CLINIC | Facility: CLINIC | Age: 78
End: 2023-03-16

## 2023-03-16 VITALS
HEIGHT: 67 IN | SYSTOLIC BLOOD PRESSURE: 136 MMHG | HEART RATE: 88 BPM | OXYGEN SATURATION: 99 % | BODY MASS INDEX: 26.95 KG/M2 | WEIGHT: 171.7 LBS | DIASTOLIC BLOOD PRESSURE: 70 MMHG

## 2023-03-16 DIAGNOSIS — R26.9 NEUROLOGIC GAIT DYSFUNCTION: ICD-10-CM

## 2023-03-16 DIAGNOSIS — G47.33 OSA ON CPAP: ICD-10-CM

## 2023-03-16 DIAGNOSIS — I49.3 PVC (PREMATURE VENTRICULAR CONTRACTION): Primary | ICD-10-CM

## 2023-03-16 DIAGNOSIS — R47.1 DYSARTHRIA: ICD-10-CM

## 2023-03-16 DIAGNOSIS — G25.0 ESSENTIAL TREMOR: ICD-10-CM

## 2023-03-16 DIAGNOSIS — R56.9 SEIZURE (HCC): ICD-10-CM

## 2023-03-16 DIAGNOSIS — R94.31 ABNORMAL EKG: ICD-10-CM

## 2023-03-16 DIAGNOSIS — Z99.89 OSA ON CPAP: ICD-10-CM

## 2023-03-17 ENCOUNTER — OFFICE VISIT (OUTPATIENT)
Dept: OBGYN CLINIC | Facility: CLINIC | Age: 78
End: 2023-03-17

## 2023-03-17 ENCOUNTER — APPOINTMENT (OUTPATIENT)
Dept: RADIOLOGY | Age: 78
End: 2023-03-17

## 2023-03-17 DIAGNOSIS — M25.512 ACUTE PAIN OF LEFT SHOULDER: Primary | ICD-10-CM

## 2023-03-17 DIAGNOSIS — M25.512 ACUTE PAIN OF LEFT SHOULDER: ICD-10-CM

## 2023-03-17 DIAGNOSIS — M75.102 TEAR OF LEFT ROTATOR CUFF, UNSPECIFIED TEAR EXTENT, UNSPECIFIED WHETHER TRAUMATIC: ICD-10-CM

## 2023-03-17 DIAGNOSIS — S42.035A NONDISPLACED FRACTURE OF LATERAL END OF LEFT CLAVICLE, INITIAL ENCOUNTER FOR CLOSED FRACTURE: ICD-10-CM

## 2023-03-17 NOTE — PROGRESS NOTES
Orthopedic Sports Medicine New Patient Visit     Assesment:   78 y o  male with left clavicle fracture    Plan:    Imaging was reviewed and discussed with patient today  His fracture is in appropriate alignment and has a large degree of callus formation  He has no tenderness on exam today  I recommended he start physical therapy for strengthening and range of motion of the shoulder  He would prefer to return as needed in the future  I think this is appropriate given his good degree of healing at this point  He will call back if symptoms change or worsen  Follow up:    Return if symptoms worsen or fail to improve  Chief Complaint   Patient presents with   • Left Shoulder - Fracture       History of Present Illness: The patient is a 78 y o  male who had a fall in January and fractured his clavicle  Patient reports minimal pain but limited ROM  He was seen in [place] where he was getting brain stimulator treatments  He was found to have a left clavicle fracture and was told that it could be treated without surgery  He was never able to follow-up about the clavicle given his other multiple medical conditions that were ongoing  At this point he has minimal pain over the clavicle  He does have some subjective weakness in his shoulder but is getting by perform his ADLs            Past Medical, Social and Family History:  Past Medical History:   Diagnosis Date   • Hypercholesterolemia    • Intermittent claudication ([facility] [address] )    • Memory loss     last assessed 07/16/14   • Mild cognitive impairment     last assessed 06/22/16   • Vitamin D deficiency      Past Surgical History:   Procedure Laterality Date   • DEEP BRAIN STIMULATOR PLACEMENT Bilateral 01/10/2022   • DEEP BRAIN STIMULATOR PLACEMENT  01/18/2023   • HEAD AND NECK DEBRIDEMENT  06/15/2022    s/p Left neck wound debridement & washout with exposed wire   • TONSILLECTOMY AND ADENOIDECTOMY     • VAGAL NERVE STIMULATOR REMOVAL  10/03/2022    deep brain stimulator removal     Allergies   Allergen Reactions   • Cefadroxil Headache   • Pioglitazone    • Sulfa Antibiotics    • Victoza [Liraglutide] Rash     Current Outpatient Medications on File Prior to Visit   Medication Sig Dispense Refill   • acetaminophen (TYLENOL) 325 mg tablet Take 650 mg by mouth every 6 (six) hours as needed (Patient not taking: Reported on 3/16/2023)     • azelastine (ASTELIN) 0 1 % nasal spray 2 sprays into each nostril 2 (two) times a day Use in each nostril as directed (Patient not taking: Reported on 3/17/2022) 90 mL 3   • bisacodyl (DULCOLAX) 10 mg suppository Insert 10 mg into the rectum (Patient not taking: Reported on 3/9/2023)     • Calcipotriene-Betameth Diprop 0 005-0 064 % FOAM Apply up to BID 60 g 0   • Cholecalciferol 25 MCG (1000 UT) tablet Take 1 tablet (1,000 Units total) by mouth daily Vitamin d3 30 tablet 0   • Docusate Sodium (DSS) 100 MG CAPS Take 100 mg by mouth 2 (two) times a day     • fluticasone (FLONASE) 50 mcg/act nasal spray 2 sprays into each nostril daily (Patient not taking: Reported on 3/16/2023) 16 g 5   • levETIRAcetam (KEPPRA) 500 mg tablet Take 1 tablet (500 mg total) by mouth 2 (two) times a day 60 tablet 0   • Multiple Vitamin (multivitamin) tablet Take 1 tablet by mouth daily     • multivitamin-minerals therapeutic (THERA M PLUS) Take 1 tablet by mouth daily 30 tablet 0   • PARoxetine (PAXIL) 30 mg tablet Take 1 tablet (30 mg total) by mouth daily 30 tablet 0   • senna (SENOKOT) 8 6 MG tablet Take 17 2 mg by mouth daily     • terazosin (HYTRIN) 2 mg capsule Take 1 capsule (2 mg total) by mouth daily 30 capsule 0   • vitamin B-12 (CYANOCOBALAMIN) 50 MCG TABS Take 2 tablets (100 mcg total) by mouth daily 30 tablet 1     No current facility-administered medications on file prior to visit       Social History     Socioeconomic History   • Marital status: /Civil Union     Spouse name: Not on file   • Number of children: Not on file   • Years of education: Not on file   • Highest education level: Not on file   Occupational History   • Not on file   Tobacco Use   • Smoking status: Every Day     Types: Cigars     Last attempt to quit: 07/2007     Years since quitting: 15 7   • Smokeless tobacco: Former   Vaping Use   • Vaping Use: Never used   Substance and Sexual Activity   • Alcohol use: Not Currently     Comment: social; rare   • Drug use: Never   • Sexual activity: Not on file   Other Topics Concern   • Not on file   Social History Narrative   • Not on file     Social Determinants of Health     Financial Resource Strain: Not on file   Food Insecurity: Not on file   Transportation Needs: Not on file   Physical Activity: Not on file   Stress: Not on file   Social Connections: Not on file   Intimate Partner Violence: Not on file   Housing Stability: Not on file       I have reviewed the past medical, surgical, social and family history, medications and allergies as documented in the EMR  Review of systems negative other than the HPI  Physical Exam:    There were no vitals taken for this visit      Inspection: Visible callus at the midshaft clavicle without significant deformity  Palpation: No tenderness at fracture site  Sensory - SILT in the Radial / Ulnar / Median / Axillary nerve distributions  Motor - AIN / PIN / Radial / Ulnar / Median / Axillary motor nerves in tact  Palpable Radial pulse  Cap refill <2secs in all digits      Imaging    I reviewed and interpreted X-rays of the left shoulder which show healing left midshaft clavicle fracture with appropriate alignment and large degree of callus formation

## 2023-03-22 ENCOUNTER — TELEPHONE (OUTPATIENT)
Dept: FAMILY MEDICINE CLINIC | Facility: CLINIC | Age: 78
End: 2023-03-22

## 2023-03-22 NOTE — TELEPHONE ENCOUNTER
Date/Time: 3-22-23 / 9: 27 AM    Ammy Matute (Speech Therapist) called stating she was with Mica Martel and he is requesting an order be placed in his chart for a video swallow study with speech therapy  Please send to Central Scheduling once complete

## 2023-03-24 NOTE — TELEPHONE ENCOUNTER
LM for pt that Dr Dheeraj Reed will see him in a few weeks and will discuss the appropriate testing with him at his appt

## 2023-03-29 ENCOUNTER — RA CDI HCC (OUTPATIENT)
Dept: OTHER | Facility: HOSPITAL | Age: 78
End: 2023-03-29

## 2023-03-29 NOTE — PROGRESS NOTES
[facility]  coding opportunities          Chart Reviewed number of suggestions sent to Provider: 2     Patients Insurance     Medicare Insurance: Medicare        [id number]  [id number]

## 2023-04-04 ENCOUNTER — CLINICAL SUPPORT (OUTPATIENT)
Dept: CARDIOLOGY CLINIC | Facility: CLINIC | Age: 78
End: 2023-04-04

## 2023-04-04 DIAGNOSIS — I49.3 PVC (PREMATURE VENTRICULAR CONTRACTION): ICD-10-CM

## 2023-04-05 ENCOUNTER — OFFICE VISIT (OUTPATIENT)
Dept: FAMILY MEDICINE CLINIC | Facility: CLINIC | Age: 78
End: 2023-04-05

## 2023-04-05 VITALS
BODY MASS INDEX: 26.06 KG/M2 | OXYGEN SATURATION: 92 % | DIASTOLIC BLOOD PRESSURE: 80 MMHG | SYSTOLIC BLOOD PRESSURE: 130 MMHG | TEMPERATURE: 98.6 F | WEIGHT: 166 LBS | HEART RATE: 78 BPM | HEIGHT: 67 IN

## 2023-04-05 DIAGNOSIS — E53.8 VITAMIN B12 DEFICIENCY: ICD-10-CM

## 2023-04-05 DIAGNOSIS — F33.1 MODERATE EPISODE OF RECURRENT MAJOR DEPRESSIVE DISORDER (HCC): ICD-10-CM

## 2023-04-05 DIAGNOSIS — E78.5 DYSLIPIDEMIA: ICD-10-CM

## 2023-04-05 DIAGNOSIS — R13.19 OTHER DYSPHAGIA: ICD-10-CM

## 2023-04-05 DIAGNOSIS — G40.409 TONIC CLONIC CONVULSION (HCC): ICD-10-CM

## 2023-04-05 DIAGNOSIS — E55.9 VITAMIN D DEFICIENCY: ICD-10-CM

## 2023-04-05 DIAGNOSIS — D64.9 ANEMIA, UNSPECIFIED TYPE: ICD-10-CM

## 2023-04-05 DIAGNOSIS — E11.319 TYPE 2 DIABETES MELLITUS WITH RETINOPATHY OF BOTH EYES, WITHOUT LONG-TERM CURRENT USE OF INSULIN, MACULAR EDEMA PRESENCE UNSPECIFIED, UNSPECIFIED RETINOPATHY SEVERITY (HCC): Primary | ICD-10-CM

## 2023-04-05 DIAGNOSIS — R79.89 TSH ELEVATION: ICD-10-CM

## 2023-04-05 DIAGNOSIS — R47.1 DYSARTHRIA: ICD-10-CM

## 2023-04-05 DIAGNOSIS — N40.0 BENIGN PROSTATIC HYPERPLASIA, UNSPECIFIED WHETHER LOWER URINARY TRACT SYMPTOMS PRESENT: ICD-10-CM

## 2023-04-05 DIAGNOSIS — Z96.82 PRESENCE OF NEUROSTIMULATOR: ICD-10-CM

## 2023-04-05 DIAGNOSIS — L40.9 PSORIASIS: ICD-10-CM

## 2023-04-05 DIAGNOSIS — R73.9 HYPERGLYCEMIA: ICD-10-CM

## 2023-04-05 DIAGNOSIS — S42.025D CLOSED NONDISPLACED FRACTURE OF SHAFT OF LEFT CLAVICLE WITH ROUTINE HEALING, SUBSEQUENT ENCOUNTER: ICD-10-CM

## 2023-04-05 DIAGNOSIS — S22.42XD CLOSED FRACTURE OF MULTIPLE RIBS OF LEFT SIDE WITH ROUTINE HEALING, SUBSEQUENT ENCOUNTER: ICD-10-CM

## 2023-04-05 PROBLEM — S42.022A CLOSED FRACTURE OF SHAFT OF LEFT CLAVICLE: Status: ACTIVE | Noted: 2023-02-07

## 2023-04-05 PROBLEM — E11.9 TYPE 2 DIABETES MELLITUS (HCC): Status: ACTIVE | Noted: 2019-09-23

## 2023-04-05 PROBLEM — R56.9 SEIZURES (HCC): Status: ACTIVE | Noted: 2023-01-21

## 2023-04-05 PROBLEM — L08.9 INFECTION OF SKIN: Status: RESOLVED | Noted: 2022-05-19 | Resolved: 2023-04-05

## 2023-04-05 PROBLEM — J32.0 CHRONIC MAXILLARY SINUSITIS: Status: RESOLVED | Noted: 2021-11-24 | Resolved: 2023-04-05

## 2023-04-05 LAB — SL AMB POCT HEMOGLOBIN AIC: 6.4 (ref ?–6.5)

## 2023-04-05 RX ORDER — PAROXETINE 30 MG/1
30 TABLET, FILM COATED ORAL DAILY
Qty: 90 TABLET | Refills: 3 | Status: SHIPPED | OUTPATIENT
Start: 2023-04-05

## 2023-04-05 RX ORDER — TERAZOSIN 2 MG/1
2 CAPSULE ORAL
Qty: 90 CAPSULE | Refills: 3 | Status: SHIPPED | OUTPATIENT
Start: 2023-04-05 | End: 2023-05-05

## 2023-04-05 RX ORDER — LEVETIRACETAM 500 MG/1
500 TABLET ORAL 2 TIMES DAILY
Qty: 180 TABLET | Refills: 3 | Status: SHIPPED | OUTPATIENT
Start: 2023-04-05 | End: 2023-05-05

## 2023-04-05 NOTE — PROGRESS NOTES
Assessment/Plan:       Problem List Items Addressed This Visit        Digestive    Other dysphagia     Check a swallow study and continue to work closely with speech therapy  Relevant Orders    FL barium swallow video w speech       Endocrine    Type 2 diabetes mellitus ([facility] [address] ) - Primary       Lab Results   Component Value Date    HGBA1C 6 4 04/05/2023   Continue close monitoring of A1c  Relevant Orders    POCT hemoglobin A1c (Completed)    Comprehensive metabolic panel    Hemoglobin A1C    IRIS Diabetic eye exam       Musculoskeletal and Integument    Psoriasis     I refilled his topical betamethasone calcipotriene foam today  I suggest dermatology follow-up but he would like to hold off on that at this time  Relevant Medications    Calcipotriene-Betameth Diprop 0 005-0 064 % FOAM    Fracture of multiple ribs of left side     This occurred after his seizure in January  He appears to be clinically healing at this point  Closed fracture of shaft of left clavicle     He recently saw orthopedics and seems to be healing appropriately  Genitourinary    BPH (benign prostatic hyperplasia)    Relevant Medications    terazosin (HYTRIN) 2 mg capsule       Other    Vitamin B12 deficiency    Relevant Orders    Vitamin B12    Vitamin D deficiency    Relevant Orders    Vitamin D 25 hydroxy    Moderate episode of recurrent major depressive disorder (HCC)     His mood seems to be relatively stable at this time  Relevant Medications    PARoxetine (PAXIL) 30 mg tablet    Other Relevant Orders    Comprehensive metabolic panel    CBC and differential    TSH, 3rd generation with Free T4 reflex    Dyslipidemia    Relevant Orders    Comprehensive metabolic panel    Presence of neurostimulator    Tonic clonic convulsion ([facility] [address] [address] )     Continue Keppra at this time  Continue close follow-up with neurology           Relevant Medications    levETIRAcetam (KEPPRA) 500 mg tablet    Dysarthria "Relevant Orders    FL barium swallow video w speech    Hyperglycemia    Relevant Orders    Hemoglobin A1C   Other Visit Diagnoses     TSH elevation        Relevant Orders    TSH, 3rd generation with Free T4 reflex    Anemia, unspecified type        Relevant Orders    CBC and differential    Iron, TIBC and Ferritin Panel            Subjective:      Patient ID: Caty Jackson  is a 78 y o  male  HPI     Patient presents today for follow-up for recent hospitalizations  He was hospitalized at [facility] after seizure activity was noted as follows from 1/21/2023 with a discharged to rehabilitation facility [facility] on 1/23/2023, ultimately discharged home on 2/7/2023:      72-year-old male with PMHx significant for essential tremor s/p stage I and stage II deep brain stimulator placement for essential tremor (on 1/10/23 and 1/18/23 respectfully) who presented to [facility] emergency department on 1/21/2023 after patient was witnessed to have a tonic-clonic seizure for 3 minutes witnessed by family  Patient then became postictal and had \"weak pulses\" per family  CPR was performed for 5 minutes due to agonal breathing    Hospital course complicated by chest x-ray which showed evidence of minimally displaced left distal third clavicle fracture, multiple left rib fractures, no pneumothorax  Patient to remain NWB LUE, sling for 2-4 weeks  CT brain/head showed streak artifact versus R cortical SAH adjacent to DBS leads and streak artifact versus punctate IPH at termination of R DBS lead  No surgical intervention recommended, continue Keppra 500 mg BID  TTE showed EF 50-55%, interventricular conduction delay w/ abnormal septal wall motion, dilated LA  Patient experienced some urinary retention, urinalysis noninfectious appearing  Patient medically stabilized  He was seen and evaluated by PT/OT and deemed an approproiate candidate for inpatient rehab   Patient wad discharged to [facility] on 1/23/2023 under the care of Dr Gisel Peñaloza "Drew Nassar MD       The patient presents today for a follow-up  He is still having some difficulty with speech as well as swallowing  This has been present since his stimulators intracranial procedures  He is working with speech therapy and they have requested a swallow study  He feels as though he is doing pretty well otherwise  He is having a bit of psoriatic outbreak on his lower extremities and requests a refill on his topical agent        The following portions of the patient's history were reviewed and updated as appropriate: allergies, current medications, past family history, past medical history, past social history, past surgical history and problem list       Current Outpatient Medications:   •  bisacodyl (DULCOLAX) 10 mg suppository, Insert 10 mg into the rectum, Disp: , Rfl:   •  Calcipotriene-Betameth Diprop 0 005-0 064 % FOAM, Apply up to BID, Disp: 180 g, Rfl: 3  •  Cholecalciferol 25 MCG (1000 UT) tablet, Take 1 tablet (1,000 Units total) by mouth daily Vitamin d3, Disp: 30 tablet, Rfl: 0  •  Docusate Sodium (DSS) 100 MG CAPS, Take 100 mg by mouth 2 (two) times a day, Disp: , Rfl:   •  levETIRAcetam (KEPPRA) 500 mg tablet, Take 1 tablet (500 mg total) by mouth 2 (two) times a day, Disp: 180 tablet, Rfl: 3  •  Multiple Vitamin (multivitamin) tablet, Take 1 tablet by mouth daily, Disp: , Rfl:   •  PARoxetine (PAXIL) 30 mg tablet, Take 1 tablet (30 mg total) by mouth daily, Disp: 90 tablet, Rfl: 3  •  senna (SENOKOT) 8 6 MG tablet, Take 17 2 mg by mouth daily, Disp: , Rfl:   •  terazosin (HYTRIN) 2 mg capsule, Take 1 capsule (2 mg total) by mouth daily at bedtime, Disp: 90 capsule, Rfl: 3  •  vitamin B-12 (CYANOCOBALAMIN) 50 MCG TABS, Take 2 tablets (100 mcg total) by mouth daily, Disp: 30 tablet, Rfl: 1     Review of Systems      Objective:      /80 (BP Location: Left arm, Patient Position: Sitting, Cuff Size: Large)   Pulse 78   Temp 98 6 °F (37 °C)   Ht 5' 7\" (1 702 m)   Wt 75 3 kg (166 " lb)   SpO2 92%   BMI 26 00 kg/m²          Physical Exam      [facility], MD

## 2023-04-05 NOTE — PROGRESS NOTES
Diabetic Foot Exam    Patient's shoes and socks removed  Right Foot/Ankle   Right Foot Inspection  Skin Exam: dry skin, callus, abnormal color and callus  Toe Exam: ROM and strength within normal limits  Sensory   Monofilament testing: diminished    Vascular  Capillary refills: elevated  The right DP pulse is 2+  The right PT pulse is 1+  Left Foot/Ankle  Left Foot Inspection  Skin Exam: dry skin, abnormal color and callus  Toe Exam: ROM and strength within normal limits  Sensory   Monofilament testing: diminished    Vascular  Capillary refills: elevated  The left DP pulse is 1+  The left PT pulse is 1+       Assign Risk Category  No deformity present  Loss of protective sensation  Weak pulses  Risk: 2

## 2023-04-06 ENCOUNTER — TELEPHONE (OUTPATIENT)
Dept: CARDIOLOGY CLINIC | Facility: CLINIC | Age: 78
End: 2023-04-06

## 2023-04-06 DIAGNOSIS — I47.29 NSVT (NONSUSTAINED VENTRICULAR TACHYCARDIA) (HCC): Primary | ICD-10-CM

## 2023-04-06 RX ORDER — METOPROLOL SUCCINATE 25 MG/1
25 TABLET, EXTENDED RELEASE ORAL
Qty: 90 TABLET | Refills: 3 | Status: SHIPPED | OUTPATIENT
Start: 2023-04-06

## 2023-04-06 NOTE — ASSESSMENT & PLAN NOTE
I refilled his topical betamethasone calcipotriene foam today  I suggest dermatology follow-up but he would like to hold off on that at this time

## 2023-04-06 NOTE — TELEPHONE ENCOUNTER
----- Message from Scarlett Yousif, [address] sent at 4/6/2023 12:30 PM EDT -----  Please call Maximiliano Acosta and inform him the zio patch showed irregular heart beats  Start Metoprolol 25mg daily, do not take the same time as Paxil  Preferably take Metoprolol at bedtime    Spoke with pt re: Kaz rslts  Infomed pt to start Metoprolol 25mg @[facility]  Pt will comply

## 2023-04-06 NOTE — ASSESSMENT & PLAN NOTE
continue close follow-up with neurology  Unfortunately, he still has a very significant essential tremor of his right hand

## 2023-04-06 NOTE — ASSESSMENT & PLAN NOTE
Continue close follow-up with his ophthalmologist   Lab Results   Component Value Date    HGBA1C 6 4 04/05/2023

## 2023-04-06 NOTE — ASSESSMENT & PLAN NOTE
He remains clinically stable on Keppra at this time  I did reach out to his movement specialist to see if she would be comfortable continuing to follow him for this seizure episode  I refilled Keppra in the meantime

## 2023-04-07 LAB
LEFT EYE DIABETIC RETINOPATHY: ABNORMAL
LEFT EYE IMAGE QUALITY: ABNORMAL
LEFT EYE MACULAR EDEMA: ABNORMAL
LEFT EYE OTHER RETINOPATHY: ABNORMAL
RIGHT EYE DIABETIC RETINOPATHY: ABNORMAL
RIGHT EYE IMAGE QUALITY: ABNORMAL
RIGHT EYE MACULAR EDEMA: ABNORMAL
RIGHT EYE OTHER RETINOPATHY: ABNORMAL
SEVERITY (EYE EXAM): ABNORMAL

## 2023-04-27 NOTE — TELEPHONE ENCOUNTER
Upon review of the In Basket request we were able to locate, review, and update the patient chart as requested for eGFR  Any additional questions or concerns should be emailed to the Practice Liaisons via the appropriate education email address, please do not reply via In Basket      Thank you  Roselind Schilder, MA

## 2023-05-22 ENCOUNTER — TELEPHONE (OUTPATIENT)
Dept: NEUROLOGY | Facility: CLINIC | Age: 78
End: 2023-05-22

## 2023-05-22 NOTE — TELEPHONE ENCOUNTER
Pt called in stating he was returning a call from our office asking him to call back  I dont seen any notes

## 2023-05-23 ENCOUNTER — TELEPHONE (OUTPATIENT)
Dept: NEUROLOGY | Facility: CLINIC | Age: 78
End: 2023-05-23

## 2023-05-23 NOTE — TELEPHONE ENCOUNTER
Called patient and [facility] stating that I am calling in regards to seeing if the patient is confirming the upcoming appt on 06/22/2023  I then asked the patient to please give us a call back to get the appt confirmed for him

## 2023-05-23 NOTE — TELEPHONE ENCOUNTER
Called patient and [facility] stating that I am calling in regards to seeing if the patient is confirming the upcoming appt on 06/22/2023  I then asked the patient to please give us a call back to get the appt confirmed for him

## 2023-06-15 NOTE — PROGRESS NOTES
Cardiology Office Note  MD Nadine Luna MD, Alana Szymanski DO, MD Dede Montes De Oca DO, Raven Eaton DO, [facility] [place]  ----------------------------------------------------------------  [address] [place] Kayla Payton 78 y o  male MRN: [id number]  Unit/Bed#:  Encounter: [id number]      History of Present Illness: It was a pleasure to see Bryce Blizzard  in the office today for follow-up CV evaluation  He has a past medical history of seizures, hypertension, dyslipidemia, type 2 diabetes mellitus, DAY and depression/anxiety  He established care with me in June 2023  He previously followed with Dr Caleb Cruz  Patient had tonic-clonic seizures in January 2023  He was admitted to [facility] and was treated with Keppra  ECG was performed showing lateral T wave abnormalities  YOVANY was performed during the hospitalization showing low normal left ventricular systolic function without severe valvular disease  And had some underlying vascular dementia  The patient to an rehabilitation and then eventually to rehabilitation  In the office in March 2023, he was noted to have PVCs  Zio patch was ordered  Zio patch demonstrated evidence of nonsustained ventricular tachycardia with the longest run of 8 beats and occasional PVCs  In the past, he was noted by Dr Caleb Cruz to have some atrial tachycardia  No atrial tachycardia was noted on the monitor  He denies any chest pain, pressure, tightness or squeezing  Denies lightheadedness, dizziness or palpitations  Denies lower extremity swelling, orthopnea or paroxysmal nocturnal dyspnea  Review of Systems:  Review of Systems   Constitutional: Negative for decreased appetite, fever, weight gain and weight loss  HENT: Negative for congestion and sore throat  Eyes: Negative for visual disturbance     Cardiovascular: Negative for chest pain, dyspnea on exertion, leg swelling, near-syncope and palpitations  Respiratory: Negative for cough and shortness of breath  Hematologic/Lymphatic: Negative for bleeding problem  Skin: Negative for rash  Musculoskeletal: Negative for myalgias and neck pain  Gastrointestinal: Negative for abdominal pain and nausea  Neurological: Negative for light-headedness and weakness  Psychiatric/Behavioral: Negative for depression         Past Medical History:   Diagnosis Date   • Chronic maxillary sinusitis 11/24/2021   • Hypercholesterolemia    • Intermittent claudication (HCC)    • Memory loss     last assessed 07/16/14   • Mild cognitive impairment     last assessed 06/22/16   • Vitamin D deficiency        Past Surgical History:   Procedure Laterality Date   • DEEP BRAIN STIMULATOR PLACEMENT Bilateral 01/10/2022   • DEEP BRAIN STIMULATOR PLACEMENT  01/18/2023   • HEAD AND NECK DEBRIDEMENT  06/15/2022    s/p Left neck wound debridement & washout with exposed wire   • TONSILLECTOMY AND ADENOIDECTOMY     • VAGAL NERVE STIMULATOR REMOVAL  10/03/2022    deep brain stimulator removal       Social History     Socioeconomic History   • Marital status: /Civil Union     Spouse name: None   • Number of children: None   • Years of education: None   • Highest education level: None   Occupational History   • None   Tobacco Use   • Smoking status: Every Day     Types: Cigars     Last attempt to quit: 07/2007     Years since quitting: 15 9   • Smokeless tobacco: Former   Vaping Use   • Vaping Use: Never used   Substance and Sexual Activity   • Alcohol use: Not Currently     Comment: social; rare   • Drug use: Never   • Sexual activity: None   Other Topics Concern   • None   Social History Narrative   • None     Social Determinants of Health     Financial Resource Strain: Not on file   Food Insecurity: Not on file   Transportation Needs: Not on file   Physical Activity: Not on file   Stress: Not on file   Social Connections: Not on file Intimate Partner Violence: Not on file   Housing Stability: Not on file       Family History   Problem Relation Age of Onset   • Melanoma Father         skin       Allergies   Allergen Reactions   • Cefadroxil Headache   • Pioglitazone    • Sulfa Antibiotics    • Victoza [Liraglutide] Rash         Current Outpatient Medications:   •  betamethasone dipropionate (DIPROSONE) 0 05 % cream, Apply topically 2 (two) times a day, Disp: , Rfl:   •  Cholecalciferol 25 MCG (1000 UT) tablet, Take 1 tablet (1,000 Units total) by mouth daily Vitamin d3, Disp: 30 tablet, Rfl: 0  •  Docusate Sodium (DSS) 100 MG CAPS, Take 100 mg by mouth 2 (two) times a day, Disp: , Rfl:   •  levETIRAcetam (KEPPRA) 500 mg tablet, Take 1 tablet (500 mg total) by mouth 2 (two) times a day, Disp: 180 tablet, Rfl: 3  •  metoprolol succinate (TOPROL-XL) 25 mg 24 hr tablet, Take 1 tablet (25 mg total) by mouth daily at bedtime, Disp: 90 tablet, Rfl: 3  •  Multiple Vitamin (multivitamin) tablet, Take 1 tablet by mouth daily, Disp: , Rfl:   •  PARoxetine (PAXIL) 30 mg tablet, Take 1 tablet (30 mg total) by mouth daily, Disp: 90 tablet, Rfl: 3  •  terazosin (HYTRIN) 2 mg capsule, Take 1 capsule (2 mg total) by mouth daily at bedtime, Disp: 90 capsule, Rfl: 3  •  vitamin B-12 (CYANOCOBALAMIN) 50 MCG TABS, Take 2 tablets (100 mcg total) by mouth daily, Disp: 30 tablet, Rfl: 1  •  bisacodyl (DULCOLAX) 10 mg suppository, Insert 10 mg into the rectum (Patient not taking: Reported on 4/19/2023), Disp: , Rfl:   •  Calcipotriene-Betameth Diprop 0 005-0 064 % FOAM, Apply up to BID (Patient not taking: Reported on 6/16/2023), Disp: 180 g, Rfl: 3  •  senna (SENOKOT) 8 6 MG tablet, Take 17 2 mg by mouth daily (Patient not taking: Reported on 4/19/2023), Disp: , Rfl:     Vitals:    06/16/23 1045   BP: 122/60   BP Location: Right arm   Patient Position: Sitting   Cuff Size: Adult   Pulse: 61   Weight: 78 kg (172 lb)     Body mass index is 26 94 kg/m²      PHYSICAL EXAMINATION:  Gen: Awake, Alert, NAD   Head/eyes: AT/NC, pupils equal and round, Anicteric  ENT: mmm  Neck: Supple, No elevated JVP, trachea midline  Resp: CTA bilaterally no w/r/r  CV: RRR +S1, S2, No m/r/g  Abd: Soft, NT/ND + BS  Ext: no LE edema bilaterally  Neuro: Follows commands, moves all extermities  Psych: Appropriate affect, normal mood, pleasant attitude, non-combative  Skin: warm; no rash, erythema or venous stasis changes on exposed skin    --------------------------------------------------------------------------------  TREADMILL STRESS  No results found for this or any previous visit      --------------------------------------------------------------------------------  NUCLEAR STRESS TEST: No results found for this or any previous visit  No results found for this or any previous visit       --------------------------------------------------------------------------------  CATH:  No results found for this or any previous visit     --------------------------------------------------------------------------------  ECHO:   Results for orders placed during the hospital encounter of 11/30/17    Echo complete with contrast if indicated    Narrative  [address] [facility]  Al[address] Sharma [address]  [facility], [address]  [phone]    Transthoracic Echocardiogram  2D, M-mode, Doppler, and Color Doppler    Study date:  2017    Patient: Yumiko Garg  MR number: [id number]  Account number: [de-identified]  : 1945  Age: 81 years  Gender: Male  Status: Outpatient  Location: [facility]  Height: 68 in  Weight: 229 lb  BP: 124/ 68 mmHg    Indications: cardiac arrhythmia   Palpitations    Diagnoses: I49 9 - Cardiac arrhythmia, unspecified    Sonographer:  ADELA Hogan  Primary Physician:  Kaye Salinas MD  Referring Physician:  Cricket Severance, MD  Group:  [facility]  Interpreting Physician:  Corwin Phipps MD    SUMMARY    LEFT VENTRICLE:  Systolic function was normal  Ejection fraction was estimated to be 60 %  Although no diagnostic regional wall motion abnormality was identified, this possibility cannot be completely excluded on the basis of this study  There was mild concentric hypertrophy  Doppler parameters were consistent with abnormal left ventricular relaxation (grade 1 diastolic dysfunction)  AORTA:  The root exhibited upper limit of normal size (4 cm at the Sinsuses of Valsalva - 3 6 cm above the Sinsuses)    HISTORY: PRIOR HISTORY: Risk factors: hypertension, diabetes, hypercholesterolemia, and a former history of cigarette use (quitting more than one month ago)  PROCEDURE: The study was performed in the [address] [place] [address]  This was a routine study  The transthoracic approach was used  The study included complete 2D imaging, M-mode, complete spectral Doppler, and color Doppler  The  heart rate was 87 bpm, at the start of the study  LEFT VENTRICLE: Size was normal  Systolic function was normal  Ejection fraction was estimated to be 60 %  Although no diagnostic regional wall motion abnormality was identified, this possibility cannot be completely excluded on the basis  of this study  There was mild concentric hypertrophy  DOPPLER: Doppler parameters were consistent with abnormal left ventricular relaxation (grade 1 diastolic dysfunction)  RIGHT VENTRICLE: The size was normal  Systolic function was normal     LEFT ATRIUM: Size was normal     RIGHT ATRIUM: Size was normal     MITRAL VALVE: Valve structure was normal  DOPPLER: There was no evidence for stenosis  There was no regurgitation  AORTIC VALVE: The valve was trileaflet  Leaflets exhibited mild calcification  DOPPLER: There was no evidence for stenosis  There was no regurgitation  TRICUSPID VALVE: The valve structure was normal  DOPPLER: There was no regurgitation   The tricuspid jet envelope definition was inadequate for estimation of RV systolic pressure  There are no indirect findings (abnormal RV volume or  geometry, altered pulmonary flow velocity profile, or leftward septal displacement) which would suggest moderate or severe pulmonary hypertension  PULMONIC VALVE: Not well visualized  PERICARDIUM: There was no pericardial effusion  AORTA: The root exhibited upper limit of normal size (4 cm at the Sinsuses of Valsalva - 3 6 cm above the Sinsuses)    SYSTEMIC VEINS: IVC: The inferior vena cava was normal in size  Respirophasic changes were normal     SYSTEM MEASUREMENT TABLES    2D  %FS: 29 1 %  Ao Diam: 4 cm  EDV(Teich): 125 6 ml  EF(Teich): 55 5 %  ESV(Teich): 55 9 ml  IVSd: 1 1 cm  LA Area: 17 5 cm2  LA Diam: 3 5 cm  LVEDV MOD A4C: 109 1 ml  LVEF MOD A4C: 50 7 %  LVESV MOD A4C: 53 8 ml  LVIDd: 5 1 cm  LVIDs: 3 6 cm  LVLd A4C: 8 4 cm  LVLs A4C: 7 4 cm  LVPWd: 1 cm  RA Area: 14 7 cm2  RVIDd: 3 4 cm  SV MOD A4C: 55 3 ml  SV(Teich): 69 7 ml    MM  TAPSE: 2 5 cm    PW  AVC: 354 1 ms  E': 0 1 m/s  E/E': 9 7  MV A Hermann: 0 8 m/s  MV Dec [place]: 4 3 m/s2  MV DecT: 147 8 ms  MV E Hermann: 0 6 m/s  MV E/A Ratio: 0 8  MV PHT: 42 9 ms  MVA By PHT: 5 1 cm2    [facility]    Prepared and electronically signed by    Grover Marx MD  Signed [id number] 12:18:57    No results found for this or any previous visit     --------------------------------------------------------------------------------  HOLTER  No results found for this or any previous visit  Results for orders placed during the hospital encounter of 17    Holter monitor - 48 hour    Narrative  PT NAME: Lorenza Murry  : 1945  AGE: 81 y o    GENDER: male  MRN: [id number]   PROCEDURE: Holter monitor - 48 hour        INDICATIONS: Palpitations      FINDINGS:    Holter monitoring revealed predominant sinus rhythm with an average heart rate of  97 BPM, a minimum heart rate of  62 BPM, and a maximum heart rate of  145 BPM     There were about 1186 ventricular ectopic beats, mostly occurring as single PVC's with no evidence of ventricular tachycardia  There was 4 beats of ventricular bigeminy  There was a 4 beat run of nonsustained ventricular tachycardia  There were about 25 supraventricular ectopic beats, mostly occurring as single PAC's and late beats with no evidence of atrial fibrillation or atrial flutter  For approximately 7-8 hours in the afternoon to evening on the 2nd day, the heart rate was increased in the 130s to 140s range  It appears that the heart rate abruptly increased and then abruptly decreased which suggests a supraventricular tachycardia  The morphology is most consistent with sinus tachycardia or atrial tachycardia  There was no evidence of significant bradyarrhythmia or advanced heart block  The longest R-R interval was  1 4 seconds  The patient's symptom diary reported no symptoms      --------------------------------------------------------------------------------  CAROTIDS  No results found for this or any previous visit      --------------------------------------------------------------------------------  ECGs:  Results for orders placed or performed in visit on 06/16/23   POCT ECG    Impression    Sinus rhythm 61 bpm, incomplete LBBB        Lab Results   Component Value Date    WBC 8 86 02/22/2022    HGB 14 4 02/22/2022    HCT 45 0 02/22/2022    MCV 97 02/22/2022     02/22/2022      Lab Results   Component Value Date    SODIUM 139 02/22/2022    K 4 0 02/22/2022     02/22/2022    CO2 31 02/22/2022    BUN 18 02/22/2022    CREATININE 0 44 01/24/2023    GLUC 305 (H) 01/06/2017    CALCIUM 9 7 02/22/2022      Lab Results   Component Value Date    HGBA1C 6 4 04/05/2023      Lab Results   Component Value Date    CHOL 153 04/24/2014    CHOL 121 09/16/2013     Lab Results   Component Value Date    HDL 62 08/03/2021    HDL 35 (L) 03/11/2019    HDL 47 01/06/2017     Lab Results   Component Value "Date    LDLCALC 73 08/03/2021    [address] 75 03/11/2019    LDLCALC 88 01/06/2017     Lab Results   Component Value Date    TRIG 91 08/03/2021    TRIG 86 03/11/2019    TRIG 83 01/06/2017     No results found for: \"CHOLHDL\"   No results found for: \"INR\", \"PROTIME\"     1  NSVT (nonsustained ventricular tachycardia) ([facility])  -     NM myocardial perfusion spect (rx stress and/or rest); Future; Expected date: 06/16/2023  -     metoprolol succinate (TOPROL-XL) 25 mg 24 hr tablet; Take 1 tablet (25 mg total) by mouth daily at bedtime    2  PVC (premature ventricular contraction)  -     POCT ECG    3  Paroxysmal atrial tachycardia ([facility] )    4  Abnormal EKG  -     NM myocardial perfusion spect (rx stress and/or rest); Future; Expected date: 06/16/2023    5  Essential hypertension    6  Type 2 diabetes mellitus with retinopathy of both eyes, macular edema presence unspecified, unspecified retinopathy severity, unspecified whether long term insulin use ([facility] )    7  Dyslipidemia        IMPRESSION:  • NSVT/PVCs  o Event recorder w/ SR avg HR 90 bpm, rare PACs, rare atrial couplets/triplets, occasional PVCs with 1 1%, rare ventricular couplets/triplets, nonsustained VT x3 with longest run 8 beats at 193 bpm, nonsustained PSVT with longest run 11 beats at 137 bpm, no triggered events noted, April 2023  • History of paroxysmal atrial tachycardia  • Abnormal ECG w/ iLBBB  • Hypertension  o LVEF 50-55%, mild LVH, paradoxical septal wall motion, mild RV dilatation with normal function, severe LA dilatation, AV sclerosis, mild MAC, January 2023  • Dyslipidemia  • Type 2 diabetes mellitus  • Tonic-clonic seizures on Keppra  • Essential tremors s/p deep brain stimulator, January 2023  • Depression/anxiety    PLAN:  It was a pleasure to see Nu Chilel in the office today for follow-up CV evaluation  He is here today due to his history of nonsustained ventricular tachycardia, PVCs, hypertension and abnormal ECG    He has no chest pain concerning for " "angina and no signs or symptoms of heart failure  Clinically examines to be euvolemic  ECG demonstrates incomplete left bundle branch block  He has been tolerating his current medications without any reported adverse effects  He can perform greater than 4 METS on a daily basis without significant exertional symptoms  Based on his clinical presentation, I have the following recommendations:    1  Due to his findings of nonsustained ventricular tachycardia, diabetes and abnormal ECG, I would check a pharmacologic nuclear stress test to assess for any evidence of underlying myocardial ischemia  Patient has been instructed to continue taking his Keppra around the time of stress testing and to not miss a dose  Additionally, his tonic-clonic seizures occurred due to an issue with the location of his deep brain stimulator rather than epileptic disorder  Patient is likely reasonable for regadenoson on stress test   2   Recommend heart healthy diet low in sodium and carbohydrate  3   Continue current antihypertensive regimen with metoprolol for both blood pressure control and for ectopy  4   Echocardiogram from January 2023 showed normal left ventricular systolic function without severe valvular disease  No repeat echocardiogram at this time  5   Should he have any significant palpitations, chest discomfort or overwhelming shortness of breath, recommend seeking immediate medical attention  6   We will follow-up with him after stress test to review the results  As always, please do not hesitate to call with any questions  Portions of the record may have been created with voice recognition software  Occasional wrong word or \"sound a like\" substitutions may have occurred due to the inherent limitations of voice recognition software  Read the chart carefully and recognize, using context, where substitutions have occurred        Signed: Gavino Gates DO, FACC, JORGE, FACP  "

## 2023-06-16 ENCOUNTER — OFFICE VISIT (OUTPATIENT)
Dept: CARDIOLOGY CLINIC | Facility: CLINIC | Age: 78
End: 2023-06-16
Payer: MEDICARE

## 2023-06-16 VITALS
BODY MASS INDEX: 26.94 KG/M2 | DIASTOLIC BLOOD PRESSURE: 60 MMHG | SYSTOLIC BLOOD PRESSURE: 122 MMHG | HEART RATE: 61 BPM | WEIGHT: 172 LBS

## 2023-06-16 DIAGNOSIS — E11.319 TYPE 2 DIABETES MELLITUS WITH RETINOPATHY OF BOTH EYES, MACULAR EDEMA PRESENCE UNSPECIFIED, UNSPECIFIED RETINOPATHY SEVERITY, UNSPECIFIED WHETHER LONG TERM INSULIN USE (HCC): ICD-10-CM

## 2023-06-16 DIAGNOSIS — I49.3 PVC (PREMATURE VENTRICULAR CONTRACTION): ICD-10-CM

## 2023-06-16 DIAGNOSIS — E78.5 DYSLIPIDEMIA: ICD-10-CM

## 2023-06-16 DIAGNOSIS — I10 ESSENTIAL HYPERTENSION: ICD-10-CM

## 2023-06-16 DIAGNOSIS — I47.1 PAROXYSMAL ATRIAL TACHYCARDIA (HCC): ICD-10-CM

## 2023-06-16 DIAGNOSIS — R94.31 ABNORMAL EKG: ICD-10-CM

## 2023-06-16 DIAGNOSIS — I47.29 NSVT (NONSUSTAINED VENTRICULAR TACHYCARDIA) (HCC): Primary | ICD-10-CM

## 2023-06-16 PROCEDURE — 99214 OFFICE O/P EST MOD 30 MIN: CPT | Performed by: INTERNAL MEDICINE

## 2023-06-16 PROCEDURE — 93000 ELECTROCARDIOGRAM COMPLETE: CPT | Performed by: INTERNAL MEDICINE

## 2023-06-16 RX ORDER — METOPROLOL SUCCINATE 25 MG/1
25 TABLET, EXTENDED RELEASE ORAL
Qty: 90 TABLET | Refills: 3 | Status: SHIPPED | OUTPATIENT
Start: 2023-06-16

## 2023-06-22 ENCOUNTER — PROCEDURE VISIT (OUTPATIENT)
Dept: NEUROLOGY | Facility: CLINIC | Age: 78
End: 2023-06-22
Payer: MEDICARE

## 2023-06-22 VITALS
DIASTOLIC BLOOD PRESSURE: 60 MMHG | BODY MASS INDEX: 26.63 KG/M2 | SYSTOLIC BLOOD PRESSURE: 118 MMHG | HEART RATE: 70 BPM | WEIGHT: 170 LBS

## 2023-06-22 DIAGNOSIS — Z96.82 PRESENCE OF NEUROSTIMULATOR: ICD-10-CM

## 2023-06-22 DIAGNOSIS — R47.1 DYSARTHRIA: ICD-10-CM

## 2023-06-22 DIAGNOSIS — G25.0 ESSENTIAL TREMOR: Primary | ICD-10-CM

## 2023-06-22 PROCEDURE — 95984 ALYS BRN NPGT PRGRMG ADDL 15: CPT | Performed by: PSYCHIATRY & NEUROLOGY

## 2023-06-22 PROCEDURE — 95983 ALYS BRN NPGT PRGRMG 15 MIN: CPT | Performed by: PSYCHIATRY & NEUROLOGY

## 2023-06-22 NOTE — PROGRESS NOTES
Patient ID: Isaías Baer  is a 78 y o  male    Assessment/Plan:    Essential tremor  Breakthrough tremor of both hands most apparent on the right  Right hand tremor has never been fully controlled on  He has been focusing on using his left hand for all activities  Development of mild left hand tremor when picking up objects and eating  50 minutes spent in deep brain stimulation programming  Additional 5 minutes on documentation  See body of clinical note for details  Left on alternate group  Instructed that if he was having issues with the right side, we could switch to his group labeled with new group  End of visit there was good control of left hand  Diagnoses and all orders for this visit:    Essential tremor    Presence of neurostimulator    Dysarthria        Subjective:      Shahnaz Nelson is a right-handed man with essential tremor who present for follow up  To review, action tremor started around 2009 in the right hand and spread to his left hand  Treated with primidone and metoprolol (also for HTN)  He underwent L MRI focused ultrasound at [facility] neurosurgery 1/2020 but unfortunately tremor improvement was short lived only lasting 1 month   Primidone 200 mg twice daily (benefit unclear)  Gabapentin up 300mg bid added  Referred to [facility] neurosurgery where he underwent bilateral VIM DBS 1/10/22  Speech changes with higher stimulation  DBS lead removal with replacement/ revision performed 1/18/23 after left neck wound infection  On 1/21/23 he had a generalized tonic clonic seizure lasting 3 minutes  He was postictal with weak pulses and underwent CPR for 5 min with return to baseline  He suffered a clavicular fracture and multiple rib fractures  CT head showed increased SDH as compared to prior imaging felt to be the cause of seizure  He continues on Keppra for seizure prophylaxis  Right hand tremor is worse  Left hand tremor with mild tremor as well   He is able to use his left hand to use utensils and drink  He is no longer using his right hand to perform any activities given tremor  Has never been completely controlled  He denies any changes in gait  Speech may be a little worse with stimulation  Objective:    /60 (BP Location: Right arm, Patient Position: Sitting, Cuff Size: Standard)   Pulse 70   Wt 77 1 kg (170 lb)   BMI 26 63 kg/m²       Physical Exam  Vitals reviewed  Eyes:      Extraocular Movements: Extraocular movements intact  Pupils: Pupils are equal, round, and reactive to light  Neurological:      Cranial Nerves: Dysarthria present  Motor: Motor strength is normal         Neurological Exam  Mental Status   Oriented only to person, place and situation  Recent and remote memory are intact  Mild dysarthria present  Follows complex commands  Attention and concentration are normal     Cranial Nerves  CN III, IV, VI: Extraocular movements intact bilaterally  Pupils equal round and reactive to light bilaterally  CN VII: Full and symmetric facial movement  CN VIII: Hearing is normal   CN IX, X: Palate elevates symmetrically  CN XI: Shoulder shrug strength is normal   CN XII: Tongue midline without atrophy or fasciculations  Motor   Normal muscle tone  Strength is 5/5 throughout all four extremities  Coordination    Moderate amplitude proximal greater than distal tremor of the right hand on finger-to-nose  Moderate amplitude postural tremor  Mild intentional tremor on finger-to-nose on left  Mild left postural tremor  No head tremor  Mild dysarthria       Gait    Ambulates slowly with reduced right arm swing             Deep brain stimulation programming:  Start: 7:06  End  : 7:55am  [employer]- [employer]  Implanted: Jan 17th, 2022  Battery : 74%  EBL: 3 years  3 month    Impedance: ok       Left VIM   New Group A  1a:0 9  2a:1 3  PW90, 170Hz, 3 9mA(1-4 1)    Reduced PW 60 and  Increased 2:1 5, 1:1 1 mild improvemen       Alternate group A  1a:0 9  1b:0 9  2a:1 3  PW90, 170Hz , 3 9mA (1-4 1)     Right VIM  New and alternate group       Increase  - distal ataxia    Increases in amp- 2 5mA tremor improved      Final settings:   Left VIM   New Group   1a:1mA  2a:1  3mA  PW90, 170Hzm 4mA (1-4 2)     Alternate Group - left on this group    1a:1 3  1b:1 0  2a:1 0  PW60, 170Hz , 4 1mA (1-4 3)    Right VIM  9abc-, PW90, 170Hz, 253mA             Anahi Conde MD  Movement disorder physician  [address] [place] [address]

## 2023-06-22 NOTE — ASSESSMENT & PLAN NOTE
Breakthrough tremor of both hands most apparent on the right  Right hand tremor has never been fully controlled on  He has been focusing on using his left hand for all activities  Development of mild left hand tremor when picking up objects and eating  50 minutes spent in deep brain stimulation programming  Additional 5 minutes on documentation  See body of clinical note for details  Left on alternate group  Instructed that if he was having issues with the right side, we could switch to his group labeled with new group  End of visit there was good control of left hand

## 2023-06-22 NOTE — PROGRESS NOTES
Review of Systems   Constitutional: Negative  Negative for appetite change, fatigue and fever  HENT: Positive for voice change  Negative for hearing loss, tinnitus and trouble swallowing  Eyes: Negative  Negative for photophobia, pain and visual disturbance  Respiratory: Negative  Negative for shortness of breath  Cardiovascular: Negative  Negative for palpitations  Gastrointestinal: Negative  Negative for nausea and vomiting  Endocrine: Negative  Negative for cold intolerance  Genitourinary: Negative  Negative for dysuria, frequency and urgency  Musculoskeletal: Negative for back pain, gait problem, myalgias and neck pain  A little bit of balance issue, has stayed the same     Skin: Negative  Negative for rash  Allergic/Immunologic: Negative  Neurological: Positive for tremors (Both hands, Has gotten worse, Right hand worse than Left) and speech difficulty  Negative for dizziness, seizures, syncope, facial asymmetry, weakness, light-headedness, numbness and headaches  States sometimes is out of it     Hematological: Negative  Does not bruise/bleed easily  Psychiatric/Behavioral: Negative  Negative for confusion, hallucinations and sleep disturbance  All other systems reviewed and are negative

## 2023-06-26 ENCOUNTER — HOSPITAL ENCOUNTER (OUTPATIENT)
Dept: NUCLEAR MEDICINE | Facility: HOSPITAL | Age: 78
Discharge: HOME | End: 2023-06-26
Attending: INTERNAL MEDICINE
Payer: MEDICARE

## 2023-06-26 ENCOUNTER — HOSPITAL ENCOUNTER (OUTPATIENT)
Dept: NON INVASIVE DIAGNOSTICS | Facility: HOSPITAL | Age: 78
Discharge: HOME | End: 2023-06-26
Attending: INTERNAL MEDICINE
Payer: MEDICARE

## 2023-06-26 VITALS — BODY MASS INDEX: 27 KG/M2 | WEIGHT: 172 LBS | HEIGHT: 67 IN

## 2023-06-26 DIAGNOSIS — I47.29 NSVT (NONSUSTAINED VENTRICULAR TACHYCARDIA) (HCC): ICD-10-CM

## 2023-06-26 DIAGNOSIS — R94.31 ABNORMAL EKG: ICD-10-CM

## 2023-06-26 LAB
CHEST PAIN STATEMENT: NORMAL
MAX DIASTOLIC BP: 58 MMHG
MAX HEART RATE: 80 BPM
MAX HR PERCENT: 55 %
MAX HR: 80 BPM
MAX PREDICTED HEART RATE: 145 BPM
MAX. SYSTOLIC BP: 133 MMHG
NUC STRESS EJECTION FRACTION: 54 %
PROTOCOL NAME: NORMAL
RATE PRESSURE PRODUCT: NORMAL
REASON FOR TERMINATION: NORMAL
SL CV REST NUCLEAR ISOTOPE DOSE: 10.46 MCI
SL CV STRESS NUCLEAR ISOTOPE DOSE: 30.3 MCI
SL CV STRESS RECOVERY BP: NORMAL MMHG
SL CV STRESS RECOVERY HR: 74 BPM
SL CV STRESS RECOVERY O2 SAT: 99 %
STRESS ANGINA INDEX: 0
STRESS BASELINE BP: NORMAL MMHG
STRESS BASELINE HR: 62 BPM
STRESS O2 SAT REST: 98 %
STRESS PEAK HR: 80 BPM
STRESS POST ESTIMATED WORKLOAD: 1 METS
STRESS POST EXERCISE DUR MIN: 3 MIN
STRESS POST EXERCISE DUR SEC: 11 SEC
STRESS POST O2 SAT PEAK: 99 %
STRESS POST PEAK BP: 125 MMHG
STRESS/REST PERFUSION RATIO: 1.03
TARGET HR FORMULA: NORMAL
TEST INDICATION: NORMAL
TIME IN EXERCISE PHASE: NORMAL

## 2023-06-26 PROCEDURE — A9502 TC99M TETROFOSMIN: HCPCS

## 2023-06-26 PROCEDURE — 93016 CV STRESS TEST SUPVJ ONLY: CPT | Performed by: INTERNAL MEDICINE

## 2023-06-26 PROCEDURE — 93018 CV STRESS TEST I&R ONLY: CPT | Performed by: INTERNAL MEDICINE

## 2023-06-26 PROCEDURE — 78452 HT MUSCLE IMAGE SPECT MULT: CPT

## 2023-06-26 PROCEDURE — G1004 CDSM NDSC: HCPCS

## 2023-06-26 PROCEDURE — 78452 HT MUSCLE IMAGE SPECT MULT: CPT | Performed by: INTERNAL MEDICINE

## 2023-06-26 PROCEDURE — 93017 CV STRESS TEST TRACING ONLY: CPT

## 2023-06-26 RX ORDER — REGADENOSON 0.08 MG/ML
0.4 INJECTION, SOLUTION INTRAVENOUS ONCE
Status: COMPLETED | OUTPATIENT
Start: 2023-06-26 | End: 2023-06-26

## 2023-06-26 RX ADMIN — REGADENOSON 0.4 MG: 0.08 INJECTION, SOLUTION INTRAVENOUS at 10:49

## 2023-08-10 ENCOUNTER — RA CDI HCC (OUTPATIENT)
Dept: OTHER | Facility: HOSPITAL | Age: 78
End: 2023-08-10

## 2023-08-10 NOTE — PROGRESS NOTES
[address] [facility] coding opportunities          Chart Reviewed number of suggestions sent to Provider: 1  E11.36     Patients Insurance     Medicare Insurance: Medicare

## 2023-08-11 ENCOUNTER — OFFICE VISIT (OUTPATIENT)
Dept: CARDIOLOGY CLINIC | Facility: CLINIC | Age: 78
End: 2023-08-11
Payer: MEDICARE

## 2023-08-11 VITALS
SYSTOLIC BLOOD PRESSURE: 114 MMHG | HEART RATE: 69 BPM | BODY MASS INDEX: 26.37 KG/M2 | WEIGHT: 168 LBS | DIASTOLIC BLOOD PRESSURE: 60 MMHG | OXYGEN SATURATION: 97 % | HEIGHT: 67 IN

## 2023-08-11 DIAGNOSIS — I47.1 PAROXYSMAL ATRIAL TACHYCARDIA (HCC): ICD-10-CM

## 2023-08-11 DIAGNOSIS — E11.319 TYPE 2 DIABETES MELLITUS WITH RETINOPATHY OF BOTH EYES, MACULAR EDEMA PRESENCE UNSPECIFIED, UNSPECIFIED RETINOPATHY SEVERITY, UNSPECIFIED WHETHER LONG TERM INSULIN USE (HCC): ICD-10-CM

## 2023-08-11 DIAGNOSIS — I10 ESSENTIAL HYPERTENSION: ICD-10-CM

## 2023-08-11 DIAGNOSIS — I47.29 NSVT (NONSUSTAINED VENTRICULAR TACHYCARDIA) (HCC): Primary | ICD-10-CM

## 2023-08-11 DIAGNOSIS — I49.3 PVC (PREMATURE VENTRICULAR CONTRACTION): ICD-10-CM

## 2023-08-11 DIAGNOSIS — E78.5 DYSLIPIDEMIA: ICD-10-CM

## 2023-08-11 PROCEDURE — 99214 OFFICE O/P EST MOD 30 MIN: CPT | Performed by: INTERNAL MEDICINE

## 2023-08-11 NOTE — PATIENT INSTRUCTIONS
Pharmacologic nuclear stress test was found to be negative for evidence of severe blockage within the heart arteries with normal function of the heart on gated imaging.   No additional cardiovascular testing at this time  Recommend follow-up in 6 months

## 2023-08-11 NOTE — PROGRESS NOTES
Cardiology Office Note  MD Jose Alejandro Tillman MD, Amanda Gallardo DO, Brendolyn Koyanagi Mansoor, MD Marylyn Pintos, DO, Osvaldo Frederick DO, [facility] [place]  ----------------------------------------------------------------  [address]  [address], [address] [facility] 78 y.[facility] MRN: [id number]  Unit/Bed#:  Encounter: [id number]      History of Present Illness: It was a pleasure to see Ivy ChambersSarika in the office today for follow-up CV evaluation. He has a past medical history of seizures, hypertension, dyslipidemia, type 2 diabetes mellitus, DAY and depression/anxiety. He established care with me in June 2023. He previously followed with Dr. Frandy Sarmiento. Patient had tonic-clonic seizures in January 2023. He was admitted to [facility] and was treated with Keppra. ECG was performed showing lateral T wave abnormalities. YOVANY was performed during the hospitalization showing low normal left ventricular systolic function without severe valvular disease. And had some underlying vascular dementia. The patient to an rehabilitation and then eventually to rehabilitation. In the office in March 2023, he was noted to have PVCs. Zio patch was ordered. Zio patch demonstrated evidence of nonsustained ventricular tachycardia with the longest run of 8 beats and occasional PVCs. In the past, he was noted by Dr. Frandy Sarmiento to have some atrial tachycardia. No atrial tachycardia was noted on the monitor. Due to the patient's nonsustained VT as well as abnormal ECG, the patient was sent for pharmacologic nuclear stress test to assess for any evidence of underlying myocardial ischemia in June 2023. He is here today to review the results. He denies any chest pain, pressure, tightness or squeezing. Denies lightheadedness, dizziness or palpitations. Denies lower extremity swelling, orthopnea or paroxysmal nocturnal dyspnea.     Review of Systems:  Review of Systems Constitutional: Negative for decreased appetite, fever, weight gain and weight loss. HENT: Negative for congestion and sore throat. Eyes: Negative for visual disturbance. Cardiovascular: Negative for chest pain, dyspnea on exertion, leg swelling, near-syncope and palpitations. Respiratory: Negative for cough and shortness of breath. Hematologic/Lymphatic: Negative for bleeding problem. Skin: Negative for rash. Musculoskeletal: Negative for myalgias and neck pain. Gastrointestinal: Negative for abdominal pain and nausea. Neurological: Negative for light-headedness and weakness. Psychiatric/Behavioral: Negative for depression.        Past Medical History:   Diagnosis Date   • Chronic maxillary sinusitis 2021   • Hypercholesterolemia    • Intermittent claudication (HCC)    • Memory loss     last assessed 14   • Mild cognitive impairment     last assessed 16   • Vitamin D deficiency        Past Surgical History:   Procedure Laterality Date   • DEEP BRAIN STIMULATOR PLACEMENT Bilateral 01/10/2022   • DEEP BRAIN STIMULATOR PLACEMENT  2023   • HEAD AND NECK DEBRIDEMENT  06/15/2022    s/p Left neck wound debridement & washout with exposed wire   • TONSILLECTOMY AND ADENOIDECTOMY     • VAGAL NERVE STIMULATOR REMOVAL  10/03/2022    deep brain stimulator removal       Social History     Socioeconomic History   • Marital status: /Civil Union     Spouse name: None   • Number of children: None   • Years of education: None   • Highest education level: None   Occupational History   • None   Tobacco Use   • Smoking status: Every Day     Types: Cigars     Last attempt to quit: 2007     Years since quittin.1   • Smokeless tobacco: Former   Vaping Use   • Vaping Use: Never used   Substance and Sexual Activity   • Alcohol use: Not Currently     Comment: social; rare   • Drug use: Never   • Sexual activity: None   Other Topics Concern   • None   Social History Narrative   • None     Social Determinants of Health     Financial Resource Strain: Not on file   Food Insecurity: Not on file   Transportation Needs: Not on file   Physical Activity: Not on file   Stress: Not on file   Social Connections: Not on file   Intimate Partner Violence: Not on file   Housing Stability: Not on file       Family History   Problem Relation Age of Onset   • Melanoma Father         skin       Allergies   Allergen Reactions   • Cefadroxil Headache   • Pioglitazone    • Sulfa Antibiotics    • Victoza [Liraglutide] Rash         Current Outpatient Medications:   •  betamethasone dipropionate (DIPROSONE) 0.05 % cream, Apply topically 2 (two) times a day, Disp: , Rfl:   •  bisacodyl (DULCOLAX) 10 mg suppository, Insert 10 mg into the rectum, Disp: , Rfl:   •  Calcipotriene-Betameth Diprop 0.005-0.064 % FOAM, Apply up to BID, Disp: 180 g, Rfl: 3  •  Cholecalciferol 25 MCG (1000 UT) tablet, Take 1 tablet (1,000 Units total) by mouth daily Vitamin d3, Disp: 30 tablet, Rfl: 0  •  Docusate Sodium (DSS) 100 MG CAPS, Take 100 mg by mouth 2 (two) times a day, Disp: , Rfl:   •  levETIRAcetam (KEPPRA) 500 mg tablet, Take 1 tablet (500 mg total) by mouth 2 (two) times a day, Disp: 180 tablet, Rfl: 3  •  metoprolol succinate (TOPROL-XL) 25 mg 24 hr tablet, Take 1 tablet (25 mg total) by mouth daily at bedtime, Disp: 90 tablet, Rfl: 3  •  Multiple Vitamin (multivitamin) tablet, Take 1 tablet by mouth daily, Disp: , Rfl:   •  PARoxetine (PAXIL) 30 mg tablet, Take 1 tablet (30 mg total) by mouth daily, Disp: 90 tablet, Rfl: 3  •  senna (SENOKOT) 8.6 MG tablet, Take 17.2 mg by mouth daily, Disp: , Rfl:   •  terazosin (HYTRIN) 2 mg capsule, Take 1 capsule (2 mg total) by mouth daily at bedtime, Disp: 90 capsule, Rfl: 3  •  vitamin B-12 (CYANOCOBALAMIN) 50 MCG TABS, Take 2 tablets (100 mcg total) by mouth daily, Disp: 30 tablet, Rfl: 1    Vitals:    08/11/23 1113   BP: 114/60   Pulse: 69   SpO2: 97%   Weight: 76.2 kg (168 lb) Height: 5' 7" (1.702 m)     Body mass index is 26.31 kg/m². PHYSICAL EXAMINATION:  Gen: Awake, Alert, NAD   Head/eyes: AT/NC, pupils equal and round, Anicteric  ENT: mmm  Neck: Supple, No elevated JVP, trachea midline  Resp: CTA bilaterally no w/r/r  CV: RRR +S1, S2, No m/r/g  Abd: Soft, NT/ND + BS  Ext: no LE edema bilaterally  Neuro: Follows commands, moves all extermities  Psych: Appropriate affect, happy mood, pleasant attitude, non-combative  Skin: warm; no rash, erythema or venous stasis changes on exposed skin    --------------------------------------------------------------------------------  TREADMILL STRESS  No results found for this or any previous visit.     --------------------------------------------------------------------------------  NUCLEAR STRESS TEST: No results found for this or any previous visit. No results found for this or any previous visit.      --------------------------------------------------------------------------------  CATH:  No results found for this or any previous visit.    --------------------------------------------------------------------------------  ECHO:   Results for orders placed during the hospital encounter of 17    Echo complete with contrast if indicated    Narrative  [address] [place] [address]. [address]  [phone]    Transthoracic Echocardiogram  2D, M-mode, Doppler, and Color Doppler    Study date:  2017    Patient: Bradley Sawyer  MR number: [id number]  Account number: [de-identified]  : 1945  Age: 81 years  Gender: Male  Status: Outpatient  Location: [facility]  Height: 68 in  Weight: 229 lb  BP: 124/ 68 mmHg    Indications: cardiac arrhythmia.  Palpitations    Diagnoses: I49.9 - Cardiac arrhythmia, unspecified    Sonographer:  ADELA Mak  Primary Physician:  Liz Fernandez MD  Referring Physician:  Marlene Petty MD  Group:  [facility]  Interpreting Physician:  Beau Dominique MD    SUMMARY    LEFT VENTRICLE:  Systolic function was normal. Ejection fraction was estimated to be 60 %. Although no diagnostic regional wall motion abnormality was identified, this possibility cannot be completely excluded on the basis of this study. There was mild concentric hypertrophy. Doppler parameters were consistent with abnormal left ventricular relaxation (grade 1 diastolic dysfunction). AORTA:  The root exhibited upper limit of normal size (4 cm at the Sinsuses of Valsalva - 3.6 cm above the Sinsuses)    HISTORY: PRIOR HISTORY: Risk factors: hypertension, diabetes, hypercholesterolemia, and a former history of cigarette use (quitting more than one month ago). PROCEDURE: The study was performed in the [address] [place] [address]. This was a routine study. The transthoracic approach was used. The study included complete 2D imaging, M-mode, complete spectral Doppler, and color Doppler. The  heart rate was 87 bpm, at the start of the study. LEFT VENTRICLE: Size was normal. Systolic function was normal. Ejection fraction was estimated to be 60 %. Although no diagnostic regional wall motion abnormality was identified, this possibility cannot be completely excluded on the basis  of this study. There was mild concentric hypertrophy. DOPPLER: Doppler parameters were consistent with abnormal left ventricular relaxation (grade 1 diastolic dysfunction). RIGHT VENTRICLE: The size was normal. Systolic function was normal.    LEFT ATRIUM: Size was normal.    RIGHT ATRIUM: Size was normal.    MITRAL VALVE: Valve structure was normal. DOPPLER: There was no evidence for stenosis. There was no regurgitation. AORTIC VALVE: The valve was trileaflet. Leaflets exhibited mild calcification. DOPPLER: There was no evidence for stenosis. There was no regurgitation. TRICUSPID VALVE: The valve structure was normal. DOPPLER: There was no regurgitation. The tricuspid jet envelope definition was inadequate for estimation of RV systolic pressure. There are no indirect findings (abnormal RV volume or  geometry, altered pulmonary flow velocity profile, or leftward septal displacement) which would suggest moderate or severe pulmonary hypertension. PULMONIC VALVE: Not well visualized. PERICARDIUM: There was no pericardial effusion. AORTA: The root exhibited upper limit of normal size (4 cm at the Sinsuses of Valsalva - 3.6 cm above the Sinsuses)    SYSTEMIC VEINS: IVC: The inferior vena cava was normal in size. Respirophasic changes were normal.    SYSTEM MEASUREMENT TABLES    2D  %FS: 29.1 %  Ao Diam: 4 cm  EDV(Teich): 125.6 ml  EF(Teich): 55.5 %  ESV(Teich): 55.9 ml  IVSd: 1.1 cm  LA Area: 17.5 cm2  LA Diam: 3.5 cm  LVEDV MOD A4C: 109.1 ml  LVEF MOD A4C: 50.7 %  LVESV MOD A4C: 53.8 ml  LVIDd: 5.1 cm  LVIDs: 3.6 cm  LVLd A4C: 8.4 cm  LVLs A4C: 7.4 cm  LVPWd: 1 cm  RA Area: 14.7 cm2  RVIDd: 3.4 cm  SV MOD A4C: 55.3 ml  SV(Teich): 69.7 ml    MM  TAPSE: 2.5 cm    PW  AVC: 354.1 ms  E': 0.1 m/s  E/E': 9.7  MV A Hermann: 0.8 m/s  MV Dec Crane: 4.3 m/s2  MV DecT: 147.8 ms  MV E Hermann: 0.6 m/s  MV E/A Ratio: 0.8  MV PHT: 42.9 ms  MVA By PHT: 5.1 cm2    [facility]    Prepared and electronically signed by    Jono Angulo MD  Signed [id number] 12:18:57    No results found for this or any previous visit.    --------------------------------------------------------------------------------  HOLTER  No results found for this or any previous visit. Results for orders placed during the hospital encounter of 17    Holter monitor - 48 hour    Narrative  PT NAME: Alex Wilson  : 1945  AGE: 81 y.o.   GENDER: male  MRN: [id number]   PROCEDURE: Holter monitor - 48 hour        INDICATIONS: Palpitations      FINDINGS:    Holter monitoring revealed predominant sinus rhythm with an average heart rate of  97 BPM, a minimum heart rate of  62 BPM, and a maximum heart rate of  145 BPM.    There were about 1186 ventricular ectopic beats, mostly occurring as single PVC's with no evidence of ventricular tachycardia. There was 4 beats of ventricular bigeminy. There was a 4 beat run of nonsustained ventricular tachycardia. There were about 25 supraventricular ectopic beats, mostly occurring as single PAC's and late beats with no evidence of atrial fibrillation or atrial flutter. For approximately 7-8 hours in the afternoon to evening on the 2nd day, the heart rate was increased in the 130s to 140s range. It appears that the heart rate abruptly increased and then abruptly decreased which suggests a supraventricular tachycardia. The morphology is most consistent with sinus tachycardia or atrial tachycardia. There was no evidence of significant bradyarrhythmia or advanced heart block. The longest R-R interval was  1.4 seconds. The patient's symptom diary reported no symptoms. --------------------------------------------------------------------------------  CAROTIDS  No results found for this or any previous visit.     --------------------------------------------------------------------------------  ECGs:  No results found for this visit on 08/11/23.      Lab Results   Component Value Date    WBC 8.86 02/22/2022    HGB 14.4 02/22/2022    HCT 45.0 02/22/2022    MCV 97 02/22/2022     02/22/2022      Lab Results   Component Value Date    SODIUM 139 02/22/2022    K 4.0 02/22/2022     02/22/2022    CO2 31 02/22/2022    BUN 18 02/22/2022    CREATININE 0.44 01/24/2023    GLUC 305 (H) 01/06/2017    CALCIUM 9.7 02/22/2022      Lab Results   Component Value Date    HGBA1C 6.4 04/05/2023      Lab Results   Component Value Date    CHOL 153 04/24/2014    CHOL 121 09/16/2013     Lab Results   Component Value Date    HDL 62 08/03/2021    HDL 35 (L) 03/11/2019    HDL 47 01/06/2017     Lab Results   Component Value Date LDLCALC 73 08/03/2021    LDLCALC 75 03/11/2019    LDLCALC 88 01/06/2017     Lab Results   Component Value Date    TRIG 91 08/03/2021    TRIG 86 03/11/2019    TRIG 83 01/06/2017     No results found for: "CHOLHDL"   No results found for: "INR", "PROTIME"     1. NSVT (nonsustained ventricular tachycardia) (HCC)    2. Paroxysmal atrial tachycardia ([address])    3. Essential hypertension    4. Type 2 diabetes mellitus with retinopathy of both eyes, macular edema presence unspecified, unspecified retinopathy severity, unspecified whether long term insulin use ([address])    5. PVC (premature ventricular contraction)    6. Dyslipidemia        IMPRESSION:  • NSVT/PVCs  o Event recorder w/ SR avg HR 90 bpm, rare PACs, rare atrial couplets/triplets, occasional PVCs with 1.1%, rare ventricular couplets/triplets, nonsustained VT x3 with longest run 8 beats at 193 bpm, nonsustained PSVT with longest run 11 beats at 137 bpm, no triggered events noted, April 2023  • History of paroxysmal atrial tachycardia  • Abnormal ECG w/ iLBBB  o Pharmacologic nuclear stress test appears negative for myocardial ischemia, gated EF 54%, June 2023  • Hypertension  o LVEF 50-55%, mild LVH, paradoxical septal wall motion, mild RV dilatation with normal function, severe LA dilatation, AV sclerosis, mild MAC, January 2023  • Dyslipidemia  • Type 2 diabetes mellitus  • Tonic-clonic seizures on Keppra  • Essential tremors s/p deep brain stimulator, January 2023  • Depression/anxiety    PLAN:  It was a pleasure to see [facility] in the office today for follow-up CV evaluation. [facility] is here today to review the results of stress testing due to his nonsustained VT, PVCs, hypertension and abnormal ECG. Stress test was found to be negative for myocardial ischemia with evidence of some diaphragmatic attenuation. I have shared with him this stable news. He has no chest pain concerning for angina and no signs or symptoms of heart failure.   He examines to be euvolemic in the office today. Blood pressure and heart rate are currently stable. He is tolerating his current medications without any reported adverse effects. Denies significant exertional symptoms with physical activity. Based on his clinical presentation, I have the following recommendations:    1. Recommend heart healthy diet low in sodium and carbohydrate. 2.  Would encourage exercise regimen to build cardiovascular endurance as tolerated. 3.  Continue current antihypertensive regimen including metoprolol  4. No additional CV testing at this time. 5.  No changes to his medication regimen from CV perspective. 6.  We will follow-up with him in 6 months to reassess his progress. As always, please not hesitate to call with any questions. Portions of the record may have been created with voice recognition software. Occasional wrong word or "sound a like" substitutions may have occurred due to the inherent limitations of voice recognition software. Read the chart carefully and recognize, using context, where substitutions have occurred.       Signed: Cally Barboza DO, [id number] [facility]

## 2023-08-14 ENCOUNTER — APPOINTMENT (OUTPATIENT)
Dept: LAB | Facility: MEDICAL CENTER | Age: 78
End: 2023-08-14
Payer: MEDICARE

## 2023-08-14 DIAGNOSIS — R79.89 TSH ELEVATION: ICD-10-CM

## 2023-08-14 DIAGNOSIS — E55.9 VITAMIN D DEFICIENCY: ICD-10-CM

## 2023-08-14 DIAGNOSIS — E11.319 TYPE 2 DIABETES MELLITUS WITH RETINOPATHY OF BOTH EYES, WITHOUT LONG-TERM CURRENT USE OF INSULIN, MACULAR EDEMA PRESENCE UNSPECIFIED, UNSPECIFIED RETINOPATHY SEVERITY (HCC): ICD-10-CM

## 2023-08-14 DIAGNOSIS — R73.9 HYPERGLYCEMIA: ICD-10-CM

## 2023-08-14 DIAGNOSIS — E78.5 DYSLIPIDEMIA: ICD-10-CM

## 2023-08-14 DIAGNOSIS — F33.1 MODERATE EPISODE OF RECURRENT MAJOR DEPRESSIVE DISORDER (HCC): ICD-10-CM

## 2023-08-14 DIAGNOSIS — D64.9 ANEMIA, UNSPECIFIED TYPE: ICD-10-CM

## 2023-08-14 DIAGNOSIS — E53.8 VITAMIN B12 DEFICIENCY: ICD-10-CM

## 2023-08-14 LAB
25(OH)D3 SERPL-MCNC: 40.5 NG/ML (ref 30–100)
ALBUMIN SERPL BCP-MCNC: 3.7 G/DL (ref 3.5–5)
ALP SERPL-CCNC: 76 U/L (ref 46–116)
ALT SERPL W P-5'-P-CCNC: 17 U/L (ref 12–78)
ANION GAP SERPL CALCULATED.3IONS-SCNC: 8 MMOL/L
AST SERPL W P-5'-P-CCNC: 20 U/L (ref 5–45)
BASOPHILS # BLD AUTO: 0.06 THOUSANDS/ÂΜL (ref 0–0.1)
BASOPHILS NFR BLD AUTO: 1 % (ref 0–1)
BILIRUB SERPL-MCNC: 0.47 MG/DL (ref 0.2–1)
BUN SERPL-MCNC: 19 MG/DL (ref 5–25)
CALCIUM SERPL-MCNC: 9 MG/DL (ref 8.3–10.1)
CHLORIDE SERPL-SCNC: 110 MMOL/L (ref 96–108)
CO2 SERPL-SCNC: 26 MMOL/L (ref 21–32)
CREAT SERPL-MCNC: 0.86 MG/DL (ref 0.6–1.3)
EOSINOPHIL # BLD AUTO: 0.52 THOUSAND/ÂΜL (ref 0–0.61)
EOSINOPHIL NFR BLD AUTO: 7 % (ref 0–6)
ERYTHROCYTE [DISTWIDTH] IN BLOOD BY AUTOMATED COUNT: 16.8 % (ref 11.6–15.1)
EST. AVERAGE GLUCOSE BLD GHB EST-MCNC: 128 MG/DL
FERRITIN SERPL-MCNC: 9 NG/ML (ref 24–336)
GFR SERPL CREATININE-BSD FRML MDRD: 84 ML/MIN/1.73SQ M
GLUCOSE P FAST SERPL-MCNC: 172 MG/DL (ref 65–99)
HBA1C MFR BLD: 6.1 %
HCT VFR BLD AUTO: 41.3 % (ref 36.5–49.3)
HGB BLD-MCNC: 13.2 G/DL (ref 12–17)
IMM GRANULOCYTES # BLD AUTO: 0.03 THOUSAND/UL (ref 0–0.2)
IMM GRANULOCYTES NFR BLD AUTO: 0 % (ref 0–2)
IRON SATN MFR SERPL: 22 % (ref 20–50)
IRON SERPL-MCNC: 82 UG/DL (ref 65–175)
LYMPHOCYTES # BLD AUTO: 1.51 THOUSANDS/ÂΜL (ref 0.6–4.47)
LYMPHOCYTES NFR BLD AUTO: 21 % (ref 14–44)
MCH RBC QN AUTO: 29.3 PG (ref 26.8–34.3)
MCHC RBC AUTO-ENTMCNC: 32 G/DL (ref 31.4–37.4)
MCV RBC AUTO: 92 FL (ref 82–98)
MONOCYTES # BLD AUTO: 0.58 THOUSAND/ÂΜL (ref 0.17–1.22)
MONOCYTES NFR BLD AUTO: 8 % (ref 4–12)
NEUTROPHILS # BLD AUTO: 4.63 THOUSANDS/ÂΜL (ref 1.85–7.62)
NEUTS SEG NFR BLD AUTO: 63 % (ref 43–75)
NRBC BLD AUTO-RTO: 0 /100 WBCS
PLATELET # BLD AUTO: 238 THOUSANDS/UL (ref 149–390)
PMV BLD AUTO: 10.2 FL (ref 8.9–12.7)
POTASSIUM SERPL-SCNC: 4.1 MMOL/L (ref 3.5–5.3)
PROT SERPL-MCNC: 7.7 G/DL (ref 6.4–8.4)
RBC # BLD AUTO: 4.51 MILLION/UL (ref 3.88–5.62)
SODIUM SERPL-SCNC: 144 MMOL/L (ref 135–147)
TIBC SERPL-MCNC: 365 UG/DL (ref 250–450)
TSH SERPL DL<=0.05 MIU/L-ACNC: 0.48 UIU/ML (ref 0.45–4.5)
VIT B12 SERPL-MCNC: 358 PG/ML (ref 180–914)
WBC # BLD AUTO: 7.33 THOUSAND/UL (ref 4.31–10.16)

## 2023-08-14 PROCEDURE — 82306 VITAMIN D 25 HYDROXY: CPT

## 2023-08-14 PROCEDURE — 85025 COMPLETE CBC W/AUTO DIFF WBC: CPT

## 2023-08-14 PROCEDURE — 82728 ASSAY OF FERRITIN: CPT

## 2023-08-14 PROCEDURE — 36415 COLL VENOUS BLD VENIPUNCTURE: CPT

## 2023-08-14 PROCEDURE — 83036 HEMOGLOBIN GLYCOSYLATED A1C: CPT

## 2023-08-14 PROCEDURE — 83550 IRON BINDING TEST: CPT

## 2023-08-14 PROCEDURE — 84443 ASSAY THYROID STIM HORMONE: CPT

## 2023-08-14 PROCEDURE — 82607 VITAMIN B-12: CPT

## 2023-08-14 PROCEDURE — 83540 ASSAY OF IRON: CPT

## 2023-08-14 PROCEDURE — 80053 COMPREHEN METABOLIC PANEL: CPT

## 2023-08-16 ENCOUNTER — OFFICE VISIT (OUTPATIENT)
Dept: FAMILY MEDICINE CLINIC | Facility: CLINIC | Age: 78
End: 2023-08-16
Payer: MEDICARE

## 2023-08-16 VITALS
HEIGHT: 67 IN | OXYGEN SATURATION: 99 % | SYSTOLIC BLOOD PRESSURE: 130 MMHG | RESPIRATION RATE: 14 BRPM | DIASTOLIC BLOOD PRESSURE: 60 MMHG | TEMPERATURE: 97.5 F | HEART RATE: 60 BPM | BODY MASS INDEX: 26.34 KG/M2 | WEIGHT: 167.8 LBS

## 2023-08-16 DIAGNOSIS — Z12.11 COLON CANCER SCREENING: Primary | ICD-10-CM

## 2023-08-16 DIAGNOSIS — G25.0 ESSENTIAL TREMOR: ICD-10-CM

## 2023-08-16 DIAGNOSIS — E11.319 TYPE 2 DIABETES MELLITUS WITH RETINOPATHY OF BOTH EYES, MACULAR EDEMA PRESENCE UNSPECIFIED, UNSPECIFIED RETINOPATHY SEVERITY, UNSPECIFIED WHETHER LONG TERM INSULIN USE (HCC): ICD-10-CM

## 2023-08-16 DIAGNOSIS — F33.1 MODERATE EPISODE OF RECURRENT MAJOR DEPRESSIVE DISORDER (HCC): ICD-10-CM

## 2023-08-16 DIAGNOSIS — Z72.0 TOBACCO USER: ICD-10-CM

## 2023-08-16 DIAGNOSIS — G40.409 TONIC CLONIC CONVULSION (HCC): ICD-10-CM

## 2023-08-16 DIAGNOSIS — E11.319 DIABETIC RETINOPATHY OF BOTH EYES ASSOCIATED WITH TYPE 2 DIABETES MELLITUS, MACULAR EDEMA PRESENCE UNSPECIFIED, UNSPECIFIED RETINOPATHY SEVERITY (HCC): ICD-10-CM

## 2023-08-16 DIAGNOSIS — E78.5 DYSLIPIDEMIA: ICD-10-CM

## 2023-08-16 DIAGNOSIS — E53.8 VITAMIN B12 DEFICIENCY: ICD-10-CM

## 2023-08-16 DIAGNOSIS — Z96.82 PRESENCE OF NEUROSTIMULATOR: ICD-10-CM

## 2023-08-16 DIAGNOSIS — E55.9 VITAMIN D DEFICIENCY: ICD-10-CM

## 2023-08-16 PROBLEM — S42.022A CLOSED FRACTURE OF SHAFT OF LEFT CLAVICLE: Status: RESOLVED | Noted: 2023-02-07 | Resolved: 2023-08-16

## 2023-08-16 PROBLEM — S22.42XA FRACTURE OF MULTIPLE RIBS OF LEFT SIDE: Status: RESOLVED | Noted: 2023-01-21 | Resolved: 2023-08-16

## 2023-08-16 PROCEDURE — G0439 PPPS, SUBSEQ VISIT: HCPCS | Performed by: FAMILY MEDICINE

## 2023-08-16 PROCEDURE — 99214 OFFICE O/P EST MOD 30 MIN: CPT | Performed by: FAMILY MEDICINE

## 2023-08-16 NOTE — ASSESSMENT & PLAN NOTE
Lab Results   Component Value Date    HGBA1C 6.1 (H) 08/14/2023   Continue close follow-up with ophthalmology

## 2023-08-16 NOTE — ASSESSMENT & PLAN NOTE
Lab Results   Component Value Date    HGBA1C 6.1 (H) 08/14/2023   Check routine labs prior to follow-up. Is off medication at this time.

## 2023-08-16 NOTE — PROGRESS NOTES
Assessment and Plan:     Problem List Items Addressed This Visit        Endocrine    Diabetic retinopathy ([address])       Lab Results   Component Value Date    HGBA1C 6.1 (H) 08/14/2023   Continue close follow-up with ophthalmology         Relevant Orders    Hemoglobin A1C    Comprehensive metabolic panel    Type 2 diabetes mellitus ([address])       Lab Results   Component Value Date    HGBA1C 6.1 (H) 08/14/2023   Check routine labs prior to follow-up. Is off medication at this time. Relevant Orders    Albumin / creatinine urine ratio    Hemoglobin A1C    Comprehensive metabolic panel    Lipid Panel with Direct LDL reflex       Nervous and Auditory    Essential tremor     He is status post deep brain stimulator. Continue close follow-up with neurology         Relevant Orders    CBC and differential    TSH, 3rd generation with Free T4 reflex       Other    Vitamin B12 deficiency    Relevant Orders    Comprehensive metabolic panel    CBC and differential    Vitamin B12    Vitamin D deficiency    Relevant Orders    Vitamin D 25 hydroxy    Moderate episode of recurrent major depressive disorder ([address])     He remained stable with paroxetine. Dyslipidemia    Relevant Orders    Comprehensive metabolic panel    Lipid Panel with Direct LDL reflex    Presence of neurostimulator    Tonic clonic convulsion (HCC)     Continue follow-up with Dr. Pamela Pelletier locally. Tobacco user   Other Visit Diagnoses     Colon cancer screening    -  Primary           Preventive health issues were discussed with patient, and age appropriate screening tests were ordered as noted in patient's After Visit Summary. Personalized health advice and appropriate referrals for health education or preventive services given if needed, as noted in patient's After Visit Summary.      History of Present Illness:     Patient presents for a Medicare Wellness Visit    Patient presents today for follow-up of chronic health issues as well as Medicare wellness visit. He does continue to have a very significant essential tremor. He is followed locally by neurology. He is status post neurostimulator which has been very helpful for his left hand. He is living with his son because of the significant tremor. He has lost a lot of weight but has stabilized the weight loss at this time. He notes he has been exercising quite a bit. He is off metformin and A1c looks excellent. Patient Care Team:  Rosa Raza MD as PCP - General     Review of Systems:     Review of Systems   Constitutional: Negative for appetite change, chills, fatigue, fever and unexpected weight change. HENT: Negative for trouble swallowing. Eyes: Negative for visual disturbance. Respiratory: Negative for cough, chest tightness, shortness of breath and wheezing. Cardiovascular: Negative for chest pain, palpitations and leg swelling. Gastrointestinal: Negative for abdominal distention, abdominal pain, blood in stool, constipation and diarrhea. Endocrine: Negative for polyuria. Genitourinary: Negative for difficulty urinating and flank pain. Musculoskeletal: Negative for arthralgias and myalgias. Skin: Negative for rash. Neurological: Positive for tremors. Negative for dizziness and light-headedness. Hematological: Negative for adenopathy. Does not bruise/bleed easily. Psychiatric/Behavioral: Negative for dysphoric mood and sleep disturbance. The patient is not nervous/anxious.          Problem List:     Patient Active Problem List   Diagnosis   • Essential tremor   • Diabetic retinopathy ([address])   • Eczema   • Essential hypertension   • Impotence of organic origin   • Psoriasis   • DAY (obstructive sleep apnea)   • Vitamin B12 deficiency   • Vitamin D deficiency   • Type 2 diabetes mellitus (HCC)   • Moderate episode of recurrent major depressive disorder (HCC)   • Dyslipidemia   • Sinus arrhythmia   • Rhinorrhea   • Abnormal weight loss   • Presence of neurostimulator   • Tonic clonic convulsion (HCC)   • Dysarthria   • Other dysphagia   • Anxiety   • Hyperglycemia   • BPH (benign prostatic hyperplasia)   • Other constipation   • Seizures ([address])   • Tobacco user      Past Medical and Surgical History:     Past Medical History:   Diagnosis Date   • Chronic maxillary sinusitis 2021   • Closed fracture of shaft of left clavicle 2023   • Hypercholesterolemia    • Intermittent claudication (HCC)    • Memory loss     last assessed 14   • Mild cognitive impairment     last assessed 16   • Vitamin D deficiency      Past Surgical History:   Procedure Laterality Date   • DEEP BRAIN STIMULATOR PLACEMENT Bilateral 01/10/2022   • DEEP BRAIN STIMULATOR PLACEMENT  2023   • HEAD AND NECK DEBRIDEMENT  06/15/2022    s/p Left neck wound debridement & washout with exposed wire   • TONSILLECTOMY AND ADENOIDECTOMY     • VAGAL NERVE STIMULATOR REMOVAL  10/03/2022    deep brain stimulator removal      Family History:     Family History   Problem Relation Age of Onset   • Melanoma Father         skin      Social History:     Social History     Socioeconomic History   • Marital status: /Civil Union     Spouse name: None   • Number of children: None   • Years of education: None   • Highest education level: None   Occupational History   • None   Tobacco Use   • Smoking status: Every Day     Types: Cigars     Last attempt to quit: 2007     Years since quittin.1   • Smokeless tobacco: Former   Vaping Use   • Vaping Use: Never used   Substance and Sexual Activity   • Alcohol use: Not Currently     Comment: social; rare   • Drug use: Never   • Sexual activity: None   Other Topics Concern   • None   Social History Narrative   • None     Social Determinants of Health     Financial Resource Strain: Low Risk  (2023)    Overall Financial Resource Strain (CARDIA)    • Difficulty of Paying Living Expenses: Not hard at all   Food Insecurity: Not on file Transportation Needs: No Transportation Needs (8/16/2023)    PRAPARE - Transportation    • Lack of Transportation (Medical): No    • Lack of Transportation (Non-Medical): No   Physical Activity: Not on file   Stress: Not on file   Social Connections: Not on file   Intimate Partner Violence: Not on file   Housing Stability: Not on file      Medications and Allergies:     Current Outpatient Medications   Medication Sig Dispense Refill   • betamethasone dipropionate (DIPROSONE) 0.05 % cream Apply topically 2 (two) times a day     • bisacodyl (DULCOLAX) 10 mg suppository Insert 10 mg into the rectum     • Calcipotriene-Betameth Diprop 0.005-0.064 % FOAM Apply up to  g 3   • Cholecalciferol 25 MCG (1000 UT) tablet Take 1 tablet (1,000 Units total) by mouth daily Vitamin d3 30 tablet 0   • Docusate Sodium (DSS) 100 MG CAPS Take 100 mg by mouth 2 (two) times a day     • levETIRAcetam (KEPPRA) 500 mg tablet Take 1 tablet (500 mg total) by mouth 2 (two) times a day 180 tablet 3   • metoprolol succinate (TOPROL-XL) 25 mg 24 hr tablet Take 1 tablet (25 mg total) by mouth daily at bedtime 90 tablet 3   • Multiple Vitamin (multivitamin) tablet Take 1 tablet by mouth daily     • PARoxetine (PAXIL) 30 mg tablet Take 1 tablet (30 mg total) by mouth daily 90 tablet 3   • senna (SENOKOT) 8.6 MG tablet Take 17.2 mg by mouth daily     • terazosin (HYTRIN) 2 mg capsule Take 1 capsule (2 mg total) by mouth daily at bedtime 90 capsule 3   • vitamin B-12 (CYANOCOBALAMIN) 50 MCG TABS Take 2 tablets (100 mcg total) by mouth daily 30 tablet 1     No current facility-administered medications for this visit.      Allergies   Allergen Reactions   • Cefadroxil Headache   • Pioglitazone    • Sulfa Antibiotics    • Victoza [Liraglutide] Rash      Immunizations:     Immunization History   Administered Date(s) Administered   • COVID-19 MODERNA VACC 0.5 ML IM 04/01/2021, 05/05/2021      Health Maintenance:         Topic Date Due   • Colorectal Cancer Screening  08/16/2024 (Originally 11/26/1990)   • Hepatitis C Screening  Completed         Topic Date Due   • Influenza Vaccine (1) 09/01/2023      Medicare Screening Tests and Risk Assessments:     Jeremy Granado is here for his Subsequent Wellness visit. Health Risk Assessment:   Patient rates overall health as good. Patient feels that their physical health rating is same. Patient is satisfied with their life. Eyesight was rated as same. Hearing was rated as same. Patient feels that their emotional and mental health rating is same. Patients states they are never, rarely angry. Patient states they are never, rarely unusually tired/fatigued. Pain experienced in the last 7 days has been none. Patient states that he has experienced no weight loss or gain in last 6 months. Depression Screening:   PHQ-9 Score: 0      Fall Risk Screening: In the past year, patient has experienced: no history of falling in past year      Home Safety:  Patient does not have trouble with stairs inside or outside of their home. Patient has working smoke alarms and has no working carbon monoxide detector. Home safety hazards include: none. Nutrition:   Current diet is Regular. Medications:   Patient is currently taking over-the-counter supplements. OTC medications include: see medication list. Patient is able to manage medications. Activities of Daily Living (ADLs)/Instrumental Activities of Daily Living (IADLs):   Walk and transfer into and out of bed and chair?: Yes  Dress and groom yourself?: Yes    Bathe or shower yourself?: Yes    Feed yourself?  Yes  Do your laundry/housekeeping?: Yes  Manage your money, pay your bills and track your expenses?: Yes  Make your own meals?: Yes    Do your own shopping?: Yes    Previous Hospitalizations:   Any hospitalizations or ED visits within the last 12 months?: Yes    How many hospitalizations have you had in the last year?: 3-4    Advance Care Planning:   Living will: Yes    Durable POA for healthcare: Yes    Advanced directive: Yes    End of Life Decisions reviewed with patient: Yes      Cognitive Screening:   Provider or family/friend/caregiver concerned regarding cognition?: No    PREVENTIVE SCREENINGS      Cardiovascular Screening:    General: Screening Current      Diabetes Screening:     General: Screening Not Indicated and History Diabetes      Colorectal Cancer Screening:     General: Patient Declines      Prostate Cancer Screening:    General: Screening Not Indicated      Osteoporosis Screening:    General: Screening Not Indicated      Abdominal Aortic Aneurysm (AAA) Screening:    Risk factors include: age between 70-75 yo and tobacco use        Lung Cancer Screening:     General: Screening Not Indicated      Hepatitis C Screening:    General: Screening Current    Screening, Brief Intervention, and Referral to Treatment (SBIRT)    Screening  Typical number of drinks in a day: 0  Typical number of drinks in a week: 0  Interpretation: Low risk drinking behavior. Single Item Drug Screening:  How often have you used an illegal drug (including marijuana) or a prescription medication for non-medical reasons in the past year? never    Single Item Drug Screen Score: 0  Interpretation: Negative screen for possible drug use disorder    No results found.      Physical Exam:     /60 (BP Location: Left arm, Patient Position: Sitting, Cuff Size: Standard)   Pulse 60   Temp 97.5 °F (36.4 °C) (Temporal)   Resp 14   Ht 5' 7" (1.702 m)   Wt 76.1 kg (167 lb 12.8 oz)   SpO2 99%   BMI 26.28 kg/m²     Physical Exam     Rian Paez MD

## 2023-08-16 NOTE — PATIENT INSTRUCTIONS
Medicare Preventive Visit Patient Instructions  Thank you for completing your Welcome to Medicare Visit or Medicare Annual Wellness Visit today. Your next wellness visit will be due in one year (8/16/2024). The screening/preventive services that you may require over the next 5-10 years are detailed below. Some tests may not apply to you based off risk factors and/or age. Screening tests ordered at today's visit but not completed yet may show as past due. Also, please note that scanned in results may not display below. Preventive Screenings:  Service Recommendations Previous Testing/Comments   Colorectal Cancer Screening  · Colonoscopy    · Fecal Occult Blood Test (FOBT)/Fecal Immunochemical Test (FIT)  · Fecal DNA/Cologuard Test  · Flexible Sigmoidoscopy Age: 43-73 years old   Colonoscopy: every 10 years (May be performed more frequently if at higher risk)  OR  FOBT/FIT: every 1 year  OR  Cologuard: every 3 years  OR  Sigmoidoscopy: every 5 years  Screening may be recommended earlier than age 39 if at higher risk for colorectal cancer. Also, an individualized decision between you and your healthcare provider will decide whether screening between the ages of 77-80 would be appropriate.  Colonoscopy: Not on file  FOBT/FIT: Not on file  Cologuard: Not on file  Sigmoidoscopy: Not on file          Prostate Cancer Screening Individualized decision between patient and health care provider in men between ages of 53-66   Medicare will cover every 12 months beginning on the day after your 50th birthday PSA: 0.4 ng/mL     Screening Not Indicated     Hepatitis C Screening Once for adults born between 1945 and 1965  More frequently in patients at high risk for Hepatitis C Hep C Antibody: 06/23/2020    Screening Current   Diabetes Screening 1-2 times per year if you're at risk for diabetes or have pre-diabetes Fasting glucose: 172 mg/dL (8/14/2023)  A1C: 6.1 % (8/14/2023)  Screening Not Indicated  History Diabetes Cholesterol Screening Once every 5 years if you don't have a lipid disorder. May order more often based on risk factors. Lipid panel: 08/03/2021  Screening Current      Other Preventive Screenings Covered by Medicare:  1. Abdominal Aortic Aneurysm (AAA) Screening: covered once if your at risk. You're considered to be at risk if you have a family history of AAA or a male between the age of 70-76 who smoking at least 100 cigarettes in your lifetime. 2. Lung Cancer Screening: covers low dose CT scan once per year if you meet all of the following conditions: (1) Age 48-67; (2) No signs or symptoms of lung cancer; (3) Current smoker or have quit smoking within the last 15 years; (4) You have a tobacco smoking history of at least 20 pack years (packs per day x number of years you smoked); (5) You get a written order from a healthcare provider. 3. Glaucoma Screening: covered annually if you're considered high risk: (1) You have diabetes OR (2) Family history of glaucoma OR (3)  aged 48 and older OR (3)  American aged 72 and older  3. Osteoporosis Screening: covered every 2 years if you meet one of the following conditions: (1) Have a vertebral abnormality; (2) On glucocorticoid therapy for more than 3 months; (3) Have primary hyperparathyroidism; (4) On osteoporosis medications and need to assess response to drug therapy. 5. HIV Screening: covered annually if you're between the age of 14-79. Also covered annually if you are younger than 13 and older than 72 with risk factors for HIV infection. For pregnant patients, it is covered up to 3 times per pregnancy.     Immunizations:  Immunization Recommendations   Influenza Vaccine Annual influenza vaccination during flu season is recommended for all persons aged >= 6 months who do not have contraindications   Pneumococcal Vaccine   * Pneumococcal conjugate vaccine = PCV13 (Prevnar 13), PCV15 (Vaxneuvance), PCV20 (Prevnar 20)  * Pneumococcal polysaccharide vaccine = PPSV23 (Pneumovax) Adults 2364 years old: 1-3 doses may be recommended based on certain risk factors  Adults 72 years old: 1-2 doses may be recommended based off what pneumonia vaccine you previously received   Hepatitis B Vaccine 3 dose series if at intermediate or high risk (ex: diabetes, end stage renal disease, liver disease)   Tetanus (Td) Vaccine - COST NOT COVERED BY MEDICARE PART B Following completion of primary series, a booster dose should be given every 10 years to maintain immunity against tetanus. Td may also be given as tetanus wound prophylaxis. Tdap Vaccine - COST NOT COVERED BY MEDICARE PART B Recommended at least once for all adults. For pregnant patients, recommended with each pregnancy. Shingles Vaccine (Shingrix) - COST NOT COVERED BY MEDICARE PART B  2 shot series recommended in those aged 48 and above     Health Maintenance Due:      Topic Date Due   • Colorectal Cancer Screening  08/16/2024 (Originally 11/26/1990)   • Hepatitis C Screening  Completed     Immunizations Due:      Topic Date Due   • Influenza Vaccine (1) 09/01/2023     Advance Directives   What are advance directives? Advance directives are legal documents that state your wishes and plans for medical care. These plans are made ahead of time in case you lose your ability to make decisions for yourself. Advance directives can apply to any medical decision, such as the treatments you want, and if you want to donate organs. What are the types of advance directives? There are many types of advance directives, and each state has rules about how to use them. You may choose a combination of any of the following:  · Living will: This is a written record of the treatment you want. You can also choose which treatments you do not want, which to limit, and which to stop at a certain time. This includes surgery, medicine, IV fluid, and tube feedings.    · Durable power of  for healthcare [facility]): This is a written record that states who you want to make healthcare choices for you when you are unable to make them for yourself. This person, called a proxy, is usually a family member or a friend. You may choose more than 1 proxy. · Do not resuscitate (DNR) order:  A DNR order is used in case your heart stops beating or you stop breathing. It is a request not to have certain forms of treatment, such as CPR. A DNR order may be included in other types of advance directives. · Medical directive: This covers the care that you want if you are in a coma, near death, or unable to make decisions for yourself. You can list the treatments you want for each condition. Treatment may include pain medicine, surgery, blood transfusions, dialysis, IV or tube feedings, and a ventilator (breathing machine). · Values history: This document has questions about your views, beliefs, and how you feel and think about life. This information can help others choose the care that you would choose. Why are advance directives important? An advance directive helps you control your care. Although spoken wishes may be used, it is better to have your wishes written down. Spoken wishes can be misunderstood, or not followed. Treatments may be given even if you do not want them. An advance directive may make it easier for your family to make difficult choices about your care. Cigarette Smoking and Your Health   Risks to your health if you smoke:  Nicotine and other chemicals found in tobacco damage every cell in your body. Even if you are a light smoker, you have an increased risk for cancer, heart disease, and lung disease. If you are pregnant or have diabetes, smoking increases your risk for complications. Benefits to your health if you stop smoking:   · You decrease respiratory symptoms such as coughing, wheezing, and shortness of breath.    · You reduce your risk for cancers of the lung, mouth, throat, kidney, bladder, pancreas, stomach, and cervix. If you already have cancer, you increase the benefits of chemotherapy. You also reduce your risk for cancer returning or a second cancer from developing. · You reduce your risk for heart disease, blood clots, heart attack, and stroke. · You reduce your risk for lung infections, and diseases such as pneumonia, asthma, chronic bronchitis, and emphysema. · Your circulation improves. More oxygen can be delivered to your body. If you have diabetes, you lower your risk for complications, such as kidney, artery, and eye diseases. You also lower your risk for nerve damage. Nerve damage can lead to amputations, poor vision, and blindness. · You improve your body's ability to heal and to fight infections. For more information and support to stop smoking:   · [employer]  Phone: 5- 349 - 821-4795  Web Address: www.[employer]  Weight Management   Why it is important to manage your weight:  Being overweight increases your risk of health conditions such as heart disease, high blood pressure, type 2 diabetes, and certain types of cancer. It can also increase your risk for osteoarthritis, sleep apnea, and other respiratory problems. Aim for a slow, steady weight loss. Even a small amount of weight loss can lower your risk of health problems. How to lose weight safely:  A safe and healthy way to lose weight is to eat fewer calories and get regular exercise. You can lose up about 1 pound a week by decreasing the number of calories you eat by 500 calories each day. Healthy meal plan for weight management:  A healthy meal plan includes a variety of foods, contains fewer calories, and helps you stay healthy. A healthy meal plan includes the following:  · Eat whole-grain foods more often. A healthy meal plan should contain fiber. Fiber is the part of grains, fruits, and vegetables that is not broken down by your body. Whole-grain foods are healthy and provide extra fiber in your diet.  Some examples of whole-grain foods are whole-wheat breads and pastas, oatmeal, brown rice, and bulgur. · Eat a variety of vegetables every day. Include dark, leafy greens such as spinach, kale, helena greens, and mustard greens. Eat yellow and orange vegetables such as carrots, sweet potatoes, and winter squash. · Eat a variety of fruits every day. Choose fresh or canned fruit (canned in its own juice or light syrup) instead of juice. Fruit juice has very little or no fiber. · Eat low-fat dairy foods. Drink fat-free (skim) milk or 1% milk. Eat fat-free yogurt and low-fat cottage cheese. Try low-fat cheeses such as mozzarella and other reduced-fat cheeses. · Choose meat and other protein foods that are low in fat. Choose beans or other legumes such as split peas or lentils. Choose fish, skinless poultry (chicken or turkey), or lean cuts of red meat (beef or pork). Before you cook meat or poultry, cut off any visible fat. · Use less fat and oil. Try baking foods instead of frying them. Add less fat, such as margarine, sour cream, regular salad dressing and mayonnaise to foods. Eat fewer high-fat foods. Some examples of high-fat foods include french fries, doughnuts, ice cream, and cakes. · Eat fewer sweets. Limit foods and drinks that are high in sugar. This includes candy, cookies, regular soda, and sweetened drinks. Exercise:  Exercise at least 30 minutes per day on most days of the week. Some examples of exercise include walking, biking, dancing, and swimming. You can also fit in more physical activity by taking the stairs instead of the elevator or parking farther away from stores. Ask your healthcare provider about the best exercise plan for you. © Copyright [employer] 2018 Information is for End User's use only and may not be sold, redistributed or otherwise used for commercial purposes.  All illustrations and images included in CareNotes® are the copyrighted property of [employer] or [employer]

## 2023-08-28 ENCOUNTER — TELEPHONE (OUTPATIENT)
Dept: NEUROLOGY | Facility: CLINIC | Age: 78
End: 2023-08-28

## 2023-08-28 NOTE — TELEPHONE ENCOUNTER
Received VM transcription from 11:33 AM:    Hi. This is Cipriano Jiménez , [birth year]. Wanted to speak with Dr. Reagan and reset my generator to get better results. Thank you.  ----------------------------------------------    Called pt to get additional information, no answer. LVM acknowledging message received. Advised pt that his request would be sent to provider.    LOV 6/22/2023. Next OV 11/30/2023 with Dr. Reagan.    Dr. Reagan - Please advise.

## 2023-09-12 ENCOUNTER — TELEPHONE (OUTPATIENT)
Dept: CARDIOLOGY CLINIC | Facility: CLINIC | Age: 78
End: 2023-09-12

## 2023-09-12 NOTE — TELEPHONE ENCOUNTER
Phone call to patient. Received fax from [facility] requesting 90 day supply for metoprolol Succ.   Asked patient to call office and confirm if he wants 90 day supply from [facility]

## 2023-09-13 DIAGNOSIS — G40.409 TONIC CLONIC CONVULSION (HCC): ICD-10-CM

## 2023-09-13 DIAGNOSIS — F33.1 MODERATE EPISODE OF RECURRENT MAJOR DEPRESSIVE DISORDER (HCC): ICD-10-CM

## 2023-09-13 DIAGNOSIS — I47.29 NSVT (NONSUSTAINED VENTRICULAR TACHYCARDIA) (HCC): ICD-10-CM

## 2023-09-13 DIAGNOSIS — N40.0 BENIGN PROSTATIC HYPERPLASIA, UNSPECIFIED WHETHER LOWER URINARY TRACT SYMPTOMS PRESENT: ICD-10-CM

## 2023-09-13 RX ORDER — TERAZOSIN 2 MG/1
2 CAPSULE ORAL
Qty: 90 CAPSULE | Refills: 3 | Status: SHIPPED | OUTPATIENT
Start: 2023-09-13 | End: 2023-10-13

## 2023-09-13 RX ORDER — PAROXETINE 30 MG/1
30 TABLET, FILM COATED ORAL DAILY
Qty: 90 TABLET | Refills: 3 | Status: SHIPPED | OUTPATIENT
Start: 2023-09-13

## 2023-09-13 RX ORDER — LEVETIRACETAM 500 MG/1
500 TABLET ORAL 2 TIMES DAILY
Qty: 180 TABLET | Refills: 3 | Status: SHIPPED | OUTPATIENT
Start: 2023-09-13 | End: 2023-10-13

## 2023-09-13 RX ORDER — METOPROLOL SUCCINATE 25 MG/1
25 TABLET, EXTENDED RELEASE ORAL
Qty: 90 TABLET | Refills: 3 | Status: SHIPPED | OUTPATIENT
Start: 2023-09-13

## 2023-09-13 NOTE — TELEPHONE ENCOUNTER
Fax refill request from [facility] for 90 day supply metoprolol Succ.     Phone call to patient, confirms he would like mail order 90 day supply

## 2023-09-18 ENCOUNTER — TELEPHONE (OUTPATIENT)
Dept: NEUROLOGY | Facility: CLINIC | Age: 78
End: 2023-09-18

## 2023-09-18 NOTE — TELEPHONE ENCOUNTER
Called pt and LMOM stating that I am calling in regards to scheduling a sooner DBS appt with Dr. Reagan in the [facility] office. I asked the patient to please give us a call back to get this appt scheduled as we have openings on 10/11/2023 and 10/12/2023 in the [place] office.

## 2023-09-18 NOTE — TELEPHONE ENCOUNTER
Called pt and LMOM stating that I am calling in regards to scheduling a sooner DBS appt with Dr. Reagan in the [facility] office. I asked the patient to please give us a call back to get this appt scheduled as we have openings on 10/11/2023 and 10/12/2023 in the [place] office.

## 2023-09-22 NOTE — TELEPHONE ENCOUNTER
August 29, 2023  Silvia Rossi MD  to Margarito Archer MA        8/29/23  7:30 AM  Would need to be scheduled for follow up for further programming. DBS 1 hour needed

## 2023-10-31 ENCOUNTER — TELEPHONE (OUTPATIENT)
Dept: NEUROLOGY | Facility: CLINIC | Age: 78
End: 2023-10-31

## 2023-11-14 ENCOUNTER — PROCEDURE VISIT (OUTPATIENT)
Dept: NEUROLOGY | Facility: CLINIC | Age: 78
End: 2023-11-14
Payer: MEDICARE

## 2023-11-14 VITALS
DIASTOLIC BLOOD PRESSURE: 58 MMHG | SYSTOLIC BLOOD PRESSURE: 120 MMHG | HEART RATE: 75 BPM | WEIGHT: 173.6 LBS | BODY MASS INDEX: 27.19 KG/M2

## 2023-11-14 DIAGNOSIS — Z96.82 PRESENCE OF NEUROSTIMULATOR: ICD-10-CM

## 2023-11-14 DIAGNOSIS — G25.0 ESSENTIAL TREMOR: Primary | ICD-10-CM

## 2023-11-14 PROCEDURE — 95983 ALYS BRN NPGT PRGRMG 15 MIN: CPT | Performed by: PSYCHIATRY & NEUROLOGY

## 2023-11-14 PROCEDURE — 99214 OFFICE O/P EST MOD 30 MIN: CPT | Performed by: PSYCHIATRY & NEUROLOGY

## 2023-11-14 PROCEDURE — 95984 ALYS BRN NPGT PRGRMG ADDL 15: CPT | Performed by: PSYCHIATRY & NEUROLOGY

## 2023-11-14 NOTE — PROGRESS NOTES
Review of Systems   Constitutional:  Negative for appetite change, fatigue and fever. HENT:  Positive for trouble swallowing and voice change. Negative for hearing loss and tinnitus. Eyes: Negative. Negative for photophobia, pain and visual disturbance. Respiratory: Negative. Negative for shortness of breath. Cardiovascular: Negative. Negative for palpitations. Gastrointestinal: Negative. Negative for nausea and vomiting. Endocrine: Negative. Negative for cold intolerance. Genitourinary: Negative. Negative for dysuria, frequency and urgency. Musculoskeletal:  Negative for back pain, gait problem, myalgias and neck pain. Skin: Negative. Negative for rash. Allergic/Immunologic: Negative. Neurological:  Positive for tremors (Hands, has gotten worse) and speech difficulty. Negative for dizziness, seizures, syncope, facial asymmetry, weakness, light-headedness, numbness and headaches. Hematological:  Bruises/bleeds easily. Psychiatric/Behavioral: Negative. Negative for confusion, hallucinations and sleep disturbance. All other systems reviewed and are negative.

## 2023-11-14 NOTE — PROGRESS NOTES
Patient ID: Ade Richey. is a 78 y.o. male    Assessment/Plan:    Essential tremor  Returning with increase  of both hands, R>L. Right hand tremor has never been fully controlled on stimulation. Approximately 30 minutes spent on deep brain stimulation programming. See body of clinical note for details. Left on alternate group A. End of visit there was good control of left hand. Increasing stimulation on the right side only led to worsening ataxia. Reduction in amplitude with no worsening of tremor than her baseline. We therefore left on lower settings. This was reviewed with the patient. Diagnoses and all orders for this visit:    Essential tremor    Presence of neurostimulator        Subjective:      Cristo Gillette is a right-handed man with essential tremor who present for follow up. To review, action tremor started around 2009 in the right hand and spread to his left hand. Treated with primidone and metoprolol (also for HTN). He underwent L MRI focused ultrasound at [facility] neurosurgery 1/2020 but unfortunately tremor improvement was short lived only lasting 1 month. Primidone 200 mg twice daily (benefit unclear). Gabapentin up 300mg bid added. Referred to [facility] neurosurgery where he underwent bilateral VIM DBS 1/10/22. Speech changes with higher stimulation. DBS lead removal with replacement/ revision performed 1/18/23 after left neck wound infection. On 1/21/23 he had a generalized tonic clonic seizure lasting 3 minutes. He was postictal with weak pulses and underwent CPR for 5 min with return to baseline. He suffered a clavicular fracture and multiple rib fractures. CT head showed increased SDH as compared to prior imaging felt to be the cause of seizure. He continues on Keppra for seizure prophylaxis. Tremor with slight worsening of the left hand. Also notes with worsening of the right hand.   He uses his left hand for most activities but has been having more difficulty with eating and drinking. He denies any changes in gait. Speech is unchanged. Objective:    /58 (BP Location: Right arm, Patient Position: Sitting, Cuff Size: Large)   Pulse 75   Wt 78.7 kg (173 lb 9.6 oz)   BMI 27.19 kg/m²       Physical Exam  Vitals reviewed. HENT:      Left Ear: Ear canal normal.   Eyes:      Extraocular Movements: Extraocular movements intact. Neurological:      Motor: Motor strength is normal.  Psychiatric:         Speech: Speech normal.         Neurological Exam  Mental Status   Oriented to person, place, time and situation. Recent and remote memory are intact. Speech is normal. Follows complex commands. Attention and concentration are normal.    Cranial Nerves  CN III, IV, VI: Extraocular movements intact bilaterally. CN VII: Full and symmetric facial movement. CN VIII: Hearing is normal.  CN XI: Shoulder shrug strength is normal.  CN XII: Tongue midline without atrophy or fasciculations. Motor   Normal muscle tone. Strength is 5/5 throughout all four extremities. Coordination    Postural tremor with hands outstretched:   R: 2 L 0  Moderate action tremor on FTN  R: 3 L : 2 distal, prox fairly controlled. Speech slurred. No head or vocal tremor. .    Gait   Unable to rise from chair without using arms. Mildly wide based, slow , imbalance.     .               Deep brain stimulation programming:  Start: 9:33  End  : 10:03pm     [employer]- [employer]  Implanted: Jan 17th, 2022  Battery :65%  EBL: 2 years  8 month    Impedance: ok       Alternate Group A - active  Left VIM  2a:1.3  1b:1.0  1a:1.0  PW60, 170Hz , 4.1mA (1-4.3)     Right VIM  9abc-  PW90, 170Hz, 2.5mA  Increased to 2.9mA     NEW Group   Left VIM  2a: 1.3mA  1a: 1mA  PW90, 170Hz, 4mA     Right VIM  9abc-  PW90, 170Hz, 2.5mA   Increased to 23mA         Alternate Group A - active  Left VIM  2a:1.3  1b:1.0  1a:1.0  PW60, 170Hz , 2.6mA (1-4.3)- reduction with no change in tremor control  Increase amp did not appear to improve tremor      Right VIM  9abc-  PW90, 170Hz, 3mA         Mary Cota MD  Movement disorder physician  [address] [facility]

## 2023-11-14 NOTE — ASSESSMENT & PLAN NOTE
Returning with increase  of both hands, R>L. Right hand tremor has never been fully controlled on stimulation. Approximately 30 minutes spent on deep brain stimulation programming. See body of clinical note for details. Left on alternate group A. End of visit there was good control of left hand. Increasing stimulation on the right side only led to worsening ataxia. Reduction in amplitude with no worsening of tremor than her baseline. We therefore left on lower settings. This was reviewed with the patient.

## 2024-02-16 ENCOUNTER — RA CDI HCC (OUTPATIENT)
Dept: OTHER | Facility: HOSPITAL | Age: 79
End: 2024-02-16

## 2024-02-16 NOTE — PROGRESS NOTES
HCC coding opportunities          Chart Reviewed number of suggestions sent to Provider: 1     Patients Insurance     Medicare Insurance: Medicare        E11.36

## 2024-02-21 ENCOUNTER — APPOINTMENT (OUTPATIENT)
Dept: LAB | Facility: MEDICAL CENTER | Age: 79
End: 2024-02-21
Payer: MEDICARE

## 2024-02-21 DIAGNOSIS — E11.319 DIABETIC RETINOPATHY OF BOTH EYES ASSOCIATED WITH TYPE 2 DIABETES MELLITUS, MACULAR EDEMA PRESENCE UNSPECIFIED, UNSPECIFIED RETINOPATHY SEVERITY (HCC): ICD-10-CM

## 2024-02-21 DIAGNOSIS — E11.319 TYPE 2 DIABETES MELLITUS WITH RETINOPATHY OF BOTH EYES, MACULAR EDEMA PRESENCE UNSPECIFIED, UNSPECIFIED RETINOPATHY SEVERITY, UNSPECIFIED WHETHER LONG TERM INSULIN USE (HCC): ICD-10-CM

## 2024-02-21 DIAGNOSIS — E78.5 DYSLIPIDEMIA: ICD-10-CM

## 2024-02-21 DIAGNOSIS — E55.9 VITAMIN D DEFICIENCY: ICD-10-CM

## 2024-02-21 DIAGNOSIS — G25.0 ESSENTIAL TREMOR: ICD-10-CM

## 2024-02-21 DIAGNOSIS — E53.8 VITAMIN B12 DEFICIENCY: ICD-10-CM

## 2024-02-21 LAB
25(OH)D3 SERPL-MCNC: 44.7 NG/ML (ref 30–100)
BASOPHILS # BLD AUTO: 0.07 THOUSANDS/ÂΜL (ref 0–0.1)
BASOPHILS NFR BLD AUTO: 1 % (ref 0–1)
EOSINOPHIL # BLD AUTO: 0.5 THOUSAND/ÂΜL (ref 0–0.61)
EOSINOPHIL NFR BLD AUTO: 6 % (ref 0–6)
ERYTHROCYTE [DISTWIDTH] IN BLOOD BY AUTOMATED COUNT: 16.4 % (ref 11.6–15.1)
EST. AVERAGE GLUCOSE BLD GHB EST-MCNC: 131 MG/DL
HBA1C MFR BLD: 6.2 %
HCT VFR BLD AUTO: 43 % (ref 36.5–49.3)
HGB BLD-MCNC: 13.3 G/DL (ref 12–17)
IMM GRANULOCYTES # BLD AUTO: 0.03 THOUSAND/UL (ref 0–0.2)
IMM GRANULOCYTES NFR BLD AUTO: 0 % (ref 0–2)
LYMPHOCYTES # BLD AUTO: 1.87 THOUSANDS/ÂΜL (ref 0.6–4.47)
LYMPHOCYTES NFR BLD AUTO: 21 % (ref 14–44)
MCH RBC QN AUTO: 30 PG (ref 26.8–34.3)
MCHC RBC AUTO-ENTMCNC: 30.9 G/DL (ref 31.4–37.4)
MCV RBC AUTO: 97 FL (ref 82–98)
MONOCYTES # BLD AUTO: 0.79 THOUSAND/ÂΜL (ref 0.17–1.22)
MONOCYTES NFR BLD AUTO: 9 % (ref 4–12)
NEUTROPHILS # BLD AUTO: 5.6 THOUSANDS/ÂΜL (ref 1.85–7.62)
NEUTS SEG NFR BLD AUTO: 63 % (ref 43–75)
NRBC BLD AUTO-RTO: 0 /100 WBCS
PLATELET # BLD AUTO: 227 THOUSANDS/UL (ref 149–390)
PMV BLD AUTO: 10.9 FL (ref 8.9–12.7)
RBC # BLD AUTO: 4.44 MILLION/UL (ref 3.88–5.62)
TSH SERPL DL<=0.05 MIU/L-ACNC: 0.48 UIU/ML (ref 0.45–4.5)
VIT B12 SERPL-MCNC: 507 PG/ML (ref 180–914)
WBC # BLD AUTO: 8.86 THOUSAND/UL (ref 4.31–10.16)

## 2024-02-21 PROCEDURE — 82570 ASSAY OF URINE CREATININE: CPT

## 2024-02-21 PROCEDURE — 85025 COMPLETE CBC W/AUTO DIFF WBC: CPT

## 2024-02-21 PROCEDURE — 80053 COMPREHEN METABOLIC PANEL: CPT

## 2024-02-21 PROCEDURE — 84443 ASSAY THYROID STIM HORMONE: CPT

## 2024-02-21 PROCEDURE — 83036 HEMOGLOBIN GLYCOSYLATED A1C: CPT

## 2024-02-21 PROCEDURE — 82306 VITAMIN D 25 HYDROXY: CPT

## 2024-02-21 PROCEDURE — 82043 UR ALBUMIN QUANTITATIVE: CPT

## 2024-02-21 PROCEDURE — 36415 COLL VENOUS BLD VENIPUNCTURE: CPT

## 2024-02-21 PROCEDURE — 82607 VITAMIN B-12: CPT

## 2024-02-21 PROCEDURE — 80061 LIPID PANEL: CPT

## 2024-02-22 LAB
ALBUMIN SERPL BCP-MCNC: 4 G/DL (ref 3.5–5)
ALP SERPL-CCNC: 67 U/L (ref 34–104)
ALT SERPL W P-5'-P-CCNC: 11 U/L (ref 7–52)
ANION GAP SERPL CALCULATED.3IONS-SCNC: 7 MMOL/L
AST SERPL W P-5'-P-CCNC: 18 U/L (ref 13–39)
BILIRUB SERPL-MCNC: 0.49 MG/DL (ref 0.2–1)
BUN SERPL-MCNC: 27 MG/DL (ref 5–25)
CALCIUM SERPL-MCNC: 9.4 MG/DL (ref 8.4–10.2)
CHLORIDE SERPL-SCNC: 103 MMOL/L (ref 96–108)
CHOLEST SERPL-MCNC: 138 MG/DL
CO2 SERPL-SCNC: 33 MMOL/L (ref 21–32)
CREAT SERPL-MCNC: 0.63 MG/DL (ref 0.6–1.3)
CREAT UR-MCNC: 97.5 MG/DL
GFR SERPL CREATININE-BSD FRML MDRD: 95 ML/MIN/1.73SQ M
GLUCOSE P FAST SERPL-MCNC: 119 MG/DL (ref 65–99)
HDLC SERPL-MCNC: 52 MG/DL
LDLC SERPL CALC-MCNC: 74 MG/DL (ref 0–100)
MICROALBUMIN UR-MCNC: 22.7 MG/L
MICROALBUMIN/CREAT 24H UR: 23 MG/G CREATININE (ref 0–30)
POTASSIUM SERPL-SCNC: 4.7 MMOL/L (ref 3.5–5.3)
PROT SERPL-MCNC: 7 G/DL (ref 6.4–8.4)
SODIUM SERPL-SCNC: 143 MMOL/L (ref 135–147)
TRIGL SERPL-MCNC: 60 MG/DL

## 2024-02-23 ENCOUNTER — OFFICE VISIT (OUTPATIENT)
Dept: FAMILY MEDICINE CLINIC | Facility: CLINIC | Age: 79
End: 2024-02-23
Payer: MEDICARE

## 2024-02-23 VITALS
HEART RATE: 66 BPM | HEIGHT: 67 IN | OXYGEN SATURATION: 98 % | DIASTOLIC BLOOD PRESSURE: 70 MMHG | BODY MASS INDEX: 27.34 KG/M2 | RESPIRATION RATE: 18 BRPM | WEIGHT: 174.2 LBS | SYSTOLIC BLOOD PRESSURE: 130 MMHG

## 2024-02-23 DIAGNOSIS — N40.0 BENIGN PROSTATIC HYPERPLASIA, UNSPECIFIED WHETHER LOWER URINARY TRACT SYMPTOMS PRESENT: ICD-10-CM

## 2024-02-23 DIAGNOSIS — I10 ESSENTIAL HYPERTENSION: ICD-10-CM

## 2024-02-23 DIAGNOSIS — G47.33 OSA (OBSTRUCTIVE SLEEP APNEA): ICD-10-CM

## 2024-02-23 DIAGNOSIS — E11.319 TYPE 2 DIABETES MELLITUS WITH RETINOPATHY OF BOTH EYES, MACULAR EDEMA PRESENCE UNSPECIFIED, UNSPECIFIED RETINOPATHY SEVERITY, UNSPECIFIED WHETHER LONG TERM INSULIN USE (HCC): ICD-10-CM

## 2024-02-23 DIAGNOSIS — G25.0 ESSENTIAL TREMOR: ICD-10-CM

## 2024-02-23 DIAGNOSIS — R73.9 HYPERGLYCEMIA: ICD-10-CM

## 2024-02-23 DIAGNOSIS — F33.1 MODERATE EPISODE OF RECURRENT MAJOR DEPRESSIVE DISORDER (HCC): ICD-10-CM

## 2024-02-23 DIAGNOSIS — G40.409 TONIC CLONIC CONVULSION (HCC): ICD-10-CM

## 2024-02-23 DIAGNOSIS — E11.319 DIABETIC RETINOPATHY OF BOTH EYES ASSOCIATED WITH TYPE 2 DIABETES MELLITUS, MACULAR EDEMA PRESENCE UNSPECIFIED, UNSPECIFIED RETINOPATHY SEVERITY (HCC): ICD-10-CM

## 2024-02-23 DIAGNOSIS — I47.29 NSVT (NONSUSTAINED VENTRICULAR TACHYCARDIA) (HCC): Primary | ICD-10-CM

## 2024-02-23 PROBLEM — R56.9 SEIZURES (HCC): Status: RESOLVED | Noted: 2023-01-21 | Resolved: 2024-02-23

## 2024-02-23 PROCEDURE — 99214 OFFICE O/P EST MOD 30 MIN: CPT | Performed by: FAMILY MEDICINE

## 2024-02-23 RX ORDER — PAROXETINE 30 MG/1
30 TABLET, FILM COATED ORAL DAILY
Qty: 90 TABLET | Refills: 3 | Status: SHIPPED | OUTPATIENT
Start: 2024-02-23

## 2024-02-23 RX ORDER — TERAZOSIN 2 MG/1
2 CAPSULE ORAL
Qty: 90 CAPSULE | Refills: 3 | Status: SHIPPED | OUTPATIENT
Start: 2024-02-23 | End: 2024-03-24

## 2024-02-23 RX ORDER — LEVETIRACETAM 500 MG/1
500 TABLET ORAL 2 TIMES DAILY
Qty: 180 TABLET | Refills: 3 | Status: SHIPPED | OUTPATIENT
Start: 2024-02-23 | End: 2024-03-24

## 2024-02-23 RX ORDER — METOPROLOL SUCCINATE 25 MG/1
25 TABLET, EXTENDED RELEASE ORAL
Qty: 90 TABLET | Refills: 3 | Status: SHIPPED | OUTPATIENT
Start: 2024-02-23

## 2024-02-23 NOTE — PROGRESS NOTES
Assessment/Plan:       Problem List Items Addressed This Visit        Endocrine    Diabetic retinopathy ([facility])       Lab Results   Component Value Date    HGBA1C 6.2 (H) 02/21/2024   He is followed closely by ophthalmology.         Relevant Orders    Comprehensive metabolic panel    Hemoglobin A1C    Type 2 diabetes mellitus ([facility])       Lab Results   Component Value Date    HGBA1C 6.2 (H) 02/21/2024   Diabetes remains well-controlled with his current regimen.         Relevant Orders    CBC and differential    Comprehensive metabolic panel    Hemoglobin A1C    Lipid Panel with Direct LDL reflex       Respiratory    DAY (obstructive sleep apnea)       Cardiovascular and Mediastinum    Essential hypertension     Blood pressure remains well-controlled.         Relevant Medications    terazosin (HYTRIN) 2 mg capsule    metoprolol succinate (TOPROL-XL) 25 mg 24 hr tablet    Other Relevant Orders    CBC and differential    Comprehensive metabolic panel    Lipid Panel with Direct LDL reflex    NSVT (nonsustained ventricular tachycardia) ([facility]) - Primary     This remains clinically stable.  He is on a beta-blocker.         Relevant Medications    metoprolol succinate (TOPROL-XL) 25 mg 24 hr tablet    Other Relevant Orders    CBC and differential    TSH, 3rd generation with Free T4 reflex       Nervous and Auditory    Essential tremor     He is followed closely by neurology.         Relevant Medications    levETIRAcetam (KEPPRA) 500 mg tablet    Other Relevant Orders    CBC and differential    TSH, 3rd generation with Free T4 reflex       Genitourinary    BPH (benign prostatic hyperplasia)    Relevant Medications    terazosin (HYTRIN) 2 mg capsule       Other    Moderate episode of recurrent major depressive disorder (HCC)     Seems quite stable at this time with paroxetine.  He is well supported by family.         Relevant Medications    PARoxetine (PAXIL) 30 mg tablet    Other Relevant Orders    CBC and differential    TSH,  3rd generation with Free T4 reflex    Hyperglycemia    Relevant Orders    Comprehensive metabolic panel    Hemoglobin A1C   Other Visit Diagnoses     Tonic clonic convulsion (HCC)        Relevant Medications    levETIRAcetam (KEPPRA) 500 mg tablet    Other Relevant Orders    CBC and differential    Comprehensive metabolic panel            Subjective:      Patient ID: Cipriano Jiménez Jr. is a 78 y.o. male.    Patient presents today for follow-up of chronic health issues.  Overall, he seems to be doing pretty well.  He is following closely with neurology status post deep brain stimulator placement for his severe essential tremor.  He is relatively satisfied with the results.  He has a history of depression especially after his wife  several years ago but seems to be pretty well-controlled at this time.  Diabetes is well-controlled.  He denies any present chest pain, shortness of breath, palpitation or lightheadedness.          The following portions of the patient's history were reviewed and updated as appropriate: allergies, current medications, past family history, past medical history, past social history, past surgical history and problem list.      Current Outpatient Medications:   •  bisacodyl (DULCOLAX) 10 mg suppository, Insert 10 mg into the rectum, Disp: , Rfl:   •  Calcipotriene-Betameth Diprop 0.005-0.064 % FOAM, Apply up to BID, Disp: 180 g, Rfl: 3  •  Cholecalciferol 25 MCG (1000 UT) tablet, Take 1 tablet (1,000 Units total) by mouth daily Vitamin d3, Disp: 30 tablet, Rfl: 0  •  levETIRAcetam (KEPPRA) 500 mg tablet, Take 1 tablet (500 mg total) by mouth 2 (two) times a day, Disp: 180 tablet, Rfl: 3  •  metoprolol succinate (TOPROL-XL) 25 mg 24 hr tablet, Take 1 tablet (25 mg total) by mouth daily at bedtime, Disp: 90 tablet, Rfl: 3  •  Multiple Vitamin (multivitamin) tablet, Take 1 tablet by mouth daily, Disp: , Rfl:   •  PARoxetine (PAXIL) 30 mg tablet, Take 1 tablet (30 mg total) by mouth daily, Disp:  "90 tablet, Rfl: 3  •  terazosin (HYTRIN) 2 mg capsule, Take 1 capsule (2 mg total) by mouth daily at bedtime, Disp: 90 capsule, Rfl: 3  •  vitamin B-12 (CYANOCOBALAMIN) 50 MCG TABS, Take 2 tablets (100 mcg total) by mouth daily, Disp: 30 tablet, Rfl: 1  •  Docusate Sodium (DSS) 100 MG CAPS, Take 100 mg by mouth 2 (two) times a day (Patient not taking: Reported on 2/23/2024), Disp: , Rfl:   •  senna (SENOKOT) 8.6 MG tablet, Take 17.2 mg by mouth daily, Disp: , Rfl:      Review of Systems   Constitutional:  Negative for appetite change, chills, fatigue, fever and unexpected weight change.   HENT:  Negative for trouble swallowing.    Eyes:  Negative for visual disturbance.   Respiratory:  Negative for cough, chest tightness, shortness of breath and wheezing.    Cardiovascular:  Negative for chest pain, palpitations and leg swelling.   Gastrointestinal:  Negative for abdominal distention, abdominal pain, blood in stool, constipation and diarrhea.   Endocrine: Negative for polyuria.   Genitourinary:  Negative for difficulty urinating and flank pain.   Musculoskeletal:  Negative for arthralgias and myalgias.   Skin:  Negative for rash.   Neurological:  Positive for tremors. Negative for dizziness and light-headedness.   Hematological:  Negative for adenopathy. Does not bruise/bleed easily.   Psychiatric/Behavioral:  Negative for dysphoric mood and sleep disturbance. The patient is not nervous/anxious.          Objective:      /70 (BP Location: Left arm, Patient Position: Sitting, Cuff Size: Standard)   Pulse 66   Resp 18   Ht 5' 7\" (1.702 m)   Wt 79 kg (174 lb 3.2 oz)   SpO2 98%   BMI 27.28 kg/m²          Physical Exam  Vitals reviewed.   Constitutional:       General: He is not in acute distress.     Appearance: He is well-developed. He is not diaphoretic.   HENT:      Head: Normocephalic.   Eyes:      General:         Right eye: No discharge.         Left eye: No discharge.      Pupils: Pupils are equal, " round, and reactive to light.   Neck:      Thyroid: No thyromegaly.      Trachea: No tracheal deviation.   Cardiovascular:      Rate and Rhythm: Normal rate and regular rhythm.      Heart sounds: Normal heart sounds. No murmur heard.  Pulmonary:      Effort: Pulmonary effort is normal. No respiratory distress.      Breath sounds: No wheezing or rales.   Abdominal:      General: There is no distension.      Palpations: Abdomen is soft.      Tenderness: There is no abdominal tenderness.   Musculoskeletal:         General: Normal range of motion.      Right lower leg: No edema.      Left lower leg: No edema.   Lymphadenopathy:      Cervical: No cervical adenopathy.   Skin:     General: Skin is warm.      Findings: No erythema.   Neurological:      General: No focal deficit present.      Mental Status: He is alert and oriented to person, place, and time.      Gait: Gait normal.      Comments: He has chronic tremor most of the right arm   Psychiatric:         Thought Content: Thought content normal.         Judgment: Judgment normal.           Terrence Villela Jr, MD

## 2024-02-26 NOTE — ASSESSMENT & PLAN NOTE
Lab Results   Component Value Date    HGBA1C 6.2 (H) 02/21/2024   He is followed closely by ophthalmology.

## 2024-02-26 NOTE — ASSESSMENT & PLAN NOTE
Lab Results   Component Value Date    HGBA1C 6.2 (H) 02/21/2024   Diabetes remains well-controlled with his current regimen.

## 2024-02-29 ENCOUNTER — PROCEDURE VISIT (OUTPATIENT)
Dept: NEUROLOGY | Facility: CLINIC | Age: 79
End: 2024-02-29
Payer: MEDICARE

## 2024-02-29 VITALS — HEART RATE: 70 BPM | DIASTOLIC BLOOD PRESSURE: 58 MMHG | SYSTOLIC BLOOD PRESSURE: 114 MMHG

## 2024-02-29 DIAGNOSIS — Z96.82 PRESENCE OF NEUROSTIMULATOR: ICD-10-CM

## 2024-02-29 DIAGNOSIS — G25.0 ESSENTIAL TREMOR: Primary | ICD-10-CM

## 2024-02-29 PROCEDURE — 95983 ALYS BRN NPGT PRGRMG 15 MIN: CPT | Performed by: PSYCHIATRY & NEUROLOGY

## 2024-02-29 PROCEDURE — 95984 ALYS BRN NPGT PRGRMG ADDL 15: CPT | Performed by: PSYCHIATRY & NEUROLOGY

## 2024-02-29 PROCEDURE — 99214 OFFICE O/P EST MOD 30 MIN: CPT | Performed by: PSYCHIATRY & NEUROLOGY

## 2024-02-29 NOTE — PROGRESS NOTES
Patient ID: Cipriano Jiménez Jr. is a 78 y.o. male.    Assessment/Plan:    Essential tremor  Returning with increase  of both hands, R>L. Right hand tremor has never been fully controlled on stimulation.      Approximately 30 minutes spent on deep brain stimulation programming.      See body of clinical note for details.        There are no diagnoses linked to this encounter.       Subjective:    Cipriano Jiménez is a right-handed man with essential tremor who present for follow up. To review, action tremor started around 2009 in the right hand and spread to his left hand. Treated with primidone and metoprolol (also for HTN). He underwent L MRI focused ultrasound at [facility] neurosurgery 1/2020 but unfortunately tremor improvement was short lived only lasting 1 month.  Primidone 200 mg twice daily (benefit unclear). Gabapentin up 300mg bid added. Referred to [place] neurosurgery where he underwent bilateral VIM DBS 1/10/22. Speech changes with higher stimulation. DBS lead removal with replacement/ revision performed 1/18/23 after left neck wound infection. On 1/21/23 he had a generalized tonic clonic seizure lasting 3 minutes. He was postictal with weak pulses and underwent CPR for 5 min with return to baseline. He suffered a clavicular fracture and multiple rib fractures. CT head showed increased SDH as compared to prior imaging felt to be the cause of seizure. He continues on Keppra for seizure prophylaxis.       He has noted that when eating and drinking he has a runny nose, he coughs and he sneezes at times. He denies choking on the food.   Speech is slurred. May be a little worse. Sometimes he has to repeat himself.   Right hand tremor is worse. Slight worsening of the left hand.    He uses his left hand for all ADL's. He is able to feed himself with the left hand.   He denies any changes in gait. He has mild imbalance. No falls.          Objective:    Blood pressure 114/58, pulse 70.    Physical Exam  Vitals reviewed.  Patient Name: Javier Nolen   Procedure Date: 1/8/2018 11:53 AM   MRN: [id number]   Account Number: [id number]   YOB: 1934   Admit Type: Inpatient   Age: 83   Gender: Male   Race: White   Attending MD: Gerson Dietrich MD   Grafts or Implants: Grafts or Implants Not Applicable   Procedure:            Upper GI endoscopy   Indications:          Suspected pyloric stenosis, Nausea with vomiting   Providers:            Gerson Dietrich MD   Referring MD:         Kenny Paula MD   Sedation:             Monitored Anesthesia Care   Procedure:        Pre-Anesthesia Assessment:        - The heart rate, respiratory rate, oxygen saturations, blood pressure,        adequacy of pulmonary ventilation, and response to care were monitored        throughout the procedure.        A History and Physical was performed prior to the procedure. Patient        medications and allergies were reviewed. The risks and benefits of the        procedure and sedation options were discussed. Questions were answered        and informed consent was obtained. Patient identification and proposed        procedure were verified. The patient was deemed in satisfactory        condition to undergo the procedure. The heart rate, respiratory rate,        oxygen saturations, blood pressure and response to sedation were        monitored throughout the procedure.        The endoscope was passed under direct vision. The Endoscope was        introduced through the mouth, and advanced to the second part of        duodenum. The physical status of the patient was re-assessed after the        procedure. The upper GI endoscopy was accomplished without difficulty.        The patient tolerated the procedure well.   Findings:        The hypopharynx was normal.        The examined esophagus was normal.        Bilious fluid was found in the stomach.        Striped moderately erythematous mucosa without bleeding was found in the        gastric antrum.    Eyes:      Extraocular Movements: Extraocular movements intact.   Neurological:      Cranial Nerves: Dysarthria present.      Motor: Motor strength is normal.        Neurological Exam  Mental Status   Oriented only to person, place and situation. Recent and remote memory are intact. Moderate dysarthria present. Follows complex commands. Attention and concentration are normal.    Cranial Nerves  CN III, IV, VI: Extraocular movements intact bilaterally.   Right pupil: 1 mm.   Left pupil: 1 mm.  CN V: Facial sensation is normal.  CN VII: Full and symmetric facial movement.  CN VIII: Hearing is normal.  CN IX, X: Palate elevates symmetrically  CN XI: Shoulder shrug strength is normal.  CN XII: Tongue midline without atrophy or fasciculations.    Motor   Normal muscle tone. Strength is 5/5 throughout all four extremities.    Coordination    Large amplitude action tremor on FTN RUE 3  Moderate distal tremor on Left 2  No rest tremor or bradykinesia.   No head or vocal tremor  .    Gait    Left leg drag , Flexed and left elbow. Unsteady turn  .        ROS:    Review of Systems   Constitutional:  Negative for appetite change, fatigue and fever.   HENT:  Positive for trouble swallowing and voice change. Negative for hearing loss and tinnitus.    Eyes: Negative.  Negative for photophobia, pain and visual disturbance.   Respiratory: Negative.  Negative for shortness of breath.    Cardiovascular: Negative.  Negative for palpitations.   Gastrointestinal: Negative.  Negative for nausea and vomiting.   Endocrine: Negative.  Negative for cold intolerance.   Genitourinary: Negative.  Negative for dysuria, frequency and urgency.   Musculoskeletal:  Positive for gait problem (Walks slow). Negative for back pain, myalgias, neck pain and neck stiffness.   Skin: Negative.  Negative for rash.   Allergic/Immunologic: Negative.    Neurological:  Positive for tremors (Right Hand is Bad and Left hand is Mild) and speech difficulty. Negative  Biopsies were taken with a cold forceps for histology.        Suspect gastroparesis due to absence of peristalsis and retained gastric        contents.        A mild stenosis was found at the pylorus. This was traversed. A TTS        dilator was passed through the scope. Dilation with a 15 mm pyloric        balloon dilator was performed. The dilation site was examined and showed        moderate improvement in luminal narrowing. Estimated blood loss: none.        The duodenal bulb, first portion of the duodenum and second portion of        the duodenum were normal.   Impression:        - Normal hypopharynx.        - Normal esophagus.        - Bilious gastric fluid.        - Erythematous mucosa in the antrum. Biopsied.        - Gastroparesis.        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 Dilated.        - Normal duodenal bulb, first portion of the duodenum and second portion        of the duodenum.        Given the above findings, the patient may have underlying gastroparesis.   Recommendation:        - Clear liquids when awake; advance to prior diet as tolerated.        - No NSAIDs or anticoagulants.        - High dose PPI.        - If/when he is tolerating diet, he may be discharged from [facility].        Follow up with Dr. Tacos Torrez, his primary gastroenterologist.        - If he has recurrent symptoms in the future, I would recommend motility        testing prior to repeating this procedure.   Complications:        No immediate complications.   Estimated Blood Loss: Estimated blood loss: none.   Gerson Dietrich MD   1/8/2018 12:51:24 PM   This report has been signed electronically by the above.   Number of Addenda: 0        No Specimens removed during this procedure unless otherwise noted.      for dizziness, seizures, syncope, facial asymmetry, weakness, light-headedness, numbness and headaches.   Hematological: Negative.  Does not bruise/bleed easily.   Psychiatric/Behavioral: Negative.  Negative for confusion, hallucinations and sleep disturbance.    All other systems reviewed and are negative.      Deep brain stimulation programming:    [employer]- [employer]  Implanted: Jan 17th, 2022  Battery :65%  EBL: 2 years  2 month    Impedance: ok       Alternate Group A - active  Left VIM  2a:0.9  1b:0.6  1a:0.6  PW60, 170Hz , 2.6mA (1-4.3)     Right VIM  9abc-  PW90, 170Hz, 3mA     Group C   Left VIM  2a:0.9  1b:0.6  1a:0.6  PW60, 180Hz , 2.6mA (1-4.3)     Right VIM  10abc-  PW90, 180Hz, 2.6mA (0-3mA)

## 2024-02-29 NOTE — PATIENT INSTRUCTIONS
ET with habituation of tremor. Focused on left hand tremor control at this point. Mild breakthrough.     Left on Group C with utilizing different contact. Tremor better controlled.   If start to breakthrough in a few weeks use patient  to switch to Group A.     Will to maintain control by switching between Groups A and C

## 2024-02-29 NOTE — ASSESSMENT & PLAN NOTE
ET with habituation of tremor.  Bella today  with slight worsening of bilateral  R>L tremors. Right hand tremor has never been fully controlled on stimulation.    Focused on left hand tremor control at this point. Mild breakthrough.     Left on Group C with utilizing different contact. Tremor better controlled.   If start to breakthrough in a few weeks use patient  to switch to Group A.     Will to maintain control by switching between Groups A and C  Approximately 30 minutes spent on deep brain stimulation programming.      See body of clinical note for details.

## 2024-06-27 ENCOUNTER — OFFICE VISIT (OUTPATIENT)
Dept: NEUROLOGY | Facility: CLINIC | Age: 79
End: 2024-06-27
Payer: MEDICARE

## 2024-06-27 VITALS
HEART RATE: 68 BPM | WEIGHT: 187.8 LBS | SYSTOLIC BLOOD PRESSURE: 118 MMHG | DIASTOLIC BLOOD PRESSURE: 60 MMHG | BODY MASS INDEX: 29.41 KG/M2

## 2024-06-27 DIAGNOSIS — Z96.82 PRESENCE OF NEUROSTIMULATOR: ICD-10-CM

## 2024-06-27 DIAGNOSIS — G25.0 ESSENTIAL TREMOR: Primary | ICD-10-CM

## 2024-06-27 DIAGNOSIS — R13.12 OROPHARYNGEAL DYSPHAGIA: ICD-10-CM

## 2024-06-27 PROCEDURE — 95970 ALYS NPGT W/O PRGRMG: CPT | Performed by: PSYCHIATRY & NEUROLOGY

## 2024-06-27 PROCEDURE — 99214 OFFICE O/P EST MOD 30 MIN: CPT | Performed by: PSYCHIATRY & NEUROLOGY

## 2024-06-27 NOTE — PROGRESS NOTES
Review of Systems   Constitutional:  Negative for appetite change, fatigue and fever.   HENT:  Positive for trouble swallowing (At times when drinking or eating will need to cough) and voice change. Negative for hearing loss and tinnitus.    Eyes: Negative.  Negative for photophobia, pain and visual disturbance.   Respiratory: Negative.  Negative for shortness of breath.    Cardiovascular: Negative.  Negative for palpitations.   Gastrointestinal: Negative.  Negative for nausea and vomiting.   Endocrine: Negative.  Negative for cold intolerance.   Genitourinary: Negative.  Negative for dysuria, frequency and urgency.   Musculoskeletal:  Negative for back pain, gait problem, myalgias, neck pain and neck stiffness.        Balance Issues, has stayed the same     Skin: Negative.  Negative for rash.   Allergic/Immunologic: Negative.    Neurological:  Positive for tremors (Right Hand, has gotten worse) and speech difficulty ([place]). Negative for dizziness, seizures, syncope, facial asymmetry, weakness, light-headedness, numbness and headaches.   Hematological:  Bruises/bleeds easily (Bruise).   Psychiatric/Behavioral: Negative.  Negative for confusion, hallucinations and sleep disturbance.    All other systems reviewed and are negative.

## 2024-06-27 NOTE — ASSESSMENT & PLAN NOTE
ET with habituation of tremor.  The ventilator was interrogated but no changes were made.  Last adjustments did improve left hand control.  This seems to be sustained.  He is able to perform activities of daily living utilizing his left hand.  Duration of right hand tremor with posture and action.  Once again discussed the nature of this with patient.   Previously provided an alternate group that he can switch between.  And will be tremors worsen, mild to maintain control by switching between Groups A and C  See body of clinical note for details.

## 2024-06-27 NOTE — PROGRESS NOTES
Patient ID: Cipriano Jiménez Jr. is a 78 y.o. male    Assessment/Plan:    Essential tremor  ET with habituation of tremor.  The ventilator was interrogated but no changes were made.  Last adjustments did improve left hand control.  This seems to be sustained.  He is able to perform activities of daily living utilizing his left hand.  Duration of right hand tremor with posture and action.  Once again discussed the nature of this with patient.   Previously provided an alternate group that he can switch between.  And will be tremors worsen, mild to maintain control by switching between Groups A and C  See body of clinical note for details.     Oropharyngeal dysphagia  Continues to have occasional coughing with eating and drinking.  This is a chronic issue.  Last video swallow evaluation from April 2023 was reviewed.  He underwent speech therapy at this time.  He has not been maintaining exercises.  He was encouraged to continue with exercises with precautions taught by speech therapy.  Recommended he return to speech therapy but he is not interested at this time.      Diagnoses and all orders for this visit:    Essential tremor    Presence of neurostimulator    Oropharyngeal dysphagia        Subjective:      Cipriano Jiménez is a right-handed man with essential tremor who present for follow up.   To review, action tremor started around 2009 in the right hand and spread to his left hand. Treated with primidone and metoprolol (also for HTN). He underwent L MRI focused ultrasound at [facility] 1/2020 but unfortunately tremor improvement was short lived only lasting 1 month.  Primidone 200 mg twice daily (benefit unclear). Gabapentin up 300mg bid added. Referred to [facility] where he underwent bilateral VIM DBS 1/10/22. Speech changes with higher stimulation. DBS lead removal with replacement/ revision performed 1/18/23 after left neck wound infection. On 1/21/23 he had a generalized tonic clonic seizure  lasting 3 minutes. He was postictal with weak pulses and underwent CPR for 5 min with return to baseline. He suffered a clavicular fracture and multiple rib fractures. CT head showed increased SDH as compared to prior imaging felt to be the cause of seizure. He continues on Keppra for seizure prophylaxis.           Objective:    /60 (BP Location: Left arm, Patient Position: Sitting, Cuff Size: Standard)   Pulse 68   Wt 85.2 kg (187 lb 12.8 oz)   BMI 29.41 kg/m²       Physical Exam  Eyes:      Extraocular Movements: Extraocular movements intact.   Neurological:      Mental Status: He is alert.      Cranial Nerves: Dysarthria present.      Motor: Motor strength is normal.        Neurological Exam  Mental Status  Alert. Oriented only to person, place and situation. Recent and remote memory are intact. Mild dysarthria present. Speech: hypophonia. Language is fluent with no aphasia. Attention and concentration are normal.    Cranial Nerves  CN III, IV, VI: Extraocular movements intact bilaterally.   Right pupil: 1 mm.   Left pupil: 1 mm.  CN VII: Full and symmetric facial movement.  CN IX, X: Palate elevates symmetrically  CN XI: Shoulder shrug strength is normal.  CN XII: Tongue midline without atrophy or fasciculations.    Motor   Normal muscle tone. Strength is 5/5 throughout all four extremities.    Coordination    Large amplitude postural and action tremors of the right hand.  Mild ataxia.  Left hand hand with moderate distal tremor.  No all proximal tremor.  No head or vocal tremor.  No leg tremor..    Gait  Casual gait: Able to rise from chair without using arms.    Overall good mildly wide-based stride.  Mild imbalance on turn..        Deep brain stimulation programming:     [employer]  Implanted: Jan 17th, 2022  Battery :51%  EBL: 2 years  3 month    Impedance: ok       Alternate Group A - active  Left VIM  2a:0.9  1b:0.6  1a:0.6  PW60, 170Hz , 2.6mA (1-4.3)     Right VIM  9abc-  PW90, 170Hz,  3mA     Group C   Left VIM  2a:0.9  1b:0.6  1a:0.6  PW60, 180Hz , 2.6mA (1-4.3)     Right VIM  10abc-  PW90, 180Hz, 2.6mA (0-3mA)               Silvia Rossi MD  Movement disorder physician  [facility]

## 2024-06-27 NOTE — ASSESSMENT & PLAN NOTE
Continues to have occasional coughing with eating and drinking.  This is a chronic issue.  Last video swallow evaluation from April 2023 was reviewed.  He underwent speech therapy at this time.  He has not been maintaining exercises.  He was encouraged to continue with exercises with precautions taught by speech therapy.  Recommended he return to speech therapy but he is not interested at this time.

## 2024-08-26 ENCOUNTER — LAB (OUTPATIENT)
Dept: LAB | Facility: MEDICAL CENTER | Age: 79
End: 2024-08-26
Payer: MEDICARE

## 2024-08-26 DIAGNOSIS — F33.1 MODERATE EPISODE OF RECURRENT MAJOR DEPRESSIVE DISORDER (HCC): ICD-10-CM

## 2024-08-26 DIAGNOSIS — I47.29 NSVT (NONSUSTAINED VENTRICULAR TACHYCARDIA) (HCC): ICD-10-CM

## 2024-08-26 DIAGNOSIS — I10 ESSENTIAL HYPERTENSION: ICD-10-CM

## 2024-08-26 DIAGNOSIS — G40.409 TONIC CLONIC CONVULSION (HCC): ICD-10-CM

## 2024-08-26 DIAGNOSIS — R73.9 HYPERGLYCEMIA: ICD-10-CM

## 2024-08-26 DIAGNOSIS — G25.0 ESSENTIAL TREMOR: ICD-10-CM

## 2024-08-26 DIAGNOSIS — E11.319 DIABETIC RETINOPATHY OF BOTH EYES ASSOCIATED WITH TYPE 2 DIABETES MELLITUS, MACULAR EDEMA PRESENCE UNSPECIFIED, UNSPECIFIED RETINOPATHY SEVERITY (HCC): ICD-10-CM

## 2024-08-26 DIAGNOSIS — E11.319 TYPE 2 DIABETES MELLITUS WITH RETINOPATHY OF BOTH EYES, MACULAR EDEMA PRESENCE UNSPECIFIED, UNSPECIFIED RETINOPATHY SEVERITY, UNSPECIFIED WHETHER LONG TERM INSULIN USE (HCC): ICD-10-CM

## 2024-08-26 LAB
ALBUMIN SERPL BCG-MCNC: 3.6 G/DL (ref 3.5–5)
ALP SERPL-CCNC: 63 U/L (ref 34–104)
ALT SERPL W P-5'-P-CCNC: 13 U/L (ref 7–52)
ANION GAP SERPL CALCULATED.3IONS-SCNC: 6 MMOL/L (ref 4–13)
AST SERPL W P-5'-P-CCNC: 22 U/L (ref 13–39)
BASOPHILS # BLD AUTO: 0.06 THOUSANDS/ÂΜL (ref 0–0.1)
BASOPHILS NFR BLD AUTO: 1 % (ref 0–1)
BILIRUB SERPL-MCNC: 0.43 MG/DL (ref 0.2–1)
BUN SERPL-MCNC: 15 MG/DL (ref 5–25)
CALCIUM SERPL-MCNC: 8.9 MG/DL (ref 8.4–10.2)
CHLORIDE SERPL-SCNC: 103 MMOL/L (ref 96–108)
CHOLEST SERPL-MCNC: 130 MG/DL
CO2 SERPL-SCNC: 32 MMOL/L (ref 21–32)
CREAT SERPL-MCNC: 0.7 MG/DL (ref 0.6–1.3)
EOSINOPHIL # BLD AUTO: 0.38 THOUSAND/ÂΜL (ref 0–0.61)
EOSINOPHIL NFR BLD AUTO: 6 % (ref 0–6)
ERYTHROCYTE [DISTWIDTH] IN BLOOD BY AUTOMATED COUNT: 15.7 % (ref 11.6–15.1)
EST. AVERAGE GLUCOSE BLD GHB EST-MCNC: 137 MG/DL
GFR SERPL CREATININE-BSD FRML MDRD: 91 ML/MIN/1.73SQ M
GLUCOSE P FAST SERPL-MCNC: 125 MG/DL (ref 65–99)
HBA1C MFR BLD: 6.4 %
HCT VFR BLD AUTO: 41.1 % (ref 36.5–49.3)
HDLC SERPL-MCNC: 43 MG/DL
HGB BLD-MCNC: 13.3 G/DL (ref 12–17)
IMM GRANULOCYTES # BLD AUTO: 0.02 THOUSAND/UL (ref 0–0.2)
IMM GRANULOCYTES NFR BLD AUTO: 0 % (ref 0–2)
LDLC SERPL CALC-MCNC: 75 MG/DL (ref 0–100)
LYMPHOCYTES # BLD AUTO: 1.6 THOUSANDS/ÂΜL (ref 0.6–4.47)
LYMPHOCYTES NFR BLD AUTO: 23 % (ref 14–44)
MCH RBC QN AUTO: 30.3 PG (ref 26.8–34.3)
MCHC RBC AUTO-ENTMCNC: 32.4 G/DL (ref 31.4–37.4)
MCV RBC AUTO: 94 FL (ref 82–98)
MONOCYTES # BLD AUTO: 0.63 THOUSAND/ÂΜL (ref 0.17–1.22)
MONOCYTES NFR BLD AUTO: 9 % (ref 4–12)
NEUTROPHILS # BLD AUTO: 4.25 THOUSANDS/ÂΜL (ref 1.85–7.62)
NEUTS SEG NFR BLD AUTO: 61 % (ref 43–75)
NRBC BLD AUTO-RTO: 0 /100 WBCS
PLATELET # BLD AUTO: 220 THOUSANDS/UL (ref 149–390)
PMV BLD AUTO: 10.7 FL (ref 8.9–12.7)
POTASSIUM SERPL-SCNC: 4.2 MMOL/L (ref 3.5–5.3)
PROT SERPL-MCNC: 6.7 G/DL (ref 6.4–8.4)
RBC # BLD AUTO: 4.39 MILLION/UL (ref 3.88–5.62)
SODIUM SERPL-SCNC: 141 MMOL/L (ref 135–147)
TRIGL SERPL-MCNC: 58 MG/DL
TSH SERPL DL<=0.05 MIU/L-ACNC: 0.61 UIU/ML (ref 0.45–4.5)
WBC # BLD AUTO: 6.94 THOUSAND/UL (ref 4.31–10.16)

## 2024-08-26 PROCEDURE — 36415 COLL VENOUS BLD VENIPUNCTURE: CPT

## 2024-08-26 PROCEDURE — 83036 HEMOGLOBIN GLYCOSYLATED A1C: CPT

## 2024-08-26 PROCEDURE — 84443 ASSAY THYROID STIM HORMONE: CPT

## 2024-08-26 PROCEDURE — 80053 COMPREHEN METABOLIC PANEL: CPT

## 2024-08-26 PROCEDURE — 80061 LIPID PANEL: CPT

## 2024-08-26 PROCEDURE — 85025 COMPLETE CBC W/AUTO DIFF WBC: CPT

## 2024-08-30 ENCOUNTER — OFFICE VISIT (OUTPATIENT)
Dept: FAMILY MEDICINE CLINIC | Facility: CLINIC | Age: 79
End: 2024-08-30
Payer: MEDICARE

## 2024-08-30 VITALS
HEIGHT: 67 IN | DIASTOLIC BLOOD PRESSURE: 76 MMHG | OXYGEN SATURATION: 98 % | WEIGHT: 187.8 LBS | BODY MASS INDEX: 29.47 KG/M2 | RESPIRATION RATE: 18 BRPM | HEART RATE: 80 BPM | SYSTOLIC BLOOD PRESSURE: 128 MMHG

## 2024-08-30 DIAGNOSIS — E53.8 VITAMIN B12 DEFICIENCY: ICD-10-CM

## 2024-08-30 DIAGNOSIS — Z00.00 MEDICARE ANNUAL WELLNESS VISIT, SUBSEQUENT: ICD-10-CM

## 2024-08-30 DIAGNOSIS — E55.9 VITAMIN D DEFICIENCY: ICD-10-CM

## 2024-08-30 DIAGNOSIS — G40.909 SEIZURE DISORDER (HCC): ICD-10-CM

## 2024-08-30 DIAGNOSIS — R73.9 HYPERGLYCEMIA: ICD-10-CM

## 2024-08-30 DIAGNOSIS — E11.319 DIABETIC RETINOPATHY OF BOTH EYES ASSOCIATED WITH TYPE 2 DIABETES MELLITUS, MACULAR EDEMA PRESENCE UNSPECIFIED, UNSPECIFIED RETINOPATHY SEVERITY (HCC): ICD-10-CM

## 2024-08-30 DIAGNOSIS — E11.319 TYPE 2 DIABETES MELLITUS WITH RETINOPATHY OF BOTH EYES, MACULAR EDEMA PRESENCE UNSPECIFIED, UNSPECIFIED RETINOPATHY SEVERITY, UNSPECIFIED WHETHER LONG TERM INSULIN USE (HCC): ICD-10-CM

## 2024-08-30 DIAGNOSIS — G40.409 TONIC CLONIC CONVULSION (HCC): ICD-10-CM

## 2024-08-30 DIAGNOSIS — N40.0 BENIGN PROSTATIC HYPERPLASIA, UNSPECIFIED WHETHER LOWER URINARY TRACT SYMPTOMS PRESENT: ICD-10-CM

## 2024-08-30 DIAGNOSIS — E78.5 DYSLIPIDEMIA: ICD-10-CM

## 2024-08-30 DIAGNOSIS — Z12.5 PROSTATE CANCER SCREENING: ICD-10-CM

## 2024-08-30 DIAGNOSIS — I47.29 NSVT (NONSUSTAINED VENTRICULAR TACHYCARDIA) (HCC): ICD-10-CM

## 2024-08-30 DIAGNOSIS — F33.1 MODERATE EPISODE OF RECURRENT MAJOR DEPRESSIVE DISORDER (HCC): ICD-10-CM

## 2024-08-30 DIAGNOSIS — I10 ESSENTIAL HYPERTENSION: Primary | ICD-10-CM

## 2024-08-30 PROBLEM — J34.89 RHINORRHEA: Status: RESOLVED | Noted: 2021-03-31 | Resolved: 2024-08-30

## 2024-08-30 PROBLEM — R63.4 ABNORMAL WEIGHT LOSS: Status: RESOLVED | Noted: 2021-08-02 | Resolved: 2024-08-30

## 2024-08-30 PROBLEM — K59.09 OTHER CONSTIPATION: Status: RESOLVED | Noted: 2023-01-21 | Resolved: 2024-08-30

## 2024-08-30 PROCEDURE — 99214 OFFICE O/P EST MOD 30 MIN: CPT | Performed by: FAMILY MEDICINE

## 2024-08-30 PROCEDURE — G0439 PPPS, SUBSEQ VISIT: HCPCS | Performed by: FAMILY MEDICINE

## 2024-08-30 RX ORDER — PAROXETINE 30 MG/1
30 TABLET, FILM COATED ORAL DAILY
Qty: 90 TABLET | Refills: 3 | Status: SHIPPED | OUTPATIENT
Start: 2024-08-30

## 2024-08-30 RX ORDER — LEVETIRACETAM 500 MG/1
500 TABLET ORAL 2 TIMES DAILY
Qty: 180 TABLET | Refills: 3 | Status: SHIPPED | OUTPATIENT
Start: 2024-08-30

## 2024-08-30 RX ORDER — TERAZOSIN 2 MG/1
2 CAPSULE ORAL
Qty: 90 CAPSULE | Refills: 3 | Status: SHIPPED | OUTPATIENT
Start: 2024-08-30

## 2024-08-30 RX ORDER — PYRIDOXINE HCL (VITAMIN B6) 50 MG
100 TABLET ORAL DAILY
Status: SHIPPED
Start: 2024-08-30

## 2024-08-30 RX ORDER — METOPROLOL SUCCINATE 25 MG/1
25 TABLET, EXTENDED RELEASE ORAL
Qty: 90 TABLET | Refills: 3 | Status: SHIPPED | OUTPATIENT
Start: 2024-08-30

## 2024-08-30 NOTE — PROGRESS NOTES
Diabetic Foot Exam    Patient's shoes and socks removed.    Right Foot/Ankle   Right Foot Inspection  Skin Exam: skin normal, skin intact and dry skin. No warmth, no callus, no erythema, no maceration, no abnormal color, no pre-ulcer, no ulcer and no callus.     Toe Exam: ROM and strength within normal limits.     Sensory   Monofilament testing: diminished    Vascular  Capillary refills: < 3 seconds  The right DP pulse is 2+. The right PT pulse is 2+.     Left Foot/Ankle  Left Foot Inspection  Skin Exam: skin normal, skin intact and dry skin. No warmth, no erythema, no maceration, normal color, no pre-ulcer, no ulcer and no callus.     Toe Exam: ROM and strength within normal limits.     Sensory   Monofilament testing: intact    Vascular  Capillary refills: < 3 seconds  The left DP pulse is 2+. The left PT pulse is 2+.     Assign Risk Category  No deformity present  Loss of protective sensation  No weak pulses  Risk: 1

## 2024-08-30 NOTE — PROGRESS NOTES
Ambulatory Visit  Name: Cipriano Jiménez Jr.      : 1945      MRN: [id number]  Encounter Provider: Terrence Villela Jr, MD  Encounter Date: 2024   Encounter department: [facility] PRIMARY [facility]  Assessment & Plan  Medicare annual wellness visit, subsequent  He is up-to-date on vaccinations as well as his colon cancer screening.  He would like to complete another PSA.       Essential hypertension  Blood pressures are well-controlled.  Continue current regimen.  Orders:    Comprehensive metabolic panel; Future    Lipid Panel with Direct LDL reflex; Future    Type 2 diabetes mellitus with retinopathy of both eyes, macular edema presence unspecified, unspecified retinopathy severity, unspecified whether long term insulin use (HCC)  Continue routine monitoring.  He is not currently on medications for diabetes continue B12  Lab Results   Component Value Date    HGBA1C 6.4 (H) 2024       Orders:    Comprehensive metabolic panel; Future    Hemoglobin A1C; Future    Vitamin B12 deficiency  Monitor B12  Orders:    vitamin B-12 (CYANOCOBALAMIN) 50 MCG TABS; Take 2 tablets (100 mcg total) by mouth daily One tab    Vitamin B12; Future    Moderate episode of recurrent major depressive disorder (HCC)  Mood seems to be quite stable.  Paroxetine was refilled.  Orders:    PARoxetine (PAXIL) 30 mg tablet; Take 1 tablet (30 mg total) by mouth daily    Comprehensive metabolic panel; Future    TSH, 3rd generation with Free T4 reflex; Future    CBC and differential; Future    Dyslipidemia  Has a history of hyperlipidemia.  Currently declined statin therapy.  His 10-year risk is 65%.  Orders:    Comprehensive metabolic panel; Future    Lipid Panel with Direct LDL reflex; Future    Benign prostatic hyperplasia, unspecified whether lower urinary tract symptoms present    Orders:    terazosin (HYTRIN) 2 mg capsule; Take 1 capsule (2 mg total) by mouth daily at bedtime    NSVT (nonsustained ventricular  tachycardia) (HCC)  He remains on metoprolol.  Orders:    metoprolol succinate (TOPROL-XL) 25 mg 24 hr tablet; Take 1 tablet (25 mg total) by mouth daily at bedtime    Comprehensive metabolic panel; Future    TSH, 3rd generation with Free T4 reflex; Future    Tonic clonic convulsion (HCC)  He has a history of seizure disorder and remains stable on Keppra  Orders:    levETIRAcetam (KEPPRA) 500 mg tablet; Take 1 tablet (500 mg total) by mouth 2 (two) times a day    Comprehensive metabolic panel; Future    Hemoglobin A1C; Future    Lipid Panel with Direct LDL reflex; Future    TSH, 3rd generation with Free T4 reflex; Future    CBC and differential; Future    Prostate cancer screening    Orders:    PSA, Total Screen; Future    Seizure disorder (HCC)  He continues on Keppra twice daily.  Fortunately, transplant stable.         Hyperglycemia         Vitamin D deficiency  Monitor vitamin D       Diabetic retinopathy of both eyes associated with type 2 diabetes mellitus, macular edema presence unspecified, unspecified retinopathy severity (HCC)    Lab Results   Component Value Date    HGBA1C 6.4 (H) 08/26/2024                 Depression Screening and Follow-up Plan: Patient was screened for depression during today's encounter. They screened negative with a PHQ-9 score of 0.    Tobacco Cessation Counseling: The patient is sincerely urged to quit consumption of tobacco. He is not ready to quit tobacco.       Preventive health issues were discussed with patient, and age appropriate screening tests were ordered as noted in patient's After Visit Summary. Personalized health advice and appropriate referrals for health education or preventive services given if needed, as noted in patient's After Visit Summary.    History of Present Illness     HPI patient presents today for Medicare wellness as well as follow-up of chronic health issues.  He feels he is doing quite well.  Is a chronic significant essential tremor.  He has a  neurostimulator which has been partially effective.  He is following with neurology routinely.  He has a history of seizure but has remained quite stable on Keppra twice daily.  He has a history of depression but feels his mood is actually quite good at this time.  Denies any positive chest pain, shortness of breath or palpitations.    Patient Care Team:  Terrence Johnson Jr., MD as PCP - General    Review of Systems   Constitutional:  Negative for appetite change, chills, fatigue, fever and unexpected weight change.   HENT:  Negative for trouble swallowing.    Eyes:  Negative for visual disturbance.   Respiratory:  Negative for cough, chest tightness, shortness of breath and wheezing.    Cardiovascular:  Negative for chest pain, palpitations and leg swelling.   Gastrointestinal:  Negative for abdominal distention, abdominal pain, blood in stool, constipation and diarrhea.   Endocrine: Negative for polyuria.   Genitourinary:  Negative for difficulty urinating and flank pain.   Musculoskeletal:  Negative for arthralgias and myalgias.   Skin:  Negative for rash.   Neurological:  Positive for tremors. Negative for dizziness and light-headedness.   Hematological:  Negative for adenopathy. Does not bruise/bleed easily.   Psychiatric/Behavioral:  Negative for dysphoric mood and sleep disturbance. The patient is not nervous/anxious.      Medical History Reviewed by provider this encounter:       Annual Wellness Visit Questionnaire   Cipriano is here for his Subsequent Wellness visit.     Health Risk Assessment:   Patient rates overall health as good. Patient feels that their physical health rating is slightly better. Patient is satisfied with their life. Eyesight was rated as same. Hearing was rated as same. Patient feels that their emotional and mental health rating is much better. Patients states they are sometimes angry. Patient states they are never, rarely unusually tired/fatigued. Pain experienced in the last 7 days has  been none. Patient states that he has experienced no weight loss or gain in last 6 months.     Depression Screening:   PHQ-9 Score: 0      Fall Risk Screening:   In the past year, patient has experienced: no history of falling in past year      Home Safety:  Patient does not have trouble with stairs inside or outside of their home. Patient has working smoke alarms and has no working carbon monoxide detector. Home safety hazards include: none.     Nutrition:   Current diet is Regular.     Medications:   Patient is currently taking over-the-counter supplements. OTC medications include: see medication list. Patient is able to manage medications.     Activities of Daily Living (ADLs)/Instrumental Activities of Daily Living (IADLs):   Walk and transfer into and out of bed and chair?: Yes  Dress and groom yourself?: Yes    Bathe or shower yourself?: Yes    Feed yourself? Yes  Do your laundry/housekeeping?: Yes  Manage your money, pay your bills and track your expenses?: Yes  Make your own meals?: Yes    Do your own shopping?: Yes    Previous Hospitalizations:   Any hospitalizations or ED visits within the last 12 months?: No      Advance Care Planning:   Living will: Yes    Durable POA for healthcare: Yes    Advanced directive: Yes      PREVENTIVE SCREENINGS      Cardiovascular Screening:    General: Screening Current      Diabetes Screening:     General: Screening Not Indicated and History Diabetes      Prostate Cancer Screening:    General: Screening Not Indicated      Abdominal Aortic Aneurysm (AAA) Screening:    Risk factors include: tobacco use        Lung Cancer Screening:     General: Screening Not Indicated      Hepatitis C Screening:    General: Screening Current    Screening, Brief Intervention, and Referral to Treatment (SBIRT)    Screening    Typical number of drinks in a week: 0    Single Item Drug Screening:  How often have you used an illegal drug (including marijuana) or a prescription medication for  "non-medical reasons in the past year? never    Single Item Drug Screen Score: 0  Interpretation: Negative screen for possible drug use disorder    Social Determinants of Health     Financial Resource Strain: Low Risk  (8/16/2023)    Overall Financial Resource Strain (CARDIA)     Difficulty of Paying Living Expenses: Not hard at all   Food Insecurity: No Food Insecurity (8/30/2024)    Hunger Vital Sign     Worried About Running Out of Food in the Last Year: Never true     Ran Out of Food in the Last Year: Never true   Transportation Needs: No Transportation Needs (8/30/2024)    PRAPARE - Transportation     Lack of Transportation (Medical): No     Lack of Transportation (Non-Medical): No   Housing Stability: Unknown (8/30/2024)    Housing Stability Vital Sign     Unable to Pay for Housing in the Last Year: No     Homeless in the Last Year: No   Utilities: Not At Risk (8/30/2024)    [facility] Utilities     Threatened with loss of utilities: No     No results found.    Objective     /76 (BP Location: Left arm, Patient Position: Sitting, Cuff Size: Standard)   Pulse 80   Resp 18   Ht 5' 7\" (1.702 m)   Wt 85.2 kg (187 lb 12.8 oz)   SpO2 98%   BMI 29.41 kg/m²     Physical Exam  Constitutional:       General: He is not in acute distress.     Appearance: Normal appearance. He is well-developed. He is not diaphoretic.   HENT:      Head: Normocephalic.      Right Ear: External ear normal.      Left Ear: External ear normal.      Nose: Nose normal.   Eyes:      General:         Right eye: No discharge.         Left eye: No discharge.      Conjunctiva/sclera: Conjunctivae normal.      Pupils: Pupils are equal, round, and reactive to light.   Neck:      Thyroid: No thyromegaly.      Trachea: No tracheal deviation.   Cardiovascular:      Rate and Rhythm: Normal rate and regular rhythm.      Heart sounds: Normal heart sounds. No murmur heard.     No friction rub.   Pulmonary:      Effort: Pulmonary effort is normal. No " respiratory distress.      Breath sounds: Normal breath sounds. No wheezing.   Chest:      Chest wall: No tenderness.   Abdominal:      General: There is no distension.      Palpations: There is no mass.      Tenderness: There is no abdominal tenderness. There is no guarding or rebound.      Hernia: No hernia is present.   Musculoskeletal:         General: No swelling or deformity.      Cervical back: Normal range of motion.      Right lower leg: No edema.      Left lower leg: No edema.   Skin:     Findings: No erythema or rash.   Neurological:      General: No focal deficit present.      Mental Status: He is alert.      Cranial Nerves: No cranial nerve deficit.      Coordination: Coordination normal.      Gait: Gait abnormal.      Comments: He has a chronic significant tremor and to particular of his right side.  He has chronic dysarthria.   Psychiatric:         Thought Content: Thought content normal.

## 2024-08-30 NOTE — ASSESSMENT & PLAN NOTE
Orders:    terazosin (HYTRIN) 2 mg capsule; Take 1 capsule (2 mg total) by mouth daily at bedtime

## 2024-08-30 NOTE — ASSESSMENT & PLAN NOTE
Has a history of hyperlipidemia.  Currently declined statin therapy.  His 10-year risk is 65%.  Orders:    Comprehensive metabolic panel; Future    Lipid Panel with Direct LDL reflex; Future

## 2024-08-30 NOTE — ASSESSMENT & PLAN NOTE
Blood pressures are well-controlled.  Continue current regimen.  Orders:    Comprehensive metabolic panel; Future    Lipid Panel with Direct LDL reflex; Future

## 2024-08-30 NOTE — ASSESSMENT & PLAN NOTE
Monitor B12  Orders:    vitamin B-12 (CYANOCOBALAMIN) 50 MCG TABS; Take 2 tablets (100 mcg total) by mouth daily One tab    Vitamin B12; Future

## 2024-08-30 NOTE — ASSESSMENT & PLAN NOTE
Continue routine monitoring.  He is not currently on medications for diabetes continue B12  Lab Results   Component Value Date    HGBA1C 6.4 (H) 08/26/2024       Orders:    Comprehensive metabolic panel; Future    Hemoglobin A1C; Future

## 2024-08-30 NOTE — PATIENT INSTRUCTIONS
Medicare Preventive Visit Patient Instructions  Thank you for completing your Welcome to Medicare Visit or Medicare Annual Wellness Visit today. Your next wellness visit will be due in one year (8/31/2025).  The screening/preventive services that you may require over the next 5-10 years are detailed below. Some tests may not apply to you based off risk factors and/or age. Screening tests ordered at today's visit but not completed yet may show as past due. Also, please note that scanned in results may not display below.  Preventive Screenings:  Service Recommendations Previous Testing/Comments   Colorectal Cancer Screening  Colonoscopy    Fecal Occult Blood Test (FOBT)/Fecal Immunochemical Test (FIT)  Fecal DNA/Cologuard Test  Flexible Sigmoidoscopy Age: 45-75 years old   Colonoscopy: every 10 years (May be performed more frequently if at higher risk)  OR  FOBT/FIT: every 1 year  OR  Cologuard: every 3 years  OR  Sigmoidoscopy: every 5 years  Screening may be recommended earlier than age 45 if at higher risk for colorectal cancer. Also, an individualized decision between you and your healthcare provider will decide whether screening between the ages of 76-85 would be appropriate. Colonoscopy: Not on file  FOBT/FIT: Not on file  Cologuard: Not on file  Sigmoidoscopy: Not on file          Prostate Cancer Screening Individualized decision between patient and health care provider in men between ages of 55-69   Medicare will cover every 12 months beginning on the day after your 50th birthday PSA: 0.4 ng/mL           Hepatitis C Screening Once for adults born between 1945 and 1965  More frequently in patients at high risk for Hepatitis C Hep C Antibody: 06/23/2020        Diabetes Screening 1-2 times per year if you're at risk for diabetes or have pre-diabetes Fasting glucose: 125 mg/dL (8/26/2024)  A1C: 6.4 % (8/26/2024)      Cholesterol Screening Once every 5 years if you don't have a lipid disorder. May order more often  based on risk factors. Lipid panel: 08/26/2024         Other Preventive Screenings Covered by Medicare:  Abdominal Aortic Aneurysm (AAA) Screening: covered once if your at risk. You're considered to be at risk if you have a family history of AAA or a male between the age of 65-75 who smoking at least 100 cigarettes in your lifetime.  Lung Cancer Screening: covers low dose CT scan once per year if you meet all of the following conditions: (1) Age 55-77; (2) No signs or symptoms of lung cancer; (3) Current smoker or have quit smoking within the last 15 years; (4) You have a tobacco smoking history of at least 20 pack years (packs per day x number of years you smoked); (5) You get a written order from a healthcare provider.  Glaucoma Screening: covered annually if you're considered high risk: (1) You have diabetes OR (2) Family history of glaucoma OR (3)  aged 50 and older OR (4)  American aged 65 and older  Osteoporosis Screening: covered every 2 years if you meet one of the following conditions: (1) Have a vertebral abnormality; (2) On glucocorticoid therapy for more than 3 months; (3) Have primary hyperparathyroidism; (4) On osteoporosis medications and need to assess response to drug therapy.  HIV Screening: covered annually if you're between the age of 15-65. Also covered annually if you are younger than 15 and older than 65 with risk factors for HIV infection. For pregnant patients, it is covered up to 3 times per pregnancy.    Immunizations:  Immunization Recommendations   Influenza Vaccine Annual influenza vaccination during flu season is recommended for all persons aged >= 6 months who do not have contraindications   Pneumococcal Vaccine   * Pneumococcal conjugate vaccine = PCV13 (Prevnar 13), PCV15 (Vaxneuvance), PCV20 (Prevnar 20)  * Pneumococcal polysaccharide vaccine = PPSV23 (Pneumovax) Adults 19-65 yo with certain risk factors or if 65+ yo  If never received any pneumonia vaccine:  recommend Prevnar 20 (PCV20)  Give PCV20 if previously received 1 dose of PCV13 or PPSV23   Hepatitis B Vaccine 3 dose series if at intermediate or high risk (ex: diabetes, end stage renal disease, liver disease)   Respiratory syncytial virus (RSV) Vaccine - COVERED BY MEDICARE PART D  * RSVPreF3 (Arexvy) CDC recommends that adults 60 years of age and older may receive a single dose of RSV vaccine using shared clinical decision-making (SCDM)   Tetanus (Td) Vaccine - COST NOT COVERED BY MEDICARE PART B Following completion of primary series, a booster dose should be given every 10 years to maintain immunity against tetanus. Td may also be given as tetanus wound prophylaxis.   Tdap Vaccine - COST NOT COVERED BY MEDICARE PART B Recommended at least once for all adults. For pregnant patients, recommended with each pregnancy.   Shingles Vaccine (Shingrix) - COST NOT COVERED BY MEDICARE PART B  2 shot series recommended in those 19 years and older who have or will have weakened immune systems or those 50 years and older     Health Maintenance Due:      Topic Date Due   • Hepatitis C Screening  Completed     Immunizations Due:      Topic Date Due   • Pneumococcal Vaccine: 65+ Years (1 of 2 - PCV) Never done   • COVID-19 Vaccine (4 - 2023-24 season) 09/01/2023   • Influenza Vaccine (1) 09/01/2024     Advance Directives   What are advance directives?  Advance directives are legal documents that state your wishes and plans for medical care. These plans are made ahead of time in case you lose your ability to make decisions for yourself. Advance directives can apply to any medical decision, such as the treatments you want, and if you want to donate organs.   What are the types of advance directives?  There are many types of advance directives, and each state has rules about how to use them. You may choose a combination of any of the following:  Living will:  This is a written record of the treatment you want. You can also  choose which treatments you do not want, which to limit, and which to stop at a certain time. This includes surgery, medicine, IV fluid, and tube feedings.   Durable power of  for healthcare (DPAHC):  This is a written record that states who you want to make healthcare choices for you when you are unable to make them for yourself. This person, called a proxy, is usually a family member or a friend. You may choose more than 1 proxy.  Do not resuscitate (DNR) order:  A DNR order is used in case your heart stops beating or you stop breathing. It is a request not to have certain forms of treatment, such as CPR. A DNR order may be included in other types of advance directives.  Medical directive:  This covers the care that you want if you are in a coma, near death, or unable to make decisions for yourself. You can list the treatments you want for each condition. Treatment may include pain medicine, surgery, blood transfusions, dialysis, IV or tube feedings, and a ventilator (breathing machine).  Values history:  This document has questions about your views, beliefs, and how you feel and think about life. This information can help others choose the care that you would choose.  Why are advance directives important?  An advance directive helps you control your care. Although spoken wishes may be used, it is better to have your wishes written down. Spoken wishes can be misunderstood, or not followed. Treatments may be given even if you do not want them. An advance directive may make it easier for your family to make difficult choices about your care.   Cigarette Smoking and Your Health   Risks to your health if you smoke:  Nicotine and other chemicals found in tobacco damage every cell in your body. Even if you are a light smoker, you have an increased risk for cancer, heart disease, and lung disease. If you are pregnant or have diabetes, smoking increases your risk for complications.   Benefits to your health if you  stop smoking:   You decrease respiratory symptoms such as coughing, wheezing, and shortness of breath.   You reduce your risk for cancers of the lung, mouth, throat, kidney, bladder, pancreas, stomach, and cervix. If you already have cancer, you increase the benefits of chemotherapy. You also reduce your risk for cancer returning or a second cancer from developing.   You reduce your risk for heart disease, blood clots, heart attack, and stroke.   You reduce your risk for lung infections, and diseases such as pneumonia, asthma, chronic bronchitis, and emphysema.  Your circulation improves. More oxygen can be delivered to your body. If you have diabetes, you lower your risk for complications, such as kidney, artery, and eye diseases. You also lower your risk for nerve damage. Nerve damage can lead to amputations, poor vision, and blindness.  You improve your body's ability to heal and to fight infections.  For more information and support to stop smoking:   [place]  Phone: 6- 338 - [phone]  Web Address: www.[employer]  Weight Management   Why it is important to manage your weight:  Being overweight increases your risk of health conditions such as heart disease, high blood pressure, type 2 diabetes, and certain types of cancer. It can also increase your risk for osteoarthritis, sleep apnea, and other respiratory problems. Aim for a slow, steady weight loss. Even a small amount of weight loss can lower your risk of health problems.  How to lose weight safely:  A safe and healthy way to lose weight is to eat fewer calories and get regular exercise. You can lose up about 1 pound a week by decreasing the number of calories you eat by 500 calories each day.   Healthy meal plan for weight management:  A healthy meal plan includes a variety of foods, contains fewer calories, and helps you stay healthy. A healthy meal plan includes the following:  Eat whole-grain foods more often.  A healthy meal plan should contain  fiber. Fiber is the part of grains, fruits, and vegetables that is not broken down by your body. Whole-grain foods are healthy and provide extra fiber in your diet. Some examples of whole-grain foods are whole-wheat breads and pastas, oatmeal, brown rice, and bulgur.  Eat a variety of vegetables every day.  Include dark, leafy greens such as spinach, kale, helena greens, and mustard greens. Eat yellow and orange vegetables such as carrots, sweet potatoes, and winter squash.   Eat a variety of fruits every day.  Choose fresh or canned fruit (canned in its own juice or light syrup) instead of juice. Fruit juice has very little or no fiber.  Eat low-fat dairy foods.  Drink fat-free (skim) milk or 1% milk. Eat fat-free yogurt and low-fat cottage cheese. Try low-fat cheeses such as mozzarella and other reduced-fat cheeses.  Choose meat and other protein foods that are low in fat.  Choose beans or other legumes such as split peas or lentils. Choose fish, skinless poultry (chicken or turkey), or lean cuts of red meat (beef or pork). Before you cook meat or poultry, cut off any visible fat.   Use less fat and oil.  Try baking foods instead of frying them. Add less fat, such as margarine, sour cream, regular salad dressing and mayonnaise to foods. Eat fewer high-fat foods. Some examples of high-fat foods include french fries, doughnuts, ice cream, and cakes.  Eat fewer sweets.  Limit foods and drinks that are high in sugar. This includes candy, cookies, regular soda, and sweetened drinks.  Exercise:  Exercise at least 30 minutes per day on most days of the week. Some examples of exercise include walking, biking, dancing, and swimming. You can also fit in more physical activity by taking the stairs instead of the elevator or parking farther away from stores. Ask your healthcare provider about the best exercise plan for you.      © Copyright [employer] 2018 Information is for End User's use only and may not be sold,  redistributed or otherwise used for commercial purposes. All illustrations and images included in CareNotes® are the copyrighted property of [email or web address] or [employer]

## 2024-08-30 NOTE — ASSESSMENT & PLAN NOTE
Mood seems to be quite stable.  Paroxetine was refilled.  Orders:    PARoxetine (PAXIL) 30 mg tablet; Take 1 tablet (30 mg total) by mouth daily    Comprehensive metabolic panel; Future    TSH, 3rd generation with Free T4 reflex; Future    CBC and differential; Future

## 2025-02-05 ENCOUNTER — PROCEDURE VISIT (OUTPATIENT)
Dept: NEUROLOGY | Facility: CLINIC | Age: 80
End: 2025-02-05
Payer: MEDICARE

## 2025-02-05 VITALS
WEIGHT: 200.3 LBS | BODY MASS INDEX: 31.37 KG/M2 | SYSTOLIC BLOOD PRESSURE: 118 MMHG | DIASTOLIC BLOOD PRESSURE: 60 MMHG | HEART RATE: 68 BPM

## 2025-02-05 DIAGNOSIS — G40.909 SEIZURE DISORDER (HCC): ICD-10-CM

## 2025-02-05 DIAGNOSIS — G25.0 ESSENTIAL TREMOR: Primary | ICD-10-CM

## 2025-02-05 PROCEDURE — 95970 ALYS NPGT W/O PRGRMG: CPT | Performed by: PSYCHIATRY & NEUROLOGY

## 2025-02-05 PROCEDURE — 99214 OFFICE O/P EST MOD 30 MIN: CPT | Performed by: PSYCHIATRY & NEUROLOGY

## 2025-02-05 NOTE — PROGRESS NOTES
Review of Systems   Constitutional: Negative.  Negative for appetite change, fatigue and fever.   HENT:  Positive for voice change. Negative for hearing loss, tinnitus and trouble swallowing.    Eyes: Negative.  Negative for photophobia, pain and visual disturbance.   Respiratory: Negative.  Negative for shortness of breath.    Cardiovascular: Negative.  Negative for palpitations.   Gastrointestinal: Negative.  Negative for nausea and vomiting.   Endocrine: Negative.  Negative for cold intolerance.   Genitourinary: Negative.  Negative for dysuria, frequency and urgency.   Musculoskeletal:  Negative for back pain, gait problem, myalgias, neck pain and neck stiffness.        Balance Issues, has stayed about the same     Skin: Negative.  Negative for rash.   Allergic/Immunologic: Negative.    Neurological:  Positive for tremors (Both Hands Right worse than Left, states he is able to do things with Left Hand but difficult with the Right Hand) and speech difficulty (A little bit). Negative for dizziness, seizures, syncope, facial asymmetry, weakness, light-headedness, numbness and headaches.   Hematological:  Bruises/bleeds easily (Bruise).   Psychiatric/Behavioral: Negative.  Negative for confusion, hallucinations and sleep disturbance.    All other systems reviewed and are negative.

## 2025-02-05 NOTE — PROGRESS NOTES
Name: Cipriano Jiménez Jr.      : 1945      MRN: [id number]  Encounter Provider: Silvia Rossi MD  Encounter Date: 2025   Encounter department: [facility]  :  Assessment & Plan  Essential tremor  ET with habituation of tremor particular on the right.  Left hand tremor remains fairly well-controlled.  He has switched to using his left hand for all activities.  He feels he is able to manage with ADLs utilizing his left hand.  Also with mild dysarthria and imbalance.  Deep brain stimulation was interrogated for battery and impedance check.  No impedance changes.  Estimated battery life at this point is 1 year 8 months.    If he is having breakthrough tremor, he can try alternating between group a and C.  No changes were made to stimulation.  See body of the clinical note for details.       Seizure disorder (HCC)  History of a generalized tonic-clonic seizure with postictal state with weak pulses for which he underwent CPR and suffered clavicular and rib fractures.  He is remained on Keppra 500 mg twice daily for seizure prophylaxis.  He has remained seizure-free for at least 2 years now.  We did discuss the option of trying to discontinue Keppra.  Given history of brain surgeries with revisions and the fact that he has no side effects he opted to continue on Keppra rather than risk of recurrent seizure.              History of Present Illness   Cipriano Jiménez is a right-handed man with essential tremor who present for follow up.     To review, action tremor started around  in the right hand and spread to his left hand. Treated with primidone and metoprolol (also for HTN). He underwent L MRI focused ultrasound at [facility] 2020 but unfortunately tremor improvement was short lived only lasting 1 month.  Primidone 200 mg twice daily (benefit unclear). Gabapentin up 300mg bid added. Referred to [place] [facility] where he underwent bilateral VIM DBS 1/10/22.  Speech changes with higher stimulation. DBS lead removal with replacement/ revision performed 1/18/23 after left neck wound infection. On 1/21/23 he had a generalized tonic clonic seizure lasting 3 minutes. He was postictal with weak pulses and underwent CPR for 5 min with return to baseline. He suffered a clavicular fracture and multiple rib fractures. CT head showed increased SDH as compared to prior imaging felt to be the cause of seizure. He continues on Keppra for seizure prophylaxis.     Right hand tremor is worse. Left had tremor remains fairly well controlled. He has switched to using hie left hand for almost all activities.    He is slow and unsteady when walking but no worse that when last seen. No falls.  Speech is unchanged. Slow and slightly slurred.   No dysphagia. No cognitive changes.    ADL's performed with independently. He lives with his son.         Review of Systems I have personally reviewed the MA's review of systems and made changes as necessary.         Objective   There were no vitals taken for this visit.    Physical Exam  Vitals reviewed.   Eyes:      Extraocular Movements: Extraocular movements intact.      Pupils: Pupils are equal, round, and reactive to light.   Neurological:      Cranial Nerves: Dysarthria present.      Motor: Motor strength is normal.      Neurological Exam  Mental Status   Oriented only to person, place and situation. Recent and remote memory are intact. Mild dysarthria present. Occasional need to repeat. Follows complex commands. Attention and concentration are normal.    Cranial Nerves  CN III, IV, VI: Extraocular movements intact bilaterally. Pupils equal round and reactive to light bilaterally.  CN VII: Full and symmetric facial movement.  CN VIII: Hearing is normal.  CN IX, X: Palate elevates symmetrically  CN XI: Shoulder shrug strength is normal.  CN XII: Tongue midline without atrophy or fasciculations.    Motor   Normal muscle tone. Strength is 5/5  throughout all four extremities.    Coordination    Slight intentional tremor on the finger-to-nose.  No rest tremor.  No posturing.  Right hand with large amplitude, distal greater than proximal tremor on finger-to-nose.  Moderate amplitude postural tremor with hand outstretched.  No posturing.  No rest tremor bradykinesia.  No leg tremor.  No head tremor.  No vocal tremor..    Gait    Wide-based.  Slow with shortened stride..      Deep brain stimulation programming: no changes made      Percept PC- Moped  Implanted: Jan 17th, 2022  Battery :38%  [id number]  EBL: 1 years  8 month    Impedance: ok       Alternate Group A   Left VIM  2a:0.9  1b:0.6  1a:0.6  PW60, 170Hz , 2.6mA (1-4.3)     Right VIM  9abc-  PW90, 170Hz, 3mA     Group C- active     Left VIM  2a:0.9  1b:0.6  1a:0.6  PW60, 180Hz , 2.6mA (1-4.3)     Right VIM  10abc-  PW90, 180Hz, 2.6mA (0-3mA)

## 2025-02-05 NOTE — ASSESSMENT & PLAN NOTE
History of a generalized tonic-clonic seizure with postictal state with weak pulses for which he underwent CPR and suffered clavicular and rib fractures.  He is remained on Keppra 500 mg twice daily for seizure prophylaxis.  He has remained seizure-free for at least 2 years now.  We did discuss the option of trying to discontinue Keppra.  Given history of brain surgeries with revisions and the fact that he has no side effects he opted to continue on Keppra rather than risk of recurrent seizure.

## 2025-02-05 NOTE — ASSESSMENT & PLAN NOTE
ET with habituation of tremor particular on the right.  Left hand tremor remains fairly well-controlled.  He has switched to using his left hand for all activities.  He feels he is able to manage with ADLs utilizing his left hand.  Also with mild dysarthria and imbalance.  Deep brain stimulation was interrogated for battery and impedance check.  No impedance changes.  Estimated battery life at this point is 1 year 8 months.    If he is having breakthrough tremor, he can try alternating between group a and C.  No changes were made to stimulation.  See body of the clinical note for details.

## 2025-02-25 ENCOUNTER — APPOINTMENT (OUTPATIENT)
Dept: LAB | Facility: MEDICAL CENTER | Age: 80
End: 2025-02-25
Payer: MEDICARE

## 2025-02-25 DIAGNOSIS — I10 ESSENTIAL HYPERTENSION: ICD-10-CM

## 2025-02-25 DIAGNOSIS — Z12.5 PROSTATE CANCER SCREENING: ICD-10-CM

## 2025-02-25 DIAGNOSIS — E11.319 TYPE 2 DIABETES MELLITUS WITH RETINOPATHY OF BOTH EYES, MACULAR EDEMA PRESENCE UNSPECIFIED, UNSPECIFIED RETINOPATHY SEVERITY, UNSPECIFIED WHETHER LONG TERM INSULIN USE (HCC): ICD-10-CM

## 2025-02-25 DIAGNOSIS — F33.1 MODERATE EPISODE OF RECURRENT MAJOR DEPRESSIVE DISORDER (HCC): ICD-10-CM

## 2025-02-25 DIAGNOSIS — I47.29 NSVT (NONSUSTAINED VENTRICULAR TACHYCARDIA) (HCC): ICD-10-CM

## 2025-02-25 DIAGNOSIS — G40.409 TONIC CLONIC CONVULSION (HCC): ICD-10-CM

## 2025-02-25 DIAGNOSIS — E53.8 VITAMIN B12 DEFICIENCY: ICD-10-CM

## 2025-02-25 DIAGNOSIS — E78.5 DYSLIPIDEMIA: ICD-10-CM

## 2025-02-25 LAB
ALBUMIN SERPL BCG-MCNC: 3.9 G/DL (ref 3.5–5)
ALP SERPL-CCNC: 62 U/L (ref 34–104)
ALT SERPL W P-5'-P-CCNC: 10 U/L (ref 7–52)
ANION GAP SERPL CALCULATED.3IONS-SCNC: 6 MMOL/L (ref 4–13)
AST SERPL W P-5'-P-CCNC: 21 U/L (ref 13–39)
BASOPHILS # BLD AUTO: 0.06 THOUSANDS/ÂΜL (ref 0–0.1)
BASOPHILS NFR BLD AUTO: 1 % (ref 0–1)
BILIRUB SERPL-MCNC: 0.48 MG/DL (ref 0.2–1)
BUN SERPL-MCNC: 19 MG/DL (ref 5–25)
CALCIUM SERPL-MCNC: 9.2 MG/DL (ref 8.4–10.2)
CHLORIDE SERPL-SCNC: 103 MMOL/L (ref 96–108)
CHOLEST SERPL-MCNC: 124 MG/DL (ref ?–200)
CO2 SERPL-SCNC: 32 MMOL/L (ref 21–32)
CREAT SERPL-MCNC: 0.75 MG/DL (ref 0.6–1.3)
EOSINOPHIL # BLD AUTO: 0.46 THOUSAND/ÂΜL (ref 0–0.61)
EOSINOPHIL NFR BLD AUTO: 5 % (ref 0–6)
ERYTHROCYTE [DISTWIDTH] IN BLOOD BY AUTOMATED COUNT: 14.3 % (ref 11.6–15.1)
EST. AVERAGE GLUCOSE BLD GHB EST-MCNC: 134 MG/DL
GFR SERPL CREATININE-BSD FRML MDRD: 88 ML/MIN/1.73SQ M
GLUCOSE P FAST SERPL-MCNC: 124 MG/DL (ref 65–99)
HBA1C MFR BLD: 6.3 %
HCT VFR BLD AUTO: 42.6 % (ref 36.5–49.3)
HDLC SERPL-MCNC: 44 MG/DL
HGB BLD-MCNC: 13.7 G/DL (ref 12–17)
IMM GRANULOCYTES # BLD AUTO: 0.03 THOUSAND/UL (ref 0–0.2)
IMM GRANULOCYTES NFR BLD AUTO: 0 % (ref 0–2)
LDLC SERPL CALC-MCNC: 68 MG/DL (ref 0–100)
LYMPHOCYTES # BLD AUTO: 2.11 THOUSANDS/ÂΜL (ref 0.6–4.47)
LYMPHOCYTES NFR BLD AUTO: 23 % (ref 14–44)
MCH RBC QN AUTO: 31.4 PG (ref 26.8–34.3)
MCHC RBC AUTO-ENTMCNC: 32.2 G/DL (ref 31.4–37.4)
MCV RBC AUTO: 98 FL (ref 82–98)
MONOCYTES # BLD AUTO: 0.9 THOUSAND/ÂΜL (ref 0.17–1.22)
MONOCYTES NFR BLD AUTO: 10 % (ref 4–12)
NEUTROPHILS # BLD AUTO: 5.56 THOUSANDS/ÂΜL (ref 1.85–7.62)
NEUTS SEG NFR BLD AUTO: 61 % (ref 43–75)
NRBC BLD AUTO-RTO: 0 /100 WBCS
PLATELET # BLD AUTO: 244 THOUSANDS/UL (ref 149–390)
PMV BLD AUTO: 10.6 FL (ref 8.9–12.7)
POTASSIUM SERPL-SCNC: 4.4 MMOL/L (ref 3.5–5.3)
PROT SERPL-MCNC: 7.1 G/DL (ref 6.4–8.4)
PSA SERPL-MCNC: 0.55 NG/ML (ref 0–4)
RBC # BLD AUTO: 4.37 MILLION/UL (ref 3.88–5.62)
SODIUM SERPL-SCNC: 141 MMOL/L (ref 135–147)
TRIGL SERPL-MCNC: 62 MG/DL (ref ?–150)
TSH SERPL DL<=0.05 MIU/L-ACNC: 0.73 UIU/ML (ref 0.45–4.5)
VIT B12 SERPL-MCNC: 574 PG/ML (ref 180–914)
WBC # BLD AUTO: 9.12 THOUSAND/UL (ref 4.31–10.16)

## 2025-02-25 PROCEDURE — 36415 COLL VENOUS BLD VENIPUNCTURE: CPT

## 2025-02-25 PROCEDURE — 80061 LIPID PANEL: CPT

## 2025-02-25 PROCEDURE — 85025 COMPLETE CBC W/AUTO DIFF WBC: CPT

## 2025-02-25 PROCEDURE — G0103 PSA SCREENING: HCPCS

## 2025-02-25 PROCEDURE — 80053 COMPREHEN METABOLIC PANEL: CPT

## 2025-02-25 PROCEDURE — 84443 ASSAY THYROID STIM HORMONE: CPT

## 2025-02-25 PROCEDURE — 83036 HEMOGLOBIN GLYCOSYLATED A1C: CPT

## 2025-02-25 PROCEDURE — 82607 VITAMIN B-12: CPT

## 2025-02-26 ENCOUNTER — RESULTS FOLLOW-UP (OUTPATIENT)
Dept: FAMILY MEDICINE CLINIC | Facility: CLINIC | Age: 80
End: 2025-02-26

## 2025-02-28 ENCOUNTER — OFFICE VISIT (OUTPATIENT)
Dept: FAMILY MEDICINE CLINIC | Facility: CLINIC | Age: 80
End: 2025-02-28
Payer: MEDICARE

## 2025-02-28 VITALS
HEART RATE: 80 BPM | OXYGEN SATURATION: 94 % | RESPIRATION RATE: 18 BRPM | DIASTOLIC BLOOD PRESSURE: 60 MMHG | WEIGHT: 200.2 LBS | SYSTOLIC BLOOD PRESSURE: 126 MMHG | BODY MASS INDEX: 31.42 KG/M2 | HEIGHT: 67 IN

## 2025-02-28 DIAGNOSIS — E53.8 VITAMIN B12 DEFICIENCY: ICD-10-CM

## 2025-02-28 DIAGNOSIS — E55.9 VITAMIN D DEFICIENCY: ICD-10-CM

## 2025-02-28 DIAGNOSIS — G40.409 TONIC CLONIC CONVULSION (HCC): ICD-10-CM

## 2025-02-28 DIAGNOSIS — I10 ESSENTIAL HYPERTENSION: ICD-10-CM

## 2025-02-28 DIAGNOSIS — G25.0 ESSENTIAL TREMOR: ICD-10-CM

## 2025-02-28 DIAGNOSIS — G40.909 SEIZURE DISORDER (HCC): ICD-10-CM

## 2025-02-28 DIAGNOSIS — I47.29 NSVT (NONSUSTAINED VENTRICULAR TACHYCARDIA) (HCC): ICD-10-CM

## 2025-02-28 DIAGNOSIS — F33.1 MODERATE EPISODE OF RECURRENT MAJOR DEPRESSIVE DISORDER (HCC): ICD-10-CM

## 2025-02-28 DIAGNOSIS — E78.5 DYSLIPIDEMIA: ICD-10-CM

## 2025-02-28 DIAGNOSIS — E11.319 TYPE 2 DIABETES MELLITUS WITH RETINOPATHY OF BOTH EYES, MACULAR EDEMA PRESENCE UNSPECIFIED, UNSPECIFIED RETINOPATHY SEVERITY, UNSPECIFIED WHETHER LONG TERM INSULIN USE (HCC): Primary | ICD-10-CM

## 2025-02-28 DIAGNOSIS — N40.0 BENIGN PROSTATIC HYPERPLASIA, UNSPECIFIED WHETHER LOWER URINARY TRACT SYMPTOMS PRESENT: ICD-10-CM

## 2025-02-28 PROCEDURE — 99214 OFFICE O/P EST MOD 30 MIN: CPT | Performed by: FAMILY MEDICINE

## 2025-02-28 PROCEDURE — G2211 COMPLEX E/M VISIT ADD ON: HCPCS | Performed by: FAMILY MEDICINE

## 2025-02-28 RX ORDER — LEVETIRACETAM 500 MG/1
500 TABLET ORAL 2 TIMES DAILY
Qty: 180 TABLET | Refills: 3 | Status: SHIPPED | OUTPATIENT
Start: 2025-02-28

## 2025-02-28 RX ORDER — TERAZOSIN 2 MG/1
2 CAPSULE ORAL
Qty: 90 CAPSULE | Refills: 3 | Status: SHIPPED | OUTPATIENT
Start: 2025-02-28

## 2025-02-28 RX ORDER — PAROXETINE 30 MG/1
30 TABLET, FILM COATED ORAL DAILY
Qty: 90 TABLET | Refills: 3 | Status: SHIPPED | OUTPATIENT
Start: 2025-02-28

## 2025-02-28 RX ORDER — METOPROLOL SUCCINATE 25 MG/1
25 TABLET, EXTENDED RELEASE ORAL
Qty: 90 TABLET | Refills: 3 | Status: SHIPPED | OUTPATIENT
Start: 2025-02-28

## 2025-02-28 NOTE — ASSESSMENT & PLAN NOTE
Orders:    metoprolol succinate (TOPROL-XL) 25 mg 24 hr tablet; Take 1 tablet (25 mg total) by mouth daily at bedtime

## 2025-02-28 NOTE — ASSESSMENT & PLAN NOTE
He is doing very well with his left hand.  Right hand still has very significant intention tremor.  He follows closely with Dr. Reagan.  He is status post simulated  Orders:    TSH, 3rd generation with Free T4 reflex; Future

## 2025-02-28 NOTE — PROGRESS NOTES
Name: Cipriano Jiménez Jr.      : 1945      MRN: [id number]  Encounter Provider: Terrence Villela Jr, MD  Encounter Date: 2025   Encounter department: [facility] PR[facility]  :  Assessment & Plan  Type 2 diabetes mellitus with retinopathy of both eyes, macular edema presence unspecified, unspecified retinopathy severity, unspecified whether long term insulin use (HCC)    Lab Results   Component Value Date    HGBA1C 6.3 (H) 2025     Monitor A1c in 6 months.  He is on no medications at this point and has been watching his diet.  Orders:    CBC and differential; Future    Comprehensive metabolic panel; Future    Hemoglobin A1C; Future    Tonic clonic convulsion (HCC)  Refill Keppra.  Check labs prior to follow-up.  Orders:    levETIRAcetam (KEPPRA) 500 mg tablet; Take 1 tablet (500 mg total) by mouth 2 (two) times a day    CBC and differential; Future    Comprehensive metabolic panel; Future    Lipid Panel with Direct LDL reflex; Future    TSH, 3rd generation with Free T4 reflex; Future    NSVT (nonsustained ventricular tachycardia) (HCC)    Orders:    metoprolol succinate (TOPROL-XL) 25 mg 24 hr tablet; Take 1 tablet (25 mg total) by mouth daily at bedtime    Moderate episode of recurrent major depressive disorder (HCC)      Orders:    PARoxetine (PAXIL) 30 mg tablet; Take 1 tablet (30 mg total) by mouth daily    TSH, 3rd generation with Free T4 reflex; Future    Benign prostatic hyperplasia, unspecified whether lower urinary tract symptoms present    Orders:    terazosin (HYTRIN) 2 mg capsule; Take 1 capsule (2 mg total) by mouth daily at bedtime    Essential hypertension    Orders:    CBC and differential; Future    Comprehensive metabolic panel; Future    Lipid Panel with Direct LDL reflex; Future    TSH, 3rd generation with Free T4 reflex; Future    Vitamin D deficiency    Orders:    Vitamin D 25 hydroxy; Future    Vitamin B12 deficiency    Orders:    Vitamin B12;  Future    Dyslipidemia  Monitor labs prior to follow-up  Orders:    Comprehensive metabolic panel; Future    Lipid Panel with Direct LDL reflex; Future    Essential tremor  He is doing very well with his left hand.  Right hand still has very significant intention tremor.  He follows closely with Dr. Reagan.  He is status post simulated  Orders:    TSH, 3rd generation with Free T4 reflex; Future    Seizure disorder (HCC)  Stable at this time.  Does remain on Keppra.  He is followed by neurology but I refilled his Keppra for him.              History of Present Illness   HPI patient presents for follow-up chronic health issues.  He has no acute complaints.  He has a very significant right intention tremor but his left hand has improved very significantly status post intracranial stimulator.  He is followed closely by Dr. Reagan.  He has a history of seizure and remains on Keppra.  He has a history of type 2 diabetes and this is diet controlled at this time.  He denies any chest pain, shortness of breath or palpitations  Review of Systems   Constitutional:  Negative for appetite change, chills, fatigue, fever and unexpected weight change.   HENT:  Negative for trouble swallowing.    Eyes:  Negative for visual disturbance.   Respiratory:  Negative for cough, chest tightness, shortness of breath and wheezing.    Cardiovascular:  Negative for chest pain, palpitations and leg swelling.   Gastrointestinal:  Negative for abdominal distention, abdominal pain, blood in stool, constipation and diarrhea.   Endocrine: Negative for polyuria.   Genitourinary:  Negative for difficulty urinating and flank pain.   Musculoskeletal:  Negative for arthralgias and myalgias.   Skin:  Negative for rash.   Neurological:  Positive for tremors. Negative for dizziness and light-headedness.   Hematological:  Negative for adenopathy. Does not bruise/bleed easily.   Psychiatric/Behavioral:  Negative for dysphoric mood and sleep disturbance. The  "patient is not nervous/anxious.        Objective   /60 (BP Location: Left arm, Patient Position: Sitting, Cuff Size: Standard)   Pulse 80   Resp 18   Ht 5' 7\" (1.702 m)   Wt 90.8 kg (200 lb 3.2 oz)   SpO2 94%   BMI 31.36 kg/m²      Physical Exam  Constitutional:       General: He is not in acute distress.     Appearance: Normal appearance. He is well-developed. He is not diaphoretic.   HENT:      Head: Normocephalic.      Right Ear: External ear normal.      Left Ear: External ear normal.      Nose: Nose normal.   Eyes:      General:         Right eye: No discharge.         Left eye: No discharge.      Conjunctiva/sclera: Conjunctivae normal.      Pupils: Pupils are equal, round, and reactive to light.   Neck:      Thyroid: No thyromegaly.      Trachea: No tracheal deviation.   Cardiovascular:      Rate and Rhythm: Normal rate and regular rhythm.      Heart sounds: Normal heart sounds. No murmur heard.     No friction rub.   Pulmonary:      Effort: Pulmonary effort is normal. No respiratory distress.      Breath sounds: Normal breath sounds. No wheezing.   Chest:      Chest wall: No tenderness.   Abdominal:      General: There is no distension.      Palpations: There is no mass.      Tenderness: There is no abdominal tenderness. There is no guarding or rebound.      Hernia: No hernia is present.   Musculoskeletal:         General: No swelling or deformity.      Cervical back: Normal range of motion.      Right lower leg: No edema.      Left lower leg: No edema.   Skin:     Findings: No erythema or rash.   Neurological:      General: No focal deficit present.      Mental Status: He is alert.      Cranial Nerves: No cranial nerve deficit.      Coordination: Coordination normal.      Comments: Significant right intention tremor.  Left looks great   Psychiatric:         Thought Content: Thought content normal.         "

## 2025-02-28 NOTE — ASSESSMENT & PLAN NOTE
Lab Results   Component Value Date    HGBA1C 6.3 (H) 02/25/2025     Monitor A1c in 6 months.  He is on no medications at this point and has been watching his diet.  Orders:    CBC and differential; Future    Comprehensive metabolic panel; Future    Hemoglobin A1C; Future

## 2025-02-28 NOTE — ASSESSMENT & PLAN NOTE
Orders:    CBC and differential; Future    Comprehensive metabolic panel; Future    Lipid Panel with Direct LDL reflex; Future    TSH, 3rd generation with Free T4 reflex; Future

## 2025-02-28 NOTE — ASSESSMENT & PLAN NOTE
Orders:    PARoxetine (PAXIL) 30 mg tablet; Take 1 tablet (30 mg total) by mouth daily    TSH, 3rd generation with Free T4 reflex; Future

## 2025-02-28 NOTE — ASSESSMENT & PLAN NOTE
Monitor labs prior to follow-up  Orders:    Comprehensive metabolic panel; Future    Lipid Panel with Direct LDL reflex; Future

## 2025-08-05 ENCOUNTER — TELEPHONE (OUTPATIENT)
Dept: NEUROLOGY | Facility: CLINIC | Age: 80
End: 2025-08-05